# Patient Record
Sex: FEMALE | Race: BLACK OR AFRICAN AMERICAN | Employment: PART TIME | URBAN - METROPOLITAN AREA
[De-identification: names, ages, dates, MRNs, and addresses within clinical notes are randomized per-mention and may not be internally consistent; named-entity substitution may affect disease eponyms.]

---

## 2020-02-25 ENCOUNTER — TELEPHONE (OUTPATIENT)
Dept: GASTROENTEROLOGY | Age: 65
End: 2020-02-25

## 2020-03-19 ENCOUNTER — TELEPHONE (OUTPATIENT)
Dept: GASTROENTEROLOGY | Age: 65
End: 2020-03-19

## 2020-03-19 NOTE — TELEPHONE ENCOUNTER
Patient called left message on voicemail that she needs to make an appointment  3/19/2020 @ 12:20 .  Returned call and scheduled appointment

## 2020-05-19 ENCOUNTER — TELEPHONE (OUTPATIENT)
Dept: GASTROENTEROLOGY | Age: 65
End: 2020-05-19

## 2020-05-22 ENCOUNTER — TELEPHONE (OUTPATIENT)
Dept: GASTROENTEROLOGY | Age: 65
End: 2020-05-22

## 2020-06-15 ENCOUNTER — APPOINTMENT (OUTPATIENT)
Dept: CT IMAGING | Age: 65
End: 2020-06-15
Payer: MEDICARE

## 2020-06-15 ENCOUNTER — HOSPITAL ENCOUNTER (EMERGENCY)
Age: 65
Discharge: HOME | End: 2020-06-15
Attending: EMERGENCY MEDICINE
Payer: MEDICARE

## 2020-06-15 VITALS
OXYGEN SATURATION: 96 % | SYSTOLIC BLOOD PRESSURE: 136 MMHG | HEART RATE: 94 BPM | RESPIRATION RATE: 20 BRPM | DIASTOLIC BLOOD PRESSURE: 73 MMHG | TEMPERATURE: 98.7 F

## 2020-06-15 PROCEDURE — 72131 CT LUMBAR SPINE W/O DYE: CPT

## 2020-06-15 PROCEDURE — 99283 EMERGENCY DEPT VISIT LOW MDM: CPT

## 2020-06-15 PROCEDURE — 6360000002 HC RX W HCPCS: Performed by: STUDENT IN AN ORGANIZED HEALTH CARE EDUCATION/TRAINING PROGRAM

## 2020-06-15 PROCEDURE — 6370000000 HC RX 637 (ALT 250 FOR IP): Performed by: STUDENT IN AN ORGANIZED HEALTH CARE EDUCATION/TRAINING PROGRAM

## 2020-06-15 PROCEDURE — APPSS30 APP SPLIT SHARED TIME 16-30 MINUTES: Performed by: PHYSICIAN ASSISTANT

## 2020-06-15 PROCEDURE — 96372 THER/PROPH/DIAG INJ SC/IM: CPT

## 2020-06-15 RX ORDER — ACETAMINOPHEN 325 MG/1
650 TABLET ORAL ONCE
Status: COMPLETED | OUTPATIENT
Start: 2020-06-15 | End: 2020-06-15

## 2020-06-15 RX ORDER — ORPHENADRINE CITRATE 30 MG/ML
60 INJECTION INTRAMUSCULAR; INTRAVENOUS ONCE
Status: COMPLETED | OUTPATIENT
Start: 2020-06-15 | End: 2020-06-15

## 2020-06-15 RX ORDER — CYCLOBENZAPRINE HCL 5 MG
5 TABLET ORAL 2 TIMES DAILY PRN
Qty: 10 TABLET | Refills: 0 | Status: SHIPPED | OUTPATIENT
Start: 2020-06-15 | End: 2020-06-25

## 2020-06-15 RX ORDER — ACETAMINOPHEN 325 MG/1
650 TABLET ORAL EVERY 6 HOURS PRN
Qty: 30 TABLET | Refills: 0 | Status: SHIPPED | OUTPATIENT
Start: 2020-06-15 | End: 2021-04-10 | Stop reason: SDUPTHER

## 2020-06-15 RX ORDER — KETOROLAC TROMETHAMINE 30 MG/ML
30 INJECTION, SOLUTION INTRAMUSCULAR; INTRAVENOUS ONCE
Status: COMPLETED | OUTPATIENT
Start: 2020-06-15 | End: 2020-06-15

## 2020-06-15 RX ADMIN — KETOROLAC TROMETHAMINE 30 MG: 30 INJECTION, SOLUTION INTRAMUSCULAR at 15:08

## 2020-06-15 RX ADMIN — ORPHENADRINE CITRATE 60 MG: 30 INJECTION INTRAMUSCULAR; INTRAVENOUS at 15:07

## 2020-06-15 RX ADMIN — ACETAMINOPHEN 650 MG: 325 TABLET ORAL at 15:08

## 2020-06-15 ASSESSMENT — PAIN DESCRIPTION - PAIN TYPE: TYPE: ACUTE PAIN

## 2020-06-15 ASSESSMENT — PAIN SCALES - GENERAL: PAINLEVEL_OUTOF10: 10

## 2020-06-15 ASSESSMENT — PAIN DESCRIPTION - LOCATION: LOCATION: BACK

## 2020-06-15 ASSESSMENT — PAIN DESCRIPTION - ORIENTATION: ORIENTATION: LOWER

## 2020-06-15 NOTE — CONSULTS
confused - 4 []       Syllables, expletives - 3 []       Grunts - 2 []       None - 1 []    MOTOR RESPONSE     Spontaneous, command - 6 [x]       Localizes pain - 5 []       Withdraws pain - 4 []       Abnormal flexion - 3 []       Abnormal extension - 2 []       None - 1 []            Total GCS: 15    Mental Status:  Alert and oriented               Cranial Nerves:    cranial nerves II-XII are grossly intact    Motor Exam:    Drift:  absent  Tone:  normal    Motor exam is symmetrical 5 out of 5 all extremities bilaterally with the exception of 4/5 adriane on plantarflexion    Sensory:    Right Upper Extremity:  normal  Left Upper Extremity:  normal  Right Lower Extremity:  normal  Left Lower Extremity:  normal         LABS AND IMAGING:     CBC with Differential:  No results found for: WBC, RBC, HGB, HCT, PLT, MCV, MCH, MCHC, RDW, NRBC, SEGSPCT, BANDSPCT, BLASTSPCT, METASPCT, LYMPHOPCT, PROMYELOPCT, MONOPCT, MYELOPCT, EOSPCT, BASOPCT, MONOSABS, LYMPHSABS, EOSABS, BASOSABS, DIFFTYPE  BMP:  No results found for: NA, K, CL, CO2, BUN, LABALBU, CREATININE, CALCIUM, GFRAA, LABGLOM, GLUCOSE    Radiology Review:    Ct Lumbar Spine Wo Contrast    Result Date: 6/15/2020  EXAMINATION: CT OF THE LUMBAR SPINE WITHOUT CONTRAST  6/15/2020 TECHNIQUE: CT of the lumbar spine was performed without the administration of intravenous contrast. Multiplanar reformatted images are provided for review. Dose modulation, iterative reconstruction, and/or weight based adjustment of the mA/kV was utilized to reduce the radiation dose to as low as reasonably achievable. COMPARISON: None HISTORY: ORDERING SYSTEM PROVIDED HISTORY: fall, lumbar pain TECHNOLOGIST PROVIDED HISTORY: fall, lumbar pain FINDINGS: BONES/ALIGNMENT: Sagittal alignment of the lumbar spine is normal.  Lumbar vertebral bodies are normal in height. No acute lumbar spine fracture DEGENERATIVE CHANGES: Mild multilevel degenerative disc disease throughout the lumbar spine. Mild-to-moderate L2-L3 spinal canal stenosis due to diffuse disc bulge and ligamentum flavum thickening. Moderate to severe L3-L4 and L4-L5 spinal canal stenosis due to diffuse disc bulge and ligamentum thickening. Severe left L4-L5 neural foraminal stenosis. SOFT TISSUES/RETROPERITONEUM: No paraspinal mass is seen. 1. No acute osseous abnormality in the lumbar spine. 2. Moderate to severe L3-L4 and L4-L5 spinal canal stenosis. 3. Severe left L4-L5 neural foraminal stenosis. ASSESSMENT AND PLAN:     There is no problem list on file for this patient. A/P:  This is a 59 y.o. female with lumbar stenosis L3-4 and L4-5    Patient discussed with attending, evaluated patient along with attending.      - No neurosurgical interventions planned for now  - Ok to gain pain control and follow up outpt  - Follow up with Dr. Torrie Miller in 1-2 weeks       Please contact neurosurgery with any changes in patients neurologic status. Thank you for your consult.        Bryant Veloz pager [phone]  6/15/2020  5:39 PM

## 2020-06-17 ASSESSMENT — ENCOUNTER SYMPTOMS
VOMITING: 0
NAUSEA: 0
SHORTNESS OF BREATH: 0
ABDOMINAL PAIN: 0
BACK PAIN: 1

## 2020-06-17 NOTE — ED PROVIDER NOTES
motion and neck supple. Cardiovascular:      Rate and Rhythm: Normal rate. Pulses: Normal pulses. Pulmonary:      Effort: Pulmonary effort is normal. No respiratory distress (speaking in full sentences). Abdominal:      General: Bowel sounds are normal.      Palpations: Abdomen is soft. Musculoskeletal: Normal range of motion. General: No deformity (on exposed surfaces). Comments: Lumbar TTP at midline, L3-L4 region. Decreased ROM d/t pain. Abnormal gait. Skin:     General: Skin is warm and dry. Findings: No erythema or rash. Neurological:      Mental Status: She is alert and oriented to person, place, and time. Deep Tendon Reflexes: Reflexes are normal and symmetric. Comments: Normal sensation in all extremities. Psychiatric:         Behavior: Behavior normal.         DIFFERENTIAL  DIAGNOSIS     PLAN (LABS / IMAGING / EKG):  Orders Placed This Encounter   Procedures    CT LUMBAR SPINE WO CONTRAST    Inpatient consult to Neurosurgery       MEDICATIONS ORDERED:  Orders Placed This Encounter   Medications    orphenadrine (NORFLEX) injection 60 mg    ketorolac (TORADOL) injection 30 mg    acetaminophen (TYLENOL) tablet 650 mg    cyclobenzaprine (FLEXERIL) 5 MG tablet     Sig: Take 1 tablet by mouth 2 times daily as needed for Muscle spasms     Dispense:  10 tablet     Refill:  0    acetaminophen (TYLENOL) 325 MG tablet     Sig: Take 2 tablets by mouth every 6 hours as needed for Pain     Dispense:  30 tablet     Refill:  0       IMPRESSION:     ED Course as of Jun 17 1254   Mon Paul 15, 2020   [address] [AD]   8889    [AD]      ED Course User Index  [AD] Orquidea Zuniga MD       DIAGNOSTIC RESULTS / EMERGENCY DEPARTMENT COURSE / MDM     LABS:  No results found for this visit on 06/15/20. RADIOLOGY:  CT LUMBAR SPINE WO CONTRAST   Final Result   1. No acute osseous abnormality in the lumbar spine.    2. Moderate to severe L3-L4 and L4-L5 spinal canal stenosis. 3. Severe left L4-L5 neural foraminal stenosis. EKG  None    All EKG's are interpreted by the Emergency Department Physician who either signs or Co-signs this chart in the absence of a cardiologist.    EMERGENCY DEPARTMENT COURSE:  Pt cleared for discharged. Arranged f/u with Neurosurgery outpt with Dr. Dickson Dumont. Pain improved in ED. Provided w flexeril & Tylenol. No urinary incontinence, used restroom while in ED. No loss of sensation. Improved ambulation after meds. PROCEDURES:  None    CONSULTS:  IP CONSULT TO NEUROSURGERY    CRITICAL CARE:  None    FINAL IMPRESSION      1.  Spinal stenosis of lumbar region, unspecified whether neurogenic claudication present          DISPOSITION / PLAN     DISPOSITION Decision To Discharge 06/15/2020 05:43:23 PM      PATIENT REFERRED TO:  Grayson Pedro MD  [address]  [phone]    Schedule an appointment as soon as possible for a visit in 1 day  [place] [phone]    Schedule an appointment as soon as possible for a visit in 1 day  For Follow-up      DISCHARGE MEDICATIONS:  Discharge Medication List as of 6/15/2020  5:43 PM      START taking these medications    Details   cyclobenzaprine (FLEXERIL) 5 MG tablet Take 1 tablet by mouth 2 times daily as needed for Muscle spasms, Disp-10 tablet, R-0Print      acetaminophen (TYLENOL) 325 MG tablet Take 2 tablets by mouth every 6 hours as needed for Pain, Disp-30 tablet, R-0Print             Katia Miranda MD  Emergency Medicine Resident    (Please note that portions of thisnote were completed with a voice recognition program.  Efforts were made to edit the dictations but occasionally words are mis-transcribed.)       Katia Miranda MD  06/17/20 9798

## 2020-06-25 ENCOUNTER — HOSPITAL ENCOUNTER (EMERGENCY)
Age: 65
Discharge: HOME | End: 2020-06-25
Attending: EMERGENCY MEDICINE
Payer: COMMERCIAL

## 2020-06-25 VITALS
OXYGEN SATURATION: 98 % | HEART RATE: 66 BPM | TEMPERATURE: 98 F | SYSTOLIC BLOOD PRESSURE: 125 MMHG | DIASTOLIC BLOOD PRESSURE: 81 MMHG | RESPIRATION RATE: 16 BRPM

## 2020-06-25 PROCEDURE — 6360000002 HC RX W HCPCS: Performed by: STUDENT IN AN ORGANIZED HEALTH CARE EDUCATION/TRAINING PROGRAM

## 2020-06-25 PROCEDURE — 99283 EMERGENCY DEPT VISIT LOW MDM: CPT

## 2020-06-25 PROCEDURE — 96372 THER/PROPH/DIAG INJ SC/IM: CPT

## 2020-06-25 RX ORDER — DEXAMETHASONE SODIUM PHOSPHATE 4 MG/ML
4 INJECTION, SOLUTION INTRA-ARTICULAR; INTRALESIONAL; INTRAMUSCULAR; INTRAVENOUS; SOFT TISSUE ONCE
Status: COMPLETED | OUTPATIENT
Start: 2020-06-25 | End: 2020-06-25

## 2020-06-25 RX ORDER — METHYLPREDNISOLONE 4 MG/1
TABLET ORAL
Qty: 1 KIT | Refills: 0 | Status: SHIPPED | OUTPATIENT
Start: 2020-06-26 | End: 2020-07-02

## 2020-06-25 RX ORDER — KETOROLAC TROMETHAMINE 30 MG/ML
30 INJECTION, SOLUTION INTRAMUSCULAR; INTRAVENOUS ONCE
Status: COMPLETED | OUTPATIENT
Start: 2020-06-25 | End: 2020-06-25

## 2020-06-25 RX ADMIN — KETOROLAC TROMETHAMINE 30 MG: 30 INJECTION, SOLUTION INTRAMUSCULAR at 10:09

## 2020-06-25 RX ADMIN — DEXAMETHASONE SODIUM PHOSPHATE 4 MG: 4 INJECTION, SOLUTION INTRA-ARTICULAR; INTRALESIONAL; INTRAMUSCULAR; INTRAVENOUS; SOFT TISSUE at 10:09

## 2020-06-25 ASSESSMENT — ENCOUNTER SYMPTOMS
ABDOMINAL PAIN: 0
FACIAL SWELLING: 0
BACK PAIN: 1
SHORTNESS OF BREATH: 0
COUGH: 0

## 2020-06-25 ASSESSMENT — PAIN SCALES - GENERAL
PAINLEVEL_OUTOF10: 10
PAINLEVEL_OUTOF10: 10

## 2020-06-25 ASSESSMENT — PAIN DESCRIPTION - ORIENTATION: ORIENTATION: LOWER;MID

## 2020-06-25 ASSESSMENT — PAIN DESCRIPTION - PAIN TYPE: TYPE: ACUTE PAIN

## 2020-06-25 ASSESSMENT — PAIN DESCRIPTION - LOCATION: LOCATION: BACK

## 2020-06-25 NOTE — ED PROVIDER NOTES
[facility]     Emergency Department     Faculty Attestation        I performed a history and physical examination of the patient and discussed management with the resident. I reviewed the residents note and agree with the documented findings and plan of care. Any areas of disagreement are noted on the chart. I was personally present for the key portions of any procedures. I have documented in the chart those procedures where I was not present during the key portions. I have reviewed the emergency nurses triage note. I agree with the chief complaint, past medical history, past surgical history, allergies, medications, social and family history as documented unless otherwise noted below. For mid-level providers such as nurse practitioners as well as physicians assistants:    I have personally seen and evaluated the patient. I find the patient's history and physical exam are consistent with NP/PA documentation. I agree with the care provided, treatment rendered, disposition, & follow-up plan. Additional findings are as noted. Vital Signs: /81   Pulse 66   Temp 98 °F (36.7 °C) (Oral)   Resp 16   SpO2 98%   PCP:  Marvin Fisher MD    Pertinent Comments:     Presents with chronic back pain she seen her recently after a fall and had CT which showed foraminal stenosis. She has an MRI and appointment with nurse surgery next couple weeks. She denies any bowel or bladder incontinence. There is no weakness in her extremities she is able walk and ambulate. On exam she has midline lumbar tenderness but there is no step-offs or deformities she is able walk and ambulate with no assistance. There is no focal deficits she denies any fevers or chills. Abdomen is soft and nontender with no pulsatile abdominal mass.   My clinical suspicion for life-threatening internal spinal process such as tumor, mass, epidural is very unlikely given her normal neurological exam and the chronic nature of these findings. I feel she can be discharged and follow-up with neurosurgery as an outpatient. Will provide symptomatic treatment here.       Critical Care  None          Nash Murillo MD  Attending Emergency Medicine Physician              Miranda Blackwood MD  06/25/20 0520

## 2020-06-25 NOTE — PROGRESS NOTES
I was assigned to this patient in error. I did not see this patient or participate in their care.     Rahat Chan DO  6/25/2020 9:46 AM

## 2020-06-25 NOTE — ED PROVIDER NOTES
Not on file     Minutes per session: Not on file    Stress: Not on file   Relationships    Social connections     Talks on phone: Not on file     Gets together: Not on file     Attends Jain service: Not on file     Active member of club or organization: Not on file     Attends meetings of clubs or organizations: Not on file     Relationship status: Not on file    Intimate partner violence     Fear of current or ex partner: Not on file     Emotionally abused: Not on file     Physically abused: Not on file     Forced sexual activity: Not on file   Other Topics Concern    Not on file   Social History Narrative    Not on file       History reviewed. No pertinent family history. Allergies:  Patient has no known allergies. Home Medications:  Prior to Admission medications    Medication Sig Start Date End Date Taking? Authorizing Provider   methylPREDNISolone (MEDROL, JOSE ANTONIO,) 4 MG tablet Take by mouth. 6/26/20 7/2/20 Yes Lopez Bermudez DO   cyclobenzaprine (FLEXERIL) 5 MG tablet Take 1 tablet by mouth 2 times daily as needed for Muscle spasms 6/15/20 6/25/20  Tejas Kohler MD   acetaminophen (TYLENOL) 325 MG tablet Take 2 tablets by mouth every 6 hours as needed for Pain 6/15/20   Tejas Kohler MD       REVIEW OF SYSTEMS    (2-9 systems for level 4, 10 or more for level 5)      Review of Systems   Constitutional: Negative for fatigue and fever. HENT: Negative for facial swelling. Eyes: Negative for visual disturbance. Respiratory: Negative for cough and shortness of breath. Cardiovascular: Negative for chest pain and leg swelling. Gastrointestinal: Negative for abdominal pain. Genitourinary: Negative for dysuria. Musculoskeletal: Positive for back pain. Negative for neck pain and neck stiffness. Skin: Negative for rash. Neurological: Negative for weakness and headaches. Psychiatric/Behavioral: Negative for confusion.        PHYSICAL EXAM   (up to 7 for level 4, 8 or more for level 5)      INITIAL VITALS:   /81   Pulse 66   Temp 98 °F (36.7 °C) (Oral)   Resp 16   SpO2 98%     Physical Exam  Constitutional:       Appearance: She is not ill-appearing or diaphoretic. HENT:      Head: Normocephalic and atraumatic. Nose: No rhinorrhea. Eyes:      General:         Right eye: No discharge. Left eye: No discharge. Neck:      Musculoskeletal: No neck rigidity. Cardiovascular:      Rate and Rhythm: Normal rate. Pulmonary:      Effort: Pulmonary effort is normal. No respiratory distress. Abdominal:      General: There is no distension. Tenderness: There is no abdominal tenderness. There is no right CVA tenderness, left CVA tenderness or guarding. Musculoskeletal:         General: No swelling or deformity. Lumbar back: She exhibits tenderness, pain and spasm. She exhibits no edema and no deformity. Right lower leg: No edema. Left lower leg: No edema. Skin:     Capillary Refill: Capillary refill takes less than 2 seconds. Coloration: Skin is not jaundiced. Findings: No bruising, lesion or rash. Neurological:      Mental Status: She is alert. Cranial Nerves: No cranial nerve deficit. Sensory: No sensory deficit. Motor: No weakness. Coordination: Coordination normal.      Gait: Gait normal.         DIFFERENTIAL  DIAGNOSIS     PLAN (LABS / IMAGING / EKG):  No orders of the defined types were placed in this encounter. MEDICATIONS ORDERED:  Orders Placed This Encounter   Medications    ketorolac (TORADOL) injection 30 mg    dexamethasone (DECADRON) injection 4 mg    methylPREDNISolone (MEDROL, JOSE ANTONIO,) 4 MG tablet     Sig: Take by mouth.      Dispense:  1 kit     Refill:  0       DDX: Muscle spasm, back strain, acute on chronic back pain secondary to foraminal stenosis versus spinal stenosis, herniated disc, sciatica    DIAGNOSTIC RESULTS / EMERGENCY DEPARTMENT COURSE / MDM   LAB RESULTS:  No results found for this visit on 06/25/20. IMPRESSION: 79-year-old female with apparent back pain, vital signs stable and afebrile. Patient nontoxic-appearing. Complaints of lower back pain secondary to prior injury for which she was evaluated 2 weeks ago. Patient reports that she has follow-up scheduled with neurosurgery. Denies any new trauma or injury. Likely acute on chronic back pain secondary to foraminal stenosis. No evidence of cauda equina syndrome. EMERGENCY DEPARTMENT COURSE:  Patient evaluated at bedside, no acute distress, vital signs stable and afebrile. Pain with palpation of lumbar back. Given previous prescriptions for Flexeril and Tylenol at prior visit. Patient states that she has follow-up scheduled with neurosurgery on 7/5/2020 but the pain is getting worse and she has been unable to sleep at night. Plan for symptom control with IM Toradol and Decadron. Plan for discharge home with short course of steroids. Will instruct patient to keep her follow-up appointment with neurosurgery. Given strict ED return cautions follow directions. She verbalized understanding of and agreement with the discharge plan. PROCEDURES:  None    CONSULTS:  None    CRITICAL CARE:  Please see attending note    FINAL IMPRESSION      1. Acute exacerbation of chronic low back pain          DISPOSITION / PLAN     DISPOSITION Discharge - Pending Orders Complete 06/25/2020 10:08:57 AM      PATIENT REFERRED TO:  Melanie Zuniga MD  [address] [place]  [phone]    Schedule an appointment as soon as possible for a visit   For re-evaluation      DISCHARGE MEDICATIONS:  New Prescriptions    METHYLPREDNISOLONE (MEDROL, JOSE ATNONIO,) 4 MG TABLET    Take by mouth.        Samuel Mccallum DO  Emergency Medicine Resident    (Please note that portions of thisnote were completed with a voice recognition program.  Efforts were made to edit the dictations but occasionally words are mis-transcribed.)        Enzo Arias

## 2020-06-25 NOTE — ED NOTES
Pt arrived to ED alert and oriented x4. Pt c/o mid, lower back pain s/p fall 1 week ago. Pt reports being seen after fall and has MRI scheduled for July 5th. Pt reports taking her pain meds with no relief and reports that they cause her nausea. Pt denies new injury or fall. Pt denies having been around anyone suspected to have COVID-19 or anyone that has been sick, denies recent travel outside the [facility] or [address]. RR even and unlabored. NAD noted. Will continue monitor.      Keli Calle, RN  06/25/20 1011

## 2020-07-05 ENCOUNTER — HOSPITAL ENCOUNTER (OUTPATIENT)
Dept: MRI IMAGING | Age: 65
Discharge: HOME | End: 2020-07-07
Payer: MEDICARE

## 2020-07-05 PROCEDURE — 72148 MRI LUMBAR SPINE W/O DYE: CPT

## 2020-07-15 ENCOUNTER — HOSPITAL ENCOUNTER (EMERGENCY)
Age: 65
Discharge: HOME | End: 2020-07-15
Attending: EMERGENCY MEDICINE
Payer: COMMERCIAL

## 2020-07-15 VITALS
OXYGEN SATURATION: 97 % | HEART RATE: 76 BPM | WEIGHT: 143 LBS | BODY MASS INDEX: 22.98 KG/M2 | HEIGHT: 66 IN | RESPIRATION RATE: 20 BRPM | DIASTOLIC BLOOD PRESSURE: 68 MMHG | SYSTOLIC BLOOD PRESSURE: 105 MMHG | TEMPERATURE: 98.3 F

## 2020-07-15 PROCEDURE — 6360000002 HC RX W HCPCS: Performed by: STUDENT IN AN ORGANIZED HEALTH CARE EDUCATION/TRAINING PROGRAM

## 2020-07-15 PROCEDURE — 96372 THER/PROPH/DIAG INJ SC/IM: CPT

## 2020-07-15 PROCEDURE — 99283 EMERGENCY DEPT VISIT LOW MDM: CPT

## 2020-07-15 RX ORDER — KETOROLAC TROMETHAMINE 30 MG/ML
30 INJECTION, SOLUTION INTRAMUSCULAR; INTRAVENOUS ONCE
Status: COMPLETED | OUTPATIENT
Start: 2020-07-15 | End: 2020-07-15

## 2020-07-15 RX ORDER — DEXAMETHASONE SODIUM PHOSPHATE 4 MG/ML
4 INJECTION, SOLUTION INTRA-ARTICULAR; INTRALESIONAL; INTRAMUSCULAR; INTRAVENOUS; SOFT TISSUE ONCE
Status: COMPLETED | OUTPATIENT
Start: 2020-07-15 | End: 2020-07-15

## 2020-07-15 RX ADMIN — KETOROLAC TROMETHAMINE 30 MG: 30 INJECTION, SOLUTION INTRAMUSCULAR at 15:43

## 2020-07-15 RX ADMIN — DEXAMETHASONE SODIUM PHOSPHATE 4 MG: 4 INJECTION, SOLUTION INTRAMUSCULAR; INTRAVENOUS at 15:43

## 2020-07-15 ASSESSMENT — PAIN SCALES - GENERAL: PAINLEVEL_OUTOF10: 10

## 2020-07-15 NOTE — ED PROVIDER NOTES
[facility]     Emergency Department     Faculty Note/ Attestation      Pt Name: Donnie Ochoa                                       MRN: [id number]  [place] 1955  Date of evaluation: 7/15/2020    Patients PCP:    Buck Dubin, MD      Attestation  I performed a history and physical examination of the patient and discussed management with the resident. I reviewed the residents note and agree with the documented findings and plan of care. Any areas of disagreement are noted on the chart. I was personally present for the key portions of any procedures. I have documented in the chart those procedures where I was not present during the key portions. I have reviewed the emergency nurses triage note. I agree with the chief complaint, past medical history, past surgical history, allergies, medications, social and family history as documented unless otherwise noted below. For Physician Assistant/ Nurse Practitioner cases/documentation I have personally evaluated this patient and have completed at least one if not all key elements of the E/M (history, physical exam, and MDM). Additional findings are as noted. Initial Screens:             Vitals:    Vitals:    07/15/20 1426 07/15/20 1455   BP:  105/68   Pulse:  76   Resp:  20   Temp: 98.3 °F (36.8 °C)    TempSrc: Oral    SpO2:  97%   Weight:  143 lb (64.9 kg)   Height:  5' 6\" (1.676 m)       CHIEF COMPLAINT       Chief Complaint   Patient presents with    Back Pain     Pt to the ED c/o persistent back pain x 1 month since a fall. Pt states that she has been seen for back pain s/p fall and has had CT and MRI. Pt reports that she was seen by neurologist yesterday when she was prescribed pain medication and referred for PT and pain management.   Pt reports that she has not yet had time to fill pain medications              DIAGNOSTIC RESULTS             RADIOLOGY:   No orders to display         LABS:  Labs Reviewed - No data to display      EMERGENCY DEPARTMENT COURSE:     -------------------------  BP: 105/68, Temp: 98.3 °F (36.8 °C), Pulse: 76, Resp: 20      Comments    Acute on chronic LBP  Recent fall 1 ago  MRI with spondylolisthesis    Requesting Toradol and decadron  Has narcotic Rx at the pharmacy    Patient is unstable on her feet, she moved from [place] and has a walker and many assistive aids there however does not have them here. She does have a neurosurgeon here that she saw yesterday and is plugged in for water therapy, PT, pain management however has not been able to get her prescriptions filled or start these therapies. We will perform a post void residual and get her walker prior to discharge.   If these are reassuring we will discharge her with her current plan, otherwise we will put her in [facility] for PT OT    (Please note that portions of this note were completed with a voice recognition program.  Efforts were made to edit the dictations but occasionally words are mis-transcribed.)      [place] MD  Attending Emergency Physician         Klaudia Morin MD  07/15/20 1640

## 2020-07-15 NOTE — ED NOTES
Tingling bilateral sides of legs and pain shooting from lumbar to right leg. Pt states she fell approx 1 month ago. Saw neurologist yesterday who ordered pain medications but states she did not get the message that it was called in and so she did not pick it up. Pt states she has been set up for physical therapy and pain management through the neurologist. Pt is alert and oriented. Brought to room via wheelchair, ambulated to bed slowly and bent over. Will continue to monitor.         Tiffany Rosado RN  07/15/20 2334

## 2020-07-15 NOTE — ED NOTES
Took pt to bathroom and performed post residual scan on pt bladder. 37ml remained. Pt provided a walker and ambulated.         Quynh Philip RN  07/15/20 3016

## 2020-07-15 NOTE — ED PROVIDER NOTES
[address]  Emergency Department Encounter  Emergency Medicine Resident       This patient was evaluated in the Emergency Department for symptoms described in the history of present illness. He/she was evaluated in the context of the global COVID-19 pandemic, which necessitated consideration that the patient might be at risk for infection with the SARS-CoV-2 virus that causes COVID-19. Institutional protocols and algorithms that pertain to the evaluation of patients at risk for COVID-19 are in a state of rapid change based on information released by regulatory bodies including the CDC and federal and state organizations. These policies and algorithms were followed during the patient's care in the ED. Pt Name: Zahira March  MRN: [id number]  [place] 1955  Date of evaluation: 7/15/20  PCP:  Ascencion Glaser MD    [address] [place] [address]       Chief Complaint   Patient presents with    Back Pain     Pt to the ED c/o persistent back pain x 1 month since a fall. Pt states that she has been seen for back pain s/p fall and has had CT and MRI. Pt reports that she was seen by neurologist yesterday when she was prescribed pain medication and referred for PT and pain management. Pt reports that she has not yet had time to fill pain medications        HISTORY OF PRESENT ILLNESS  (Location/Symptom, Timing/Onset, Context/Setting, Quality, Duration, Modifying Factors, Severity.)    Zahira March is a 59 y.o. female  who presents with back pain that is chronic. Patient states that she was evaluated by neurosurgery, Dr. Becky Rosenthal on 7/5 and had an MRI done that showed mild lumbar spondylosis. Patient states that she has a chronic back injury from several months prior from a fall, her pain is 10 on 10 severity and unchanged. She states that she was unable to get to the pharmacy to  her pain pills today. She is requesting something to help her so that she may get to her medications.   She does not want pain pills. No recent spinal anesthesia, spinal surgery, or IVDA. No trauma, history of cancer, AC use, prolonged steroid use, hx of osteoporosis, hx of AAA, unrelenting night or rest pain, unexplained weight loss, immunocompromised status, hx of bicuspid aortic valve, connective tissue disorder, phillips syndrome, recent cardiac surgery/cath    PAST MEDICAL / SURGICAL / SOCIAL / FAMILY HISTORY    has a past medical history of Emphysema (subcutaneous) (surgical) resulting from a procedure and GERD (gastroesophageal reflux disease). has no past surgical history on file.     Social History     Socioeconomic History    Marital status: Single     Spouse name: Not on file    Number of children: Not on file    Years of education: Not on file    Highest education level: Not on file   Occupational History    Not on file   Social Needs    Financial resource strain: Not on file    Food insecurity     Worry: Not on file     Inability: Not on file    Transportation needs     Medical: Not on file     Non-medical: Not on file   Tobacco Use    Smoking status: Not on file   Substance and Sexual Activity    Alcohol use: Not on file    Drug use: Not on file    Sexual activity: Not on file   Lifestyle    Physical activity     Days per week: Not on file     Minutes per session: Not on file    Stress: Not on file   Relationships    Social connections     Talks on phone: Not on file     Gets together: Not on file     Attends Sabianism service: Not on file     Active member of club or organization: Not on file     Attends meetings of clubs or organizations: Not on file     Relationship status: Not on file    Intimate partner violence     Fear of current or ex partner: Not on file     Emotionally abused: Not on file     Physically abused: Not on file     Forced sexual activity: Not on file   Other Topics Concern    Not on file   Social History Narrative    Not on file       No family history on file.    Allergies:    Decadron [dexamethasone]; Iv dye [iodides]; and Penicillins    Home Medications:  Prior to Admission medications    Medication Sig Start Date End Date Taking? Authorizing Provider   acetaminophen (TYLENOL) 325 MG tablet Take 2 tablets by mouth every 6 hours as needed for Pain 6/15/20   Patricia Cortez MD       REVIEW OF SYSTEMS    (2-9 systems for level 4, 10 or more for level 5)        Gen: No Fever, No chills  EYES: No blurry visiion, no double vision  HENT: No sore throat, No runny nose. No cough  CV: No CP , No palpitation  RESP: No SOB, No respiratory distress  GI: No N/V, No Abdm pain  : No dysuria  SKIN: No rash  MSK: + back pain, no joint pain  NEURO: No HA, no weakness  PSYCH: No SI/HI        PHYSICAL EXAM   (up to 7 for level 4, 8 or more for level 5)     INITIAL VITALS:  height is 5' 6\" (1.676 m) and weight is 143 lb (64.9 kg). Her oral temperature is 98.3 °F (36.8 °C). Her blood pressure is 105/68 and her pulse is 76. Her respiration is 20 and oxygen saturation is 97%.      Physical Exam  GENERAL: upon initial examination, patient is uncomfortable appearing, nontoxic, and not in acute respiratory distress  HENT: normocephalic , nose normal,   EYES: no occular discharge, no scleral icterus  NECK: no JVD, no tracheal deviation  CV: Normal S1 S2, no MRG  PULM / CHEST: CTA Bilaterally all fields, no WRR  ABDOMEN: SNTND, No peritoneal signs   / ANORECTAL: deferred  MSK: no gross deformity, no edema, no TTP  NEURO:   Cranial Nerves:              CNII: Visual acuity normal, Visual fields full to confrontation              CNIII, IV, VI: Pupils equal, round and reactive to light, full extraoccular movements without nystagmus              CN V: Facial sensation intact bilaterally to fine touch and pinprick, masseter 5/5              CN VII: Facial muscles symmetric and strong              CN VIII: Hears finger rub well bilaterally              CN IX: Deferred              CN X: Palate elevates symmetrically              CN XI: Full strength shoulder shrug bilaterally              CN XII: Tongue protrusion full and midline  Sensation: Sensation is intact to proprioception, two point discrimination, and light touch  Cerebellar: Heel to shin intact bilaterally  Gait: Patient's gait is normal not ataxic wide-based, no shuffling  Muscle Strength:  + 5 / 5 Strength to LUE flexion, Extension  + 5 / 5 Strength to RUE flexion , Extension  + 5 / 5 Strength to flexion & extension of Left Hip leg plantar and dorsi flexion  + 5 / 5 Strength to flexion & extension of Right Hip leg plantar and dorsi flexion  No weakness upon cervical flexion to indicate critical spinal stenosis  No overlything skin changes on back but there is focal tenderness in the lumbar paraspinal area. SKIN: no rash, no erythema, cap refill < 2 sec  PSYCH / BEHAVIORAL: mood/affect normal, behavior normal, no flight of ideas      DIFFERENTIAL  DIAGNOSIS   PLAN (LABS / IMAGING / EKG):  No orders of the defined types were placed in this encounter. MEDICATIONS ORDERED:  Orders Placed This Encounter   Medications    ketorolac (TORADOL) injection 30 mg    dexamethasone (DECADRON) injection 4 mg           DIAGNOSTIC RESULTS / EMERGENCYDEPARTMENT COURSE / MDM   LABS:  Labs Reviewed - No data to display    RADIOLOGY:  No results found. EMERGENCY DEPARTMENT COURSE / MDM / DDX:      MDM:  42-year-old female presenting with acute on chronic exacerbation of low back pain. Denies any red flag symptoms. Recently had MRI on 7/5 demonstrating mild lumbar spondylosis. Will provide Toradol and Decadron, patient does not request any narcotics or other medications she is just requesting with medication she had last time to help bridge her so that she may  her prescription from the pharmacy. Discharge    CONSULTS:  None    CRITICAL CARE:  Please see attending note    FINAL IMPRESSION     1.  Acute exacerbation of chronic low back pain DISPOSITION / PLAN   DISPOSITION Decision To Discharge 07/15/2020 03:35:34 PM       Evaluation and treatment course in the ED, and plan of care upon discharge was discussed in length with the patient. Patient had no further questions prior to being discharged and was instructed to return to the ED for new or worsening symptoms. Any changes to existing medications or new prescriptions were reviewed with patient and they expressed understanding of how to correctly take their medications and the possible side effects. The patient understands that at this time there is no evidence for a more malignant underlying process, but the patient also understands that early in the process of an illness or injury, an emergency department workup can be falsely reassuring. Routine discharge counseling was given, and the patient understands that worsening, changing or persistent symptoms should prompt an immediate call or follow up with his/her primary physician or return to the emergency department. The importance of appropriate follow up was also discussed. More extensive discharge instructions were given in the patients discharge paperwork. PATIENT REFERRED TO:  Brian Hernandez MD  [address] [facility] [address]  [phone]    Schedule an appointment as soon as possible for a visit in 2 days  Return to the ER if worsening or any other concern      DISCHARGE MEDICATIONS:  New Prescriptions    No medications on file       Dr. Reilly Neal.  [address] [facility]  Emergency Medicine Resident Physician, PGY-3    (Please note that portions of this note were completed with a voice recognition program.  Efforts were made to edit the dictations but occasionally words are mis-transcribed.)         Amanda Cavazos DO  Resident  07/15/20 5511

## 2020-09-08 ENCOUNTER — HOSPITAL ENCOUNTER (OUTPATIENT)
Age: 65
Setting detail: OBSERVATION
Discharge: HOME | End: 2020-09-10
Attending: EMERGENCY MEDICINE | Admitting: EMERGENCY MEDICINE
Payer: MEDICARE

## 2020-09-08 ENCOUNTER — APPOINTMENT (OUTPATIENT)
Dept: GENERAL RADIOLOGY | Age: 65
End: 2020-09-08
Payer: MEDICARE

## 2020-09-08 PROBLEM — R55 SYNCOPE AND COLLAPSE: Status: ACTIVE | Noted: 2020-09-08

## 2020-09-08 LAB
-: NORMAL
ABSOLUTE EOS #: 0.16 K/UL (ref 0–0.44)
ABSOLUTE IMMATURE GRANULOCYTE: 0.03 K/UL (ref 0–0.3)
ABSOLUTE LYMPH #: 2.72 K/UL (ref 1.1–3.7)
ABSOLUTE MONO #: 0.56 K/UL (ref 0.1–1.2)
ALBUMIN SERPL-MCNC: 4.1 G/DL (ref 3.5–5.2)
ALBUMIN/GLOBULIN RATIO: 1.5 (ref 1–2.5)
ALP BLD-CCNC: 51 U/L (ref 35–104)
ALT SERPL-CCNC: 22 U/L (ref 5–33)
AMORPHOUS: NORMAL
ANION GAP SERPL CALCULATED.3IONS-SCNC: 11 MMOL/L (ref 9–17)
AST SERPL-CCNC: 38 U/L
BACTERIA: NORMAL
BASOPHILS # BLD: 1 % (ref 0–2)
BASOPHILS ABSOLUTE: 0.05 K/UL (ref 0–0.2)
BILIRUB SERPL-MCNC: 0.38 MG/DL (ref 0.3–1.2)
BILIRUBIN URINE: NEGATIVE
BUN BLDV-MCNC: 19 MG/DL (ref 8–23)
BUN/CREAT BLD: ABNORMAL (ref 9–20)
CALCIUM SERPL-MCNC: 9.3 MG/DL (ref 8.6–10.4)
CASTS UA: NORMAL /LPF (ref 0–8)
CHLORIDE BLD-SCNC: 105 MMOL/L (ref 98–107)
CO2: 25 MMOL/L (ref 20–31)
COLOR: YELLOW
COMMENT UA: ABNORMAL
CREAT SERPL-MCNC: 0.81 MG/DL (ref 0.5–0.9)
CRYSTALS, UA: NORMAL /HPF
DIFFERENTIAL TYPE: ABNORMAL
EKG ATRIAL RATE: 78 BPM
EKG P AXIS: 70 DEGREES
EKG P-R INTERVAL: 158 MS
EKG Q-T INTERVAL: 388 MS
EKG QRS DURATION: 88 MS
EKG QTC CALCULATION (BAZETT): 442 MS
EKG R AXIS: 32 DEGREES
EKG T AXIS: 46 DEGREES
EKG VENTRICULAR RATE: 78 BPM
EOSINOPHILS RELATIVE PERCENT: 3 % (ref 1–4)
EPITHELIAL CELLS UA: NORMAL /HPF (ref 0–5)
GFR AFRICAN AMERICAN: >60 ML/MIN
GFR NON-AFRICAN AMERICAN: >60 ML/MIN
GFR SERPL CREATININE-BSD FRML MDRD: ABNORMAL ML/MIN/{1.73_M2}
GFR SERPL CREATININE-BSD FRML MDRD: ABNORMAL ML/MIN/{1.73_M2}
GLUCOSE BLD-MCNC: 115 MG/DL (ref 70–99)
GLUCOSE URINE: NEGATIVE
HCT VFR BLD CALC: 41 % (ref 36.3–47.1)
HEMOGLOBIN: 13.1 G/DL (ref 11.9–15.1)
IMMATURE GRANULOCYTES: 1 %
KETONES, URINE: NEGATIVE
LEUKOCYTE ESTERASE, URINE: ABNORMAL
LIPASE: 24 U/L (ref 13–60)
LYMPHOCYTES # BLD: 48 % (ref 24–43)
MCH RBC QN AUTO: 29.1 PG (ref 25.2–33.5)
MCHC RBC AUTO-ENTMCNC: 32 G/DL (ref 28.4–34.8)
MCV RBC AUTO: 91.1 FL (ref 82.6–102.9)
MONOCYTES # BLD: 10 % (ref 3–12)
MUCUS: NORMAL
NITRITE, URINE: NEGATIVE
NRBC AUTOMATED: 0 PER 100 WBC
OTHER OBSERVATIONS UA: NORMAL
PDW BLD-RTO: 13.2 % (ref 11.8–14.4)
PH UA: 7 (ref 5–8)
PLATELET # BLD: 230 K/UL (ref 138–453)
PLATELET ESTIMATE: ABNORMAL
PMV BLD AUTO: 10.2 FL (ref 8.1–13.5)
POTASSIUM SERPL-SCNC: 4.4 MMOL/L (ref 3.7–5.3)
PROTEIN UA: NEGATIVE
RBC # BLD: 4.5 M/UL (ref 3.95–5.11)
RBC # BLD: ABNORMAL 10*6/UL
RBC UA: NORMAL /HPF (ref 0–4)
RENAL EPITHELIAL, UA: NORMAL /HPF
SEG NEUTROPHILS: 37 % (ref 36–65)
SEGMENTED NEUTROPHILS ABSOLUTE COUNT: 2.08 K/UL (ref 1.5–8.1)
SODIUM BLD-SCNC: 141 MMOL/L (ref 135–144)
SPECIFIC GRAVITY UA: 1.02 (ref 1–1.03)
TOTAL PROTEIN: 6.9 G/DL (ref 6.4–8.3)
TRICHOMONAS: NORMAL
TROPONIN INTERP: NORMAL
TROPONIN INTERP: NORMAL
TROPONIN T: NORMAL NG/ML
TROPONIN T: NORMAL NG/ML
TROPONIN, HIGH SENSITIVITY: 6 NG/L (ref 0–14)
TROPONIN, HIGH SENSITIVITY: 6 NG/L (ref 0–14)
TURBIDITY: CLEAR
URINE HGB: NEGATIVE
UROBILINOGEN, URINE: NORMAL
WBC # BLD: 5.6 K/UL (ref 3.5–11.3)
WBC # BLD: ABNORMAL 10*3/UL
WBC UA: NORMAL /HPF (ref 0–5)
YEAST: NORMAL

## 2020-09-08 PROCEDURE — 93010 ELECTROCARDIOGRAM REPORT: CPT | Performed by: INTERNAL MEDICINE

## 2020-09-08 PROCEDURE — 71046 X-RAY EXAM CHEST 2 VIEWS: CPT

## 2020-09-08 PROCEDURE — 96372 THER/PROPH/DIAG INJ SC/IM: CPT

## 2020-09-08 PROCEDURE — 81001 URINALYSIS AUTO W/SCOPE: CPT

## 2020-09-08 PROCEDURE — 6360000002 HC RX W HCPCS: Performed by: STUDENT IN AN ORGANIZED HEALTH CARE EDUCATION/TRAINING PROGRAM

## 2020-09-08 PROCEDURE — 83690 ASSAY OF LIPASE: CPT

## 2020-09-08 PROCEDURE — 80053 COMPREHEN METABOLIC PANEL: CPT

## 2020-09-08 PROCEDURE — 93005 ELECTROCARDIOGRAM TRACING: CPT | Performed by: STUDENT IN AN ORGANIZED HEALTH CARE EDUCATION/TRAINING PROGRAM

## 2020-09-08 PROCEDURE — 6370000000 HC RX 637 (ALT 250 FOR IP): Performed by: STUDENT IN AN ORGANIZED HEALTH CARE EDUCATION/TRAINING PROGRAM

## 2020-09-08 PROCEDURE — 84484 ASSAY OF TROPONIN QUANT: CPT

## 2020-09-08 PROCEDURE — G0378 HOSPITAL OBSERVATION PER HR: HCPCS

## 2020-09-08 PROCEDURE — 85025 COMPLETE CBC W/AUTO DIFF WBC: CPT

## 2020-09-08 PROCEDURE — 99285 EMERGENCY DEPT VISIT HI MDM: CPT

## 2020-09-08 RX ORDER — ACETAMINOPHEN 325 MG/1
650 TABLET ORAL EVERY 4 HOURS PRN
Status: DISCONTINUED | OUTPATIENT
Start: 2020-09-08 | End: 2020-09-10 | Stop reason: HOSPADM

## 2020-09-08 RX ORDER — FENTANYL CITRATE 50 UG/ML
25 INJECTION, SOLUTION INTRAMUSCULAR; INTRAVENOUS
Status: DISCONTINUED | OUTPATIENT
Start: 2020-09-08 | End: 2020-09-10 | Stop reason: HOSPADM

## 2020-09-08 RX ORDER — OXYCODONE HYDROCHLORIDE 5 MG/1
10 TABLET ORAL EVERY 4 HOURS PRN
Status: DISCONTINUED | OUTPATIENT
Start: 2020-09-08 | End: 2020-09-10 | Stop reason: HOSPADM

## 2020-09-08 RX ORDER — ACETAMINOPHEN 325 MG/1
650 TABLET ORAL EVERY 6 HOURS PRN
Status: DISCONTINUED | OUTPATIENT
Start: 2020-09-08 | End: 2020-09-08 | Stop reason: SDUPTHER

## 2020-09-08 RX ORDER — FENTANYL CITRATE 50 UG/ML
50 INJECTION, SOLUTION INTRAMUSCULAR; INTRAVENOUS
Status: DISCONTINUED | OUTPATIENT
Start: 2020-09-08 | End: 2020-09-10 | Stop reason: HOSPADM

## 2020-09-08 RX ORDER — ALBUTEROL SULFATE 90 UG/1
2 AEROSOL, METERED RESPIRATORY (INHALATION) 4 TIMES DAILY PRN
Qty: 3 INHALER | Refills: 1 | Status: SHIPPED | OUTPATIENT
Start: 2020-09-08 | End: 2020-09-10 | Stop reason: SDUPTHER

## 2020-09-08 RX ORDER — OXYCODONE HYDROCHLORIDE 5 MG/1
5 TABLET ORAL EVERY 4 HOURS PRN
Status: DISCONTINUED | OUTPATIENT
Start: 2020-09-08 | End: 2020-09-10 | Stop reason: HOSPADM

## 2020-09-08 RX ADMIN — LIDOCAINE HYDROCHLORIDE: 20 SOLUTION ORAL; TOPICAL at 14:14

## 2020-09-08 RX ADMIN — OXYCODONE HYDROCHLORIDE 10 MG: 5 TABLET ORAL at 22:08

## 2020-09-08 RX ADMIN — OXYCODONE HYDROCHLORIDE 10 MG: 5 TABLET ORAL at 16:20

## 2020-09-08 RX ADMIN — ENOXAPARIN SODIUM 40 MG: 40 INJECTION SUBCUTANEOUS at 16:16

## 2020-09-08 RX ADMIN — ACETAMINOPHEN 650 MG: 325 TABLET ORAL at 16:15

## 2020-09-08 ASSESSMENT — ENCOUNTER SYMPTOMS
ABDOMINAL PAIN: 1
COUGH: 0
WHEEZING: 0
RHINORRHEA: 0
CHOKING: 0
SHORTNESS OF BREATH: 1
SORE THROAT: 0

## 2020-09-08 ASSESSMENT — PAIN SCALES - GENERAL
PAINLEVEL_OUTOF10: 10
PAINLEVEL_OUTOF10: 8
PAINLEVEL_OUTOF10: 3
PAINLEVEL_OUTOF10: 8
PAINLEVEL_OUTOF10: 6
PAINLEVEL_OUTOF10: 8

## 2020-09-08 ASSESSMENT — PAIN DESCRIPTION - PROGRESSION: CLINICAL_PROGRESSION: OTHER (COMMENT)

## 2020-09-08 ASSESSMENT — PAIN DESCRIPTION - PAIN TYPE: TYPE: ACUTE PAIN

## 2020-09-08 NOTE — PROGRESS NOTES
[facility]  Documentation Note                Admission Dx: Syncope and collapse [R55]    Past Medical History:   has a past medical history of Emphysema (subcutaneous) (surgical) resulting from a procedure, GERD (gastroesophageal reflux disease), and H. pylori infection. Previous Testing:     STRESS 3/13/19: Normal perfusion without any reversible ischemia. EF 51%. ECHO 12/31/13: EF 53%, normal cardiac structure. Previous office/hospital visit:   Dr. Louie Oar 12/31/13:   Abnormal EKG  Lumbar disc disease with radiculopathy  Neuropathy  COPD    Plan:  1. Echo for evaluation of abnormal EKF  2. Tylenol #3 for pain  3. Cardiac status is stable for surgery. Echo shows a normal EF of 53% with normal wall motion    Reason for Consult: Cardiac evaluation for lumbar decompression surgery, Abnormal EKG  Vanessa Franz is a 62 y.o. female who complains of bilateral leg pain with numbness and tingling.  The patient was referred to me for preoperative cardiac evaluation     Tiffanie Gaming, [facility]

## 2020-09-08 NOTE — ED PROVIDER NOTES
[address]  ED  Emergency Department Encounter  EmergencyMedicine Resident     Pt Regino Govea  MRN: [id number]  [place] 1955  Date of evaluation: 9/8/20  PCP:  Carmen Ricketts MD    [address] [place] [address]       Chief Complaint   Patient presents with    Shortness of Breath     Off and on for several months. Reported had a chemical exposure    Abdominal Pain     Several days. Tenderness       HISTORY OF PRESENT ILLNESS  (Location/Symptom, Timing/Onset, Context/Setting, Quality, Duration, Modifying Factors, Severity.)      Bhargavi Cohen is a 59 y.o. female who presents with numerous complaints. Patient states she is he was at work last evening on the overnight shift when she felt generalized weakness patient states that she \"loses consciousness while speaking. She was recently evaluated as an outpatient by her primary care physician as she has been to the area with outpatient testing and laboratory evaluation prescriptions provided. Patient is present to the emergency department requesting these lab work to be done including a mammogram and lead levels. Is explained to her that these are nonemergent procedures that are done in the emergency department she states she is also having shortness of breath as well as abdominal pain and has a history of H. pylori infection. Patient describes being overly tired. She has not tried anything at home for this. She is concerned that her symptoms are related to a chemical exposure at work. PAST MEDICAL / SURGICAL / SOCIAL / FAMILY HISTORY      has a past medical history of Emphysema (subcutaneous) (surgical) resulting from a procedure and GERD (gastroesophageal reflux disease). has no past surgical history on file.       Social History     Socioeconomic History    Marital status: Single     Spouse name: Not on file    Number of children: Not on file    Years of education: Not on file    Highest education level: Not on file   Occupational History    Not on file   Social Needs    Financial resource strain: Not on file    Food insecurity     Worry: Not on file     Inability: Not on file    Transportation needs     Medical: Not on file     Non-medical: Not on file   Tobacco Use    Smoking status: Not on file   Substance and Sexual Activity    Alcohol use: Not on file    Drug use: Not on file    Sexual activity: Not on file   Lifestyle    Physical activity     Days per week: Not on file     Minutes per session: Not on file    Stress: Not on file   Relationships    Social connections     Talks on phone: Not on file     Gets together: Not on file     Attends Restorationist service: Not on file     Active member of club or organization: Not on file     Attends meetings of clubs or organizations: Not on file     Relationship status: Not on file    Intimate partner violence     Fear of current or ex partner: Not on file     Emotionally abused: Not on file     Physically abused: Not on file     Forced sexual activity: Not on file   Other Topics Concern    Not on file   Social History Narrative    Not on file       No family history on file. Allergies:  Decadron [dexamethasone]; Iv dye [iodides]; and Penicillins    Home Medications:  Prior to Admission medications    Medication Sig Start Date End Date Taking? Authorizing Provider   acetaminophen (TYLENOL) 325 MG tablet Take 2 tablets by mouth every 6 hours as needed for Pain 6/15/20   Leena Arora MD       REVIEW OF SYSTEMS    (2-9 systems for level 4, 10 or more for level 5)      Review of Systems   Constitutional: Positive for activity change, appetite change and fatigue. Negative for fever. HENT: Negative for congestion, rhinorrhea and sore throat. Eyes: Negative for visual disturbance. Respiratory: Positive for shortness of breath. Negative for cough, choking and wheezing. Cardiovascular: Negative for chest pain. Gastrointestinal: Positive for abdominal pain. Genitourinary: Negative for difficulty urinating. Musculoskeletal: Negative for neck pain and neck stiffness. Skin: Negative for wound. Neurological: Positive for weakness. Negative for dizziness, syncope, numbness and headaches. Psychiatric/Behavioral: Positive for behavioral problems. PHYSICAL EXAM   (up to 7 for level 4, 8 or more for level 5)      INITIAL VITALS:   /79   Pulse 83   Temp 98 °F (36.7 °C)   Resp 18   Ht 5' 6\" (1.676 m)   Wt 148 lb (67.1 kg)   SpO2 99%   BMI 23.89 kg/m²     Physical Exam  Vitals signs and nursing note reviewed. Constitutional:       General: She is not in acute distress. Appearance: She is well-developed and normal weight. She is not ill-appearing, toxic-appearing or diaphoretic. HENT:      Head: Normocephalic and atraumatic. Right Ear: External ear normal.      Left Ear: External ear normal.      Nose: Nose normal.      Mouth/Throat:      Mouth: Mucous membranes are moist.   Eyes:      General: No scleral icterus. Right eye: No discharge. Left eye: No discharge. Extraocular Movements: Extraocular movements intact. Conjunctiva/sclera: Conjunctivae normal.      Pupils: Pupils are equal, round, and reactive to light. Neck:      Musculoskeletal: Normal range of motion. Cardiovascular:      Rate and Rhythm: Normal rate. Pulses: Normal pulses. Pulmonary:      Effort: Pulmonary effort is normal. No respiratory distress. Breath sounds: Normal breath sounds. Abdominal:      General: Bowel sounds are normal. There is no distension. Palpations: Abdomen is soft. Tenderness: There is abdominal tenderness. There is no right CVA tenderness, left CVA tenderness, guarding or rebound. Musculoskeletal: Normal range of motion. General: No swelling, tenderness or deformity. Right lower leg: No edema. Left lower leg: No edema. Skin:     General: Skin is warm.       Coloration: Skin is not 33.5 pg    MCHC 32.0 28.4 - 34.8 g/dL    RDW 13.2 11.8 - 14.4 %    Platelets 811 058 - 635 k/uL    MPV 10.2 8.1 - 13.5 fL    NRBC Automated 0.0 0.0 per 100 WBC    Differential Type NOT REPORTED     Seg Neutrophils 37 36 - 65 %    Lymphocytes 48 (H) 24 - 43 %    Monocytes 10 3 - 12 %    Eosinophils % 3 1 - 4 %    Basophils 1 0 - 2 %    Immature Granulocytes 1 (H) 0 %    Segs Absolute 2.08 1.50 - 8.10 k/uL    Absolute Lymph # 2.72 1.10 - 3.70 k/uL    Absolute Mono # 0.56 0.10 - 1.20 k/uL    Absolute Eos # 0.16 0.00 - 0.44 k/uL    Basophils Absolute 0.05 0.00 - 0.20 k/uL    Absolute Immature Granulocyte 0.03 0.00 - 0.30 k/uL    WBC Morphology NOT REPORTED     RBC Morphology NOT REPORTED     Platelet Estimate NOT REPORTED    Comprehensive Metabolic Panel   Result Value Ref Range    Glucose 115 (H) 70 - 99 mg/dL    BUN 19 8 - 23 mg/dL    CREATININE 0.81 0.50 - 0.90 mg/dL    Bun/Cre Ratio NOT REPORTED 9 - 20    Calcium 9.3 8.6 - 10.4 mg/dL    Sodium 141 135 - 144 mmol/L    Potassium 4.4 3.7 - 5.3 mmol/L    Chloride 105 98 - 107 mmol/L    CO2 25 20 - 31 mmol/L    Anion Gap 11 9 - 17 mmol/L    Alkaline Phosphatase 51 35 - 104 U/L    ALT 22 5 - 33 U/L    AST 38 (H) <32 U/L    Total Bilirubin 0.38 0.3 - 1.2 mg/dL    Total Protein 6.9 6.4 - 8.3 g/dL    Alb 4.1 3.5 - 5.2 g/dL    Albumin/Globulin Ratio 1.5 1.0 - 2.5    GFR Non-African American >60 >60 mL/min    GFR African American >60 >60 mL/min    GFR Comment          GFR Staging NOT REPORTED    LIPASE   Result Value Ref Range    Lipase 24 13 - 60 U/L   Troponin   Result Value Ref Range    Troponin, High Sensitivity 6 0 - 14 ng/L    Troponin T NOT REPORTED <0.03 ng/mL    Troponin Interp NOT REPORTED        IMPRESSION: Alert and oriented 51-year-old female no acute distress speaking full sentences equal chest rise insert auscultation bilateral heart regular rate and rhythm abdomen soft without any peritoneal signs she has tenderness diffusely and hyperactive bowel sounds. Patient endorses a history of H. pylori infection however denies any abdominal pain prior to my evaluation of her abdomen. She is ambulatory to room 17 without difficulty assistance in no apparent distress. However upon arriving to the room she has numerous complaints. Per discussion with patient her primary complaint today is shortness of breath will obtain chest pain work-up given her symptoms consistent with presyncope. We will offer analgesia for her abdominal pain She does not have an acute abdomen. RADIOLOGY:  Xr Chest (2 Vw)    Result Date: 9/8/2020  EXAMINATION: TWO XRAY VIEWS OF THE CHEST 9/8/2020 2:04 pm COMPARISON: None. HISTORY: ORDERING SYSTEM PROVIDED HISTORY: sob TECHNOLOGIST PROVIDED HISTORY: sob Reason for Exam: ap, left lat uprt cxr  pt states it feels like needle is stabbing her mid sternal area, keeps passing out Acuity: Unknown Type of Exam: Unknown FINDINGS: The cardiomediastinal silhouette is at the upper limits of normal in size. There are increased lung markings at the bilateral infrahilar regions, likely related to mild bronchitis. There is no pleural effusion. There is no pneumothorax. There is no acute osseous abnormality. There are surgical clips overlying the right     Increased lung markings at the bilateral infrahilar regions, may be related to mild bronchitis. EKG  Normal sinus rhythm rate of 78 normal axis normal intervals  normal precordial T wave balance poor R wave progression. All EKG's are interpreted by the Emergency Department Physician who either signs or Co-signs this chart in the absence of a cardiologist.    EMERGENCY DEPARTMENT COURSE:  ED Course as of Sep 08 1533   Tue Sep 08, 2020   1440 Reevaluation patient is endorsing that she has episodes where she wakes up on the floor and does not know how she got there concern for syncope versus seizures.   Patient updated on results of her labs will admit to obs for syncope vs seizures consulted ordered [BG]      ED Course User Index  [BG] Liberty Mary DO         PROCEDURES:  none    CONSULTS:  None    CRITICAL CARE:  Please see attending note    FINAL IMPRESSION      1. Syncope and collapse          DISPOSITION / PLAN     DISPOSITION  Admitted to office with cardiac consult placed as well as neurology consult placed for syncope versus seizure.   Patient care transferred to Dr. Licha Sidhu at 10 PM.      PATIENT REFERRED TO:  Carmen Ricketts MD  [address] [facility] [address]  [phone]    Call today      [facility] ED  [address] [place] [address]  [phone]  Go to   As needed, If symptoms worsen      DISCHARGE MEDICATIONS:  New Prescriptions    ALBUTEROL SULFATE HFA (VENTOLIN HFA) 108 (90 BASE) MCG/ACT INHALER    Inhale 2 puffs into the lungs 4 times daily as needed for Wheezing       Liberty Mary DO  Emergency Medicine Resident    (Please note that portions of thisnote were completed with a voice recognition program.  Efforts were made to edit the dictations but occasionally words are mis-transcribed.)       Liberty Mary DO  Resident  09/08/20 6031

## 2020-09-08 NOTE — ED NOTES
Received report from [facility]. Pt is resting on stretcher, denies any further needs at this time.  Pt is waiting for a clean inpatient bed     Meg Bob RN  09/08/20 1920

## 2020-09-08 NOTE — ED NOTES
Patient admitted to obs status. Has multiple complaints throughout stay such as concern for lead exposure, will pass out and her work thinks she is sleeping, pain in multiple areas including back, chest, body, and abdomen, decreased appetite, fatigue, shortness of breath, and feels dehydrated. Says she is generally healthy and doesn't need to see a doctor unless she is very sick. Has seen a doctor once and does not feel like the doctor is her doctor. Says she is \"allergic to all antibiotics\" and any antibiotics given to her requires \"the doctors to monitor me. \" Cannot name what antibiotics she is allergic to. Reports a touch improvement with oxycodone. Reports she can barely walk 20 feet to bathroom and back.       Mel Mendes RN  09/08/20 [id number]

## 2020-09-08 NOTE — ED PROVIDER NOTES
FACULTY SIGN-OUT  ADDENDUM     Care of this patient was assumed from previous attending physician. The patient's initial evaluation and plan have been discussed with the prior provider who initially evaluated the patient. Attestation  I was available and discussed any additional care issues that arose and coordinated the management plans with the resident(s) caring for the patient during my duty period. Any areas of disagreement with resident's documentation of care or procedures are noted on the chart. I was personally present for the key portions of any/all procedures, during my duty period. I have documented in the chart those procedures where I was not present during the key portions. ED COURSE      The patient was given the following medications:  Orders Placed This Encounter   Medications    aluminum & magnesium hydroxide-simethicone (MAALOX) 30 mL, lidocaine viscous hcl (XYLOCAINE) 5 mL (GI COCKTAIL)    albuterol sulfate HFA (VENTOLIN HFA) 108 (90 Base) MCG/ACT inhaler     Sig: Inhale 2 puffs into the lungs 4 times daily as needed for Wheezing     Dispense:  3 Inhaler     Refill:  1       RECENT VITALS:   Temp: 98 °F (36.7 °C), Pulse: 83, Resp: 18, BP: 104/79    MEDICAL DECISION MAKING        Chanel Colon is a 59 y.o. female who presents to the Emergency Department with complaints of syncope vs seizure.  Admitted to Georgia Vaughn MD  Attending Emergency Physician    (Please note that portions of this note were completed with a voice recognition program.  Efforts were made to edit the dictations but occasionally words are mis-transcribed.)          Rakesh Urbina MD  09/08/20 [id number]

## 2020-09-08 NOTE — LETTER
[facility]  Med Surg  [id number] [address]  Phone: [phone]    No name on file. September 10, 2020     Patient: Sharmila Ruby   YOB: 1955   Date of Visit: 9/8/2020       To Whom It May Concern: It is my medical opinion that Monique Frazier be allowed to keep snacks and water with her at all times while she is at work. If you have any questions or concerns, please don't hesitate to call the above-listed number.     Sincerely,        Thiago Ceja MD, MPH  Department of Emergency Medicine  [address] [facility]

## 2020-09-08 NOTE — ED PROVIDER NOTES
[facility]     Emergency Department     Faculty Attestation    I performed a history and physical examination of the patient and discussed management with the resident. I reviewed the residents note and agree with the documented findings and plan of care. Any areas of disagreement are noted on the chart. I was personally present for the key portions of any procedures. I have documented in the chart those procedures where I was not present during the key portions. I have reviewed the emergency nurses triage note. I agree with the chief complaint, past medical history, past surgical history, allergies, medications, social and family history as documented unless otherwise noted below. For Physician Assistant/ Nurse Practitioner cases/documentation I have personally evaluated this patient and have completed at least one if not all key elements of the E/M (history, physical exam, and MDM). Additional findings are as noted. I have personally seen and evaluated the patient. I find the patient's history and physical exam are consistent with the NP/PA documentation. I agree with the care provided, treatment rendered, disposition and follow-up plan. 22-year-old female with history of hypertension, syncope, emphysema, spinal stenosis, presenting with fatigue and syncope. Patient states that she has been having episodes while working where she blacks out and does not remember chunks of time. She states that her work believes that she is sleeping on the job, but she does not remember falling asleep. She has been feeling more weak recently. She denies any urinary incontinence or tongue biting. She does have these episodes on her days off as well. Patient does work night shifts, but states that she does sleep well during the day.     Exam:  General: Laying on the bed, awake, alert and in no acute distress  CV: normal rate and regular rhythm  Lungs: Breathing comfortably on room air with no tachypnea, hypoxia, or increased work of breathing  Abdomen: soft, non-tender, non-distended    Plan:  Cardiac work-up  Admit to observation for syncope versus seizure          Keke Drew MD   Attending Emergency  Physician              Keke Drew MD  09/08/20 1640

## 2020-09-09 LAB
ABSOLUTE EOS #: 0.09 K/UL (ref 0–0.44)
ABSOLUTE IMMATURE GRANULOCYTE: <0.03 K/UL (ref 0–0.3)
ABSOLUTE LYMPH #: 2.83 K/UL (ref 1.1–3.7)
ABSOLUTE MONO #: 0.59 K/UL (ref 0.1–1.2)
ANION GAP SERPL CALCULATED.3IONS-SCNC: 12 MMOL/L (ref 9–17)
BASOPHILS # BLD: 1 % (ref 0–2)
BASOPHILS ABSOLUTE: 0.03 K/UL (ref 0–0.2)
BUN BLDV-MCNC: 14 MG/DL (ref 8–23)
BUN/CREAT BLD: NORMAL (ref 9–20)
CALCIUM SERPL-MCNC: 8.8 MG/DL (ref 8.6–10.4)
CHLORIDE BLD-SCNC: 106 MMOL/L (ref 98–107)
CO2: 20 MMOL/L (ref 20–31)
CREAT SERPL-MCNC: 0.69 MG/DL (ref 0.5–0.9)
DIFFERENTIAL TYPE: ABNORMAL
EOSINOPHILS RELATIVE PERCENT: 2 % (ref 1–4)
ESTIMATED AVERAGE GLUCOSE: 128 MG/DL
GFR AFRICAN AMERICAN: >60 ML/MIN
GFR NON-AFRICAN AMERICAN: >60 ML/MIN
GFR SERPL CREATININE-BSD FRML MDRD: NORMAL ML/MIN/{1.73_M2}
GFR SERPL CREATININE-BSD FRML MDRD: NORMAL ML/MIN/{1.73_M2}
GLUCOSE BLD-MCNC: 85 MG/DL (ref 70–99)
HBA1C MFR BLD: 6.1 % (ref 4–6)
HCT VFR BLD CALC: 38 % (ref 36.3–47.1)
HEMOGLOBIN: 12 G/DL (ref 11.9–15.1)
IMMATURE GRANULOCYTES: 0 %
LV EF: 55 %
LVEF MODALITY: NORMAL
LYMPHOCYTES # BLD: 60 % (ref 24–43)
MCH RBC QN AUTO: 28.8 PG (ref 25.2–33.5)
MCHC RBC AUTO-ENTMCNC: 31.6 G/DL (ref 28.4–34.8)
MCV RBC AUTO: 91.3 FL (ref 82.6–102.9)
MONOCYTES # BLD: 13 % (ref 3–12)
NRBC AUTOMATED: 0 PER 100 WBC
PDW BLD-RTO: 13.1 % (ref 11.8–14.4)
PLATELET # BLD: 197 K/UL (ref 138–453)
PLATELET ESTIMATE: ABNORMAL
PMV BLD AUTO: 10.1 FL (ref 8.1–13.5)
POTASSIUM SERPL-SCNC: 4.3 MMOL/L (ref 3.7–5.3)
RBC # BLD: 4.16 M/UL (ref 3.95–5.11)
RBC # BLD: ABNORMAL 10*6/UL
SEG NEUTROPHILS: 24 % (ref 36–65)
SEGMENTED NEUTROPHILS ABSOLUTE COUNT: 1.14 K/UL (ref 1.5–8.1)
SODIUM BLD-SCNC: 138 MMOL/L (ref 135–144)
TROPONIN INTERP: NORMAL
TROPONIN T: NORMAL NG/ML
TROPONIN, HIGH SENSITIVITY: 7 NG/L (ref 0–14)
WBC # BLD: 4.7 K/UL (ref 3.5–11.3)
WBC # BLD: ABNORMAL 10*3/UL

## 2020-09-09 PROCEDURE — 93306 TTE W/DOPPLER COMPLETE: CPT

## 2020-09-09 PROCEDURE — G0378 HOSPITAL OBSERVATION PER HR: HCPCS

## 2020-09-09 PROCEDURE — 93005 ELECTROCARDIOGRAM TRACING: CPT | Performed by: STUDENT IN AN ORGANIZED HEALTH CARE EDUCATION/TRAINING PROGRAM

## 2020-09-09 PROCEDURE — 6370000000 HC RX 637 (ALT 250 FOR IP): Performed by: STUDENT IN AN ORGANIZED HEALTH CARE EDUCATION/TRAINING PROGRAM

## 2020-09-09 PROCEDURE — 80048 BASIC METABOLIC PNL TOTAL CA: CPT

## 2020-09-09 PROCEDURE — 6370000000 HC RX 637 (ALT 250 FOR IP): Performed by: PSYCHIATRY & NEUROLOGY

## 2020-09-09 PROCEDURE — 2580000003 HC RX 258: Performed by: STUDENT IN AN ORGANIZED HEALTH CARE EDUCATION/TRAINING PROGRAM

## 2020-09-09 PROCEDURE — 99220 PR INITIAL OBSERVATION CARE/DAY 70 MINUTES: CPT | Performed by: PSYCHIATRY & NEUROLOGY

## 2020-09-09 PROCEDURE — 6370000000 HC RX 637 (ALT 250 FOR IP): Performed by: INTERNAL MEDICINE

## 2020-09-09 PROCEDURE — 83036 HEMOGLOBIN GLYCOSYLATED A1C: CPT

## 2020-09-09 PROCEDURE — 96372 THER/PROPH/DIAG INJ SC/IM: CPT

## 2020-09-09 PROCEDURE — 2580000003 HC RX 258: Performed by: INTERNAL MEDICINE

## 2020-09-09 PROCEDURE — 6360000002 HC RX W HCPCS: Performed by: STUDENT IN AN ORGANIZED HEALTH CARE EDUCATION/TRAINING PROGRAM

## 2020-09-09 PROCEDURE — 85025 COMPLETE CBC W/AUTO DIFF WBC: CPT

## 2020-09-09 PROCEDURE — 36415 COLL VENOUS BLD VENIPUNCTURE: CPT

## 2020-09-09 PROCEDURE — 84484 ASSAY OF TROPONIN QUANT: CPT

## 2020-09-09 PROCEDURE — 6370000000 HC RX 637 (ALT 250 FOR IP): Performed by: EMERGENCY MEDICINE

## 2020-09-09 RX ORDER — SODIUM CHLORIDE 0.9 % (FLUSH) 0.9 %
10 SYRINGE (ML) INJECTION PRN
Status: DISCONTINUED | OUTPATIENT
Start: 2020-09-09 | End: 2020-09-10 | Stop reason: HOSPADM

## 2020-09-09 RX ORDER — FLUDROCORTISONE ACETATE 0.1 MG/1
0.1 TABLET ORAL DAILY
Status: DISCONTINUED | OUTPATIENT
Start: 2020-09-09 | End: 2020-09-10 | Stop reason: HOSPADM

## 2020-09-09 RX ORDER — GABAPENTIN 300 MG/1
300 CAPSULE ORAL 3 TIMES DAILY
Status: DISCONTINUED | OUTPATIENT
Start: 2020-09-09 | End: 2020-09-10 | Stop reason: HOSPADM

## 2020-09-09 RX ORDER — SODIUM CHLORIDE 0.9 % (FLUSH) 0.9 %
10 SYRINGE (ML) INJECTION EVERY 12 HOURS SCHEDULED
Status: DISCONTINUED | OUTPATIENT
Start: 2020-09-09 | End: 2020-09-10 | Stop reason: HOSPADM

## 2020-09-09 RX ORDER — 0.9 % SODIUM CHLORIDE 0.9 %
1000 INTRAVENOUS SOLUTION INTRAVENOUS ONCE
Status: COMPLETED | OUTPATIENT
Start: 2020-09-09 | End: 2020-09-09

## 2020-09-09 RX ORDER — FAMOTIDINE 20 MG/1
20 TABLET, FILM COATED ORAL 2 TIMES DAILY
Status: DISCONTINUED | OUTPATIENT
Start: 2020-09-09 | End: 2020-09-10 | Stop reason: HOSPADM

## 2020-09-09 RX ADMIN — FLUDROCORTISONE ACETATE 0.1 MG: 0.1 TABLET ORAL at 10:26

## 2020-09-09 RX ADMIN — OXYCODONE HYDROCHLORIDE 5 MG: 5 TABLET ORAL at 16:16

## 2020-09-09 RX ADMIN — GABAPENTIN 300 MG: 300 CAPSULE ORAL at 22:00

## 2020-09-09 RX ADMIN — OXYCODONE HYDROCHLORIDE 5 MG: 5 TABLET ORAL at 22:00

## 2020-09-09 RX ADMIN — SODIUM CHLORIDE, PRESERVATIVE FREE 10 ML: 5 INJECTION INTRAVENOUS at 22:11

## 2020-09-09 RX ADMIN — FAMOTIDINE 20 MG: 20 TABLET, FILM COATED ORAL at 10:26

## 2020-09-09 RX ADMIN — SODIUM CHLORIDE, PRESERVATIVE FREE 10 ML: 5 INJECTION INTRAVENOUS at 09:17

## 2020-09-09 RX ADMIN — ENOXAPARIN SODIUM 40 MG: 40 INJECTION SUBCUTANEOUS at 10:26

## 2020-09-09 RX ADMIN — FAMOTIDINE 20 MG: 20 TABLET, FILM COATED ORAL at 22:00

## 2020-09-09 RX ADMIN — OXYCODONE HYDROCHLORIDE 5 MG: 5 TABLET ORAL at 06:02

## 2020-09-09 RX ADMIN — SODIUM CHLORIDE 1000 ML: 9 INJECTION, SOLUTION INTRAVENOUS at 09:55

## 2020-09-09 RX ADMIN — GABAPENTIN 300 MG: 300 CAPSULE ORAL at 16:17

## 2020-09-09 RX ADMIN — OXYCODONE HYDROCHLORIDE 10 MG: 5 TABLET ORAL at 10:26

## 2020-09-09 ASSESSMENT — PAIN DESCRIPTION - ONSET: ONSET: ON-GOING

## 2020-09-09 ASSESSMENT — PAIN DESCRIPTION - PROGRESSION
CLINICAL_PROGRESSION: NOT CHANGED

## 2020-09-09 ASSESSMENT — PAIN DESCRIPTION - DESCRIPTORS: DESCRIPTORS: SHARP;NUMBNESS;TINGLING

## 2020-09-09 ASSESSMENT — PAIN SCALES - GENERAL
PAINLEVEL_OUTOF10: 8
PAINLEVEL_OUTOF10: 6
PAINLEVEL_OUTOF10: 7
PAINLEVEL_OUTOF10: 5
PAINLEVEL_OUTOF10: 8
PAINLEVEL_OUTOF10: 8
PAINLEVEL_OUTOF10: 2

## 2020-09-09 ASSESSMENT — PAIN DESCRIPTION - FREQUENCY: FREQUENCY: CONTINUOUS

## 2020-09-09 ASSESSMENT — PAIN - FUNCTIONAL ASSESSMENT: PAIN_FUNCTIONAL_ASSESSMENT: ACTIVITIES ARE NOT PREVENTED

## 2020-09-09 ASSESSMENT — PAIN DESCRIPTION - ORIENTATION: ORIENTATION: MID;LOWER

## 2020-09-09 ASSESSMENT — PAIN DESCRIPTION - PAIN TYPE: TYPE: ACUTE PAIN

## 2020-09-09 ASSESSMENT — PAIN DESCRIPTION - LOCATION: LOCATION: ABDOMEN;LEG

## 2020-09-09 NOTE — H&P
[facility]  CDU / OBSERVATION eNCOUnter  Resident Note     Pt Name: Elaine Hernandez  MRN: [id number]  [place] 1955  Date of evaluation: 9/9/20  Patient's PCP is :  Thelbert Rubinstein, MD    CHIEF COMPLAINT       Chief Complaint   Patient presents with    Shortness of Breath     Off and on for several months. Reported had a chemical exposure    Abdominal Pain     Several days. Tenderness         HISTORY OF PRESENT ILLNESS    Elaine Hernandez is a 59 y.o. female who presents complaints of multiple syncopal episodes over the last 2 weeks. 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 When patient does work, patient reports acute onset of chest pain, shortness of breath with pain radiating to the left arm. Patient reports syncopal episodes as occurring without warning. Uncertain about falls. Patient states these are witnessed and witnesses deny any convulsive activity. Denies fevers, chills, headache, dizziness, blurred vision, nausea, vomiting, or diarrhea. She has a history of COPD, but no history of coronary artery disease, hypertension, hyperlipidemia, or diabetes. Patient is a 50+ pack year smoker, but has been working towards cessation and now smokes 3 cigarettes/day. Notes lightheadedness after smoking.     Location/Symptom: Loss of consciousness, tightness in chest  Timing/Onset: 2 weeks  Provocation: Unprovoked  Quality: Tightness  Radiation: Left arm  Severity: Moderate  Timing/Duration: Intermittent  Modifying Factors: None    REVIEW OF SYSTEMS       General ROS - No fevers, No malaise   Ophthalmic ROS - No discharge, No changes in vision  ENT ROS -  No sore throat, No rhinorrhea,   Respiratory ROS - no shortness of breath, no cough, no  wheezing  Cardiovascular ROS - No chest pain, no dyspnea on exertion  Gastrointestinal ROS - No abdominal pain, no nausea or vomiting, no change in bowel habits, no black or bloody stools  Genito-Urinary ROS - No dysuria, trouble voiding, or hematuria  Musculoskeletal ROS - No myalgias, No arthalgias  Neurological ROS - No headache, no dizziness/lightheadedness, No focal weakness, no loss of sensation  Dermatological ROS - No lesions, No rash     (PQRS) Advance directives on face sheet per hospital policy. No change unless specifically mentioned in chart    PAST MEDICAL HISTORY    has a past medical history of Emphysema (subcutaneous) (surgical) resulting from a procedure, GERD (gastroesophageal reflux disease), and H. pylori infection. I have reviewed the past medical history with the patient and it is potentially pertinent to this complaint. SURGICAL HISTORY      has no past surgical history on file. I have reviewed and agree with Surgical History entered and it is not pertinent to this complaint. CURRENT MEDICATIONS     sodium chloride flush 0.9 % injection 10 mL, 2 times per day  sodium chloride flush 0.9 % injection 10 mL, PRN  acetaminophen (TYLENOL) tablet 650 mg, Q4H PRN  enoxaparin (LOVENOX) injection 40 mg, Daily  oxyCODONE (ROXICODONE) immediate release tablet 5 mg, Q4H PRN    Or  oxyCODONE (ROXICODONE) immediate release tablet 10 mg, Q4H PRN  fentaNYL (SUBLIMAZE) injection 25 mcg, Q1H PRN    Or  fentaNYL (SUBLIMAZE) injection 50 mcg, Q1H PRN        All medication charted and reviewed. ALLERGIES     is allergic to decadron [dexamethasone]; iv dye [iodides]; and penicillins. FAMILY HISTORY     has no family status information on file. family history is not on file. The patient denies any pertinent family history. I have reviewed and agree with the family history entered. I have reviewed the Family History and it is not significant to the case    SOCIAL HISTORY      reports that she has been smoking. She has never used smokeless tobacco.  I have reviewed and agree with all Social.  There are no concerns for substance abuse/use.     PHYSICAL EXAM     INITIAL no acute osseous abnormality. There are surgical clips overlying the right     Increased lung markings at the bilateral infrahilar regions, may be related to mild bronchitis. LABS:  I have reviewed and interpreted all available lab results. Labs Reviewed   CBC WITH AUTO DIFFERENTIAL - Abnormal; Notable for the following components:       Result Value    Lymphocytes 48 (*)     Immature Granulocytes 1 (*)     All other components within normal limits   COMPREHENSIVE METABOLIC PANEL - Abnormal; Notable for the following components:    Glucose 115 (*)     AST 38 (*)     All other components within normal limits   URINE RT REFLEX TO CULTURE - Abnormal; Notable for the following components:    Leukocyte Esterase, Urine TRACE (*)     All other components within normal limits   CBC WITH AUTO DIFFERENTIAL - Abnormal; Notable for the following components:    Seg Neutrophils 24 (*)     Lymphocytes 60 (*)     Monocytes 13 (*)     Segs Absolute 1.14 (*)     All other components within normal limits   LIPASE   TROPONIN   TROPONIN   MICROSCOPIC URINALYSIS   BASIC METABOLIC PANEL   TROPONIN   TROPONIN   TROPONIN         CDU IMPRESSION / Eric Mary is a 59 y.o. female who presents with episodes of loss of consciousness. Syncope versus seizures. Potentially confounded by chest pain and dyspnea. Heart score is 4 thus additional cardiac work-up is warranted. Industrial toxin exposure also a possibility.     · Follow-up cardiology consult and recommendations  · Follow-up neurology consult and recommendations  · Continue home medications and pain control  · Monitor vitals, labs, and imaging  · DISPO: pending consults and clinical improvement    CONSULTS:    IP CONSULT TO SOCIAL WORK  IP CONSULT TO NEUROLOGY  IP CONSULT TO CARDIOLOGY    PROCEDURES:  Not indicated       PATIENT REFERRED TO:    Christoph Webb MD  [address] [facility]  [address] [place]  [address]  [phone]    Call today      [facility]

## 2020-09-09 NOTE — PROGRESS NOTES
[address] [facility]  CDU / OBSERVATION eNCOUnter  Attending NOte       I performed a history and physical examination of the patient and discussed management with the resident. I reviewed the residents note and agree with the documented findings and plan of care. Any areas of disagreement are noted on the chart. I was personally present for the key portions of any procedures. I have documented in the chart those procedures where I was not present during the key portions. I have reviewed the nurses notes. I agree with the chief complaint, past medical history, past surgical history, allergies, medications, social and family history as documented unless otherwise noted below. The Family history, social history, and ROS are effectively unchanged since admission unless noted elsewhere in the chart. Patient with several episodes of \"blacking out\" unclear whether syncopal episode versus seizure. Possible environmental or occupational exposure. Patient's history is difficult to obtain in terms of specifics surrounding events. There does not seem to be an associated stimulus. Patient is unable to elaborate significantly. Patient denied tongue biting or incontinence. Appreciate cardiology and neurology involvement. Will further work-up patient with echocardiogram and EEG. 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 Will try to work with patient in order to get appropriate studies done.     Bret Cid MD  Attending Emergency  Physician

## 2020-09-09 NOTE — ED PROVIDER NOTES
[address] [facility]  Emergency Department  Emergency Medicine Resident Sign-out     Care of Gautam Bryant was assumed from Dr. Jimenez Shaw and is being seen for Shortness of Breath (Off and on for several months. Reported had a chemical exposure) and Abdominal Pain (Several days. Tenderness)  . The patient's initial evaluation and plan have been discussed with the prior provider who initially evaluated the patient.      EMERGENCY DEPARTMENT COURSE / MEDICAL DECISION MAKING:       MEDICATIONS GIVEN:  Orders Placed This Encounter   Medications    aluminum & magnesium hydroxide-simethicone (MAALOX) 30 mL, lidocaine viscous hcl (XYLOCAINE) 5 mL (GI COCKTAIL)    albuterol sulfate HFA (VENTOLIN HFA) 108 (90 Base) MCG/ACT inhaler     Sig: Inhale 2 puffs into the lungs 4 times daily as needed for Wheezing     Dispense:  3 Inhaler     Refill:  1    DISCONTD: acetaminophen (TYLENOL) tablet 650 mg    acetaminophen (TYLENOL) tablet 650 mg    enoxaparin (LOVENOX) injection 40 mg    OR Linked Order Group     oxyCODONE (ROXICODONE) immediate release tablet 5 mg     oxyCODONE (ROXICODONE) immediate release tablet 10 mg    OR Linked Order Group     fentaNYL (SUBLIMAZE) injection 25 mcg     fentaNYL (SUBLIMAZE) injection 50 mcg       LABS / RADIOLOGY:     Labs Reviewed   CBC WITH AUTO DIFFERENTIAL - Abnormal; Notable for the following components:       Result Value    Lymphocytes 48 (*)     Immature Granulocytes 1 (*)     All other components within normal limits   COMPREHENSIVE METABOLIC PANEL - Abnormal; Notable for the following components:    Glucose 115 (*)     AST 38 (*)     All other components within normal limits   URINE RT REFLEX TO CULTURE - Abnormal; Notable for the following components:    Leukocyte Esterase, Urine TRACE (*)     All other components within normal limits   LIPASE   TROPONIN   TROPONIN   MICROSCOPIC URINALYSIS   TROPONIN   TROPONIN   CBC WITH AUTO DIFFERENTIAL   BASIC METABOLIC [address]  [phone]  Go to   As needed, If symptoms worsen     DISCHARGE MEDICATIONS: New Prescriptions    ALBUTEROL SULFATE HFA (VENTOLIN HFA) 108 (90 BASE) MCG/ACT INHALER    Inhale 2 puffs into the lungs 4 times daily as needed for [address], DO  Emergency Medicine Resident  [facility], [address] [place] [address]  Resident  09/08/20 8842

## 2020-09-09 NOTE — CONSULTS
Attestation signed by      Attending Physician Statement:    I have discussed the care of  Milo Sanchez , including pertinent history and exam findings, with the Cardiology fellow/resident. I have seen and examined the patient and the key elements of all parts of the encounter have been performed by me. I agree with the assessment, plan and orders as documented by the fellow/resident, after I modified exam findings and plan of treatments, and the final version is my approved version of the assessment. Additional Comments:   Near Syncope  Hypotension- due to dehydration  Noncardiac CP  - check 2d Echo  - will give IVF  - will add florinef    Dispo: if Echo low risk, no further inpatient cardiac workup, follow up in 2 weeks. Discussed with patient and nursing. Thank you for allowing me to participate in the care of this patient, please do not hesitate to call if you have any questions. Bethanie Higuera DO, [facility] [address] [facility]  [facility] [email or web address]  [phone]     [facility] Cardiology    Consult / H&P               Today's Date: 9/9/2020  Patient Name: Milo Sanchez  Date of admission: 9/8/2020  1:26 PM  Patient's age: 59 y.o., 1955  Admission Dx: Syncope and collapse [R55]    Reason for Consult:  Cardiac evaluation    Requesting Physician: Aubrey Philip MD    CHIEF COMPLAINT:  Syncope and collapse     History Obtained From:  patient, electronic medical record    HISTORY OF PRESENT ILLNESS:        Milo Sanchez is a 59 y.o.  [place] female who presented to the ED with numerous complaints on 9/8/20. She complains of extreme fatigue and weakness. 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 She state complains of syncope episodes occurring without warning. She is also experiencing shortness of breath. Her past medical history includes H. Pylori infection, emphysema, GERD. Her blood pressure was low today at 99/66.        Previous mcg, Intravenous, Q1H PRN    Allergies:  Decadron [dexamethasone]; Iv dye [iodides]; and Penicillins    Social History:   reports that she has been smoking. She has never used smokeless tobacco.     Family History: family history is not on file. No h/o sudden cardiac death. REVIEW OF SYSTEMS:    · Constitutional: there has been no unanticipated weight loss. There's been No change in energy level, No change in activity level. · Eyes: No visual changes or diplopia. No scleral icterus. · ENT: No Headaches  · Cardiovascular: No cardiac history  · Respiratory: No previous pulmonary problems, No cough  · Gastrointestinal: No abdominal pain. No change in bowel or bladder habits. · Genitourinary: No dysuria, trouble voiding, or hematuria. · Musculoskeletal:  No gait disturbance, No weakness or joint complaints. · Integumentary: No rash or pruritis. · Neurological: No headache, diplopia, change in muscle strength, numbness or tingling. No change in gait, balance, coordination, mood, affect, memory, mentation, behavior. · Psychiatric: No anxiety, or depression. · Endocrine: No temperature intolerance. No excessive thirst, fluid intake, or urination. No tremor. · Hematologic/Lymphatic: No abnormal bruising or bleeding, blood clots or swollen lymph nodes. · Allergic/Immunologic: No nasal congestion or hives. PHYSICAL EXAM:      BP 96/67   Pulse 65   Temp 98.1 °F (36.7 °C) (Oral)   Resp 18   Ht 5' 6\" (1.676 m)   Wt 148 lb (67.1 kg)   SpO2 100%   BMI 23.89 kg/m²    Constitutional and General Appearance: alert, cooperative, no distress and appears stated age  HEENT: PERRL, no cervical lymphadenopathy. No masses palpable. Normal oral mucosa  Respiratory:  · Normal excursion and expansion without use of accessory muscles  · Resp Auscultation: Good respiratory effort. No for increased work of breathing.  On auscultation: clear to auscultation bilaterally  Cardiovascular:  · The apical impulse is not

## 2020-09-09 NOTE — CONSULTS
Neurology Consult Note        Reason for Consult: Syncope  Requesting Physician: Dr. Vito Newsome: Abdominal pain, shortness of breath, weakness    History Obtained From:  patient, electronic medical record, staff       HISTORY OF PRESENT ILLNESS:              The patient is a 59 y.o. female with significant past medical history of GERD, pulmonary pain, weight loss, gastritis, H. pylori infection, DJD of lumbosacral spine, spinal stenosis, colonic polyps, tobacco abuse, duodenal ulcer, COPD, thyroid abnormalities pression,, who presents with lightheadedness,dizziness leading to passing out,falls. denies any head trauma secondary to these falls. Patient says she just started a new job and works at night and is under a lot of stress. Patient did not work since 2004. Patient is also have lack of sleep now a days. Headache associated with her episodes intermittently. She says usually she has 8/8,bitemporal headache,sharp in nature associated with photophobia in past but for last few weeks the frequency of headaches has increased  But intensity has decreased to 6/10. She has almost daily headaches lasting about 3 minutes,L temporal.. She does not follow up with neurology but mentions following up with neurosurgery in past due to back pain. Patient says currently she is also becoming more forgetful. occassional she has L sided N/T/W . She had   MRI Brain 11/18/19 that was consistent with small vessel disease  Cerebral Angio 11/18/19 -ve  MRA Brain 11/19/19 -ve    SBP 90s-104  CBC -ve  BMP -ve  CXR: mild bronchitis  AST 38 ,lfts otherwise unremarkable  Lipase nl    Neurology was consulted for further evaluation and management. Past Medical History:        Diagnosis Date    Emphysema (subcutaneous) (surgical) resulting from a procedure     GERD (gastroesophageal reflux disease)     H. pylori infection      Past Surgical History:    No past surgical history on file.   Current Medications:   Current 8.1 - 13.5 fL    NRBC Automated 0.0 0.0 per 100 WBC    Differential Type NOT REPORTED     Seg Neutrophils 37 36 - 65 %    Lymphocytes 48 (H) 24 - 43 %    Monocytes 10 3 - 12 %    Eosinophils % 3 1 - 4 %    Basophils 1 0 - 2 %    Immature Granulocytes 1 (H) 0 %    Segs Absolute 2.08 1.50 - 8.10 k/uL    Absolute Lymph # 2.72 1.10 - 3.70 k/uL    Absolute Mono # 0.56 0.10 - 1.20 k/uL    Absolute Eos # 0.16 0.00 - 0.44 k/uL    Basophils Absolute 0.05 0.00 - 0.20 k/uL    Absolute Immature Granulocyte 0.03 0.00 - 0.30 k/uL    WBC Morphology NOT REPORTED     RBC Morphology NOT REPORTED     Platelet Estimate NOT REPORTED    Comprehensive Metabolic Panel    Collection Time: 09/08/20  1:44 PM   Result Value Ref Range    Glucose 115 (H) 70 - 99 mg/dL    BUN 19 8 - 23 mg/dL    CREATININE 0.81 0.50 - 0.90 mg/dL    Bun/Cre Ratio NOT REPORTED 9 - 20    Calcium 9.3 8.6 - 10.4 mg/dL    Sodium 141 135 - 144 mmol/L    Potassium 4.4 3.7 - 5.3 mmol/L    Chloride 105 98 - 107 mmol/L    CO2 25 20 - 31 mmol/L    Anion Gap 11 9 - 17 mmol/L    Alkaline Phosphatase 51 35 - 104 U/L    ALT 22 5 - 33 U/L    AST 38 (H) <32 U/L    Total Bilirubin 0.38 0.3 - 1.2 mg/dL    Total Protein 6.9 6.4 - 8.3 g/dL    Alb 4.1 3.5 - 5.2 g/dL    Albumin/Globulin Ratio 1.5 1.0 - 2.5    GFR Non-African American >60 >60 mL/min    GFR African American >60 >60 mL/min    GFR Comment          GFR Staging NOT REPORTED    LIPASE    Collection Time: 09/08/20  1:44 PM   Result Value Ref Range    Lipase 24 13 - 60 U/L   Troponin    Collection Time: 09/08/20  1:44 PM   Result Value Ref Range    Troponin, High Sensitivity 6 0 - 14 ng/L    Troponin T NOT REPORTED <0.03 ng/mL    Troponin Interp NOT REPORTED    EKG 12 Lead    Collection Time: 09/08/20  2:00 PM   Result Value Ref Range    Ventricular Rate 78 BPM    Atrial Rate 78 BPM    P-R Interval 158 ms    QRS Duration 88 ms    Q-T Interval 388 ms    QTc Calculation (Bazett) 442 ms    P Axis 70 degrees    R Axis 32 degrees    T Axis 46 degrees   Troponin    Collection Time: 09/08/20  4:28 PM   Result Value Ref Range    Troponin, High Sensitivity 6 0 - 14 ng/L    Troponin T NOT REPORTED <0.03 ng/mL    Troponin Interp NOT REPORTED    Urinalysis Reflex to Culture    Collection Time: 09/08/20  5:45 PM    Specimen: Urine, clean catch   Result Value Ref Range    Color, UA YELLOW YELLOW    Turbidity UA CLEAR CLEAR    Glucose, Ur NEGATIVE NEGATIVE    Bilirubin Urine NEGATIVE NEGATIVE    Ketones, Urine NEGATIVE NEGATIVE    Specific Gravity, UA 1.025 1.005 - 1.030    Urine Hgb NEGATIVE NEGATIVE    pH, UA 7.0 5.0 - 8.0    Protein, UA NEGATIVE NEGATIVE    Urobilinogen, Urine Normal Normal    Nitrite, Urine NEGATIVE NEGATIVE    Leukocyte Esterase, Urine TRACE (A) NEGATIVE    Urinalysis Comments NOT REPORTED    Microscopic Urinalysis    Collection Time: 09/08/20  5:45 PM   Result Value Ref Range    -          WBC, UA 2 TO 5 0 - 5 /HPF    RBC, UA 0 TO 2 0 - 4 /HPF    Casts UA  0 - 8 /LPF     0 TO 2 HYALINE Reference range defined for non-centrifuged specimen. Crystals, UA NOT REPORTED None /HPF    Epithelial Cells UA 2 TO 5 0 - 5 /HPF    Renal Epithelial, UA NOT REPORTED 0 /HPF    Bacteria, UA NOT REPORTED None    Mucus, UA NOT REPORTED None    Trichomonas, UA NOT REPORTED None    Amorphous, UA NOT REPORTED None    Other Observations UA NOT REPORTED NOT REQ.     Yeast, UA NOT REPORTED None   BASIC METABOLIC PANEL    Collection Time: 09/09/20  5:40 AM   Result Value Ref Range    Glucose 85 70 - 99 mg/dL    BUN 14 8 - 23 mg/dL    CREATININE 0.69 0.50 - 0.90 mg/dL    Bun/Cre Ratio NOT REPORTED 9 - 20    Calcium 8.8 8.6 - 10.4 mg/dL    Sodium 138 135 - 144 mmol/L    Potassium 4.3 3.7 - 5.3 mmol/L    Chloride 106 98 - 107 mmol/L    CO2 20 20 - 31 mmol/L    Anion Gap 12 9 - 17 mmol/L    GFR Non-African American >60 >60 mL/min    GFR African American >60 >60 mL/min    GFR Comment          GFR Staging NOT REPORTED    CBC WITH AUTO DIFFERENTIAL    Collection Time: 09/09/20  5:40 AM   Result Value Ref Range    WBC 4.7 3.5 - 11.3 k/uL    RBC 4.16 3.95 - 5.11 m/uL    Hemoglobin 12.0 11.9 - 15.1 g/dL    Hematocrit 38.0 36.3 - 47.1 %    MCV 91.3 82.6 - 102.9 fL    MCH 28.8 25.2 - 33.5 pg    MCHC 31.6 28.4 - 34.8 g/dL    RDW 13.1 11.8 - 14.4 %    Platelets 823 496 - 658 k/uL    MPV 10.1 8.1 - 13.5 fL    NRBC Automated 0.0 0.0 per 100 WBC    Differential Type NOT REPORTED     Seg Neutrophils 24 (L) 36 - 65 %    Lymphocytes 60 (H) 24 - 43 %    Monocytes 13 (H) 3 - 12 %    Eosinophils % 2 1 - 4 %    Basophils 1 0 - 2 %    Immature Granulocytes 0 0 %    Segs Absolute 1.14 (L) 1.50 - 8.10 k/uL    Absolute Lymph # 2.83 1.10 - 3.70 k/uL    Absolute Mono # 0.59 0.10 - 1.20 k/uL    Absolute Eos # 0.09 0.00 - 0.44 k/uL    Basophils Absolute 0.03 0.00 - 0.20 k/uL    Absolute Immature Granulocyte <0.03 0.00 - 0.30 k/uL    WBC Morphology NOT REPORTED     RBC Morphology NOT REPORTED     Platelet Estimate NOT REPORTED    Troponin    Collection Time: 09/09/20  5:40 AM   Result Value Ref Range    Troponin, High Sensitivity 7 0 - 14 ng/L    Troponin T NOT REPORTED <0.03 ng/mL    Troponin Interp NOT REPORTED    EKG 12 Lead    Collection Time: 09/09/20  5:44 AM   Result Value Ref Range    Ventricular Rate 66 BPM    Atrial Rate 66 BPM    P-R Interval 160 ms    QRS Duration 84 ms    Q-T Interval 418 ms    QTc Calculation (Bazett) 438 ms    P Axis 69 degrees    R Axis 37 degrees    T Axis 48 degrees       IMAGING  As above  IMPRESSION/Recommendations: This is a 58 y/o F with PMHx of HTN,Smoking,IBS,Anxiety,Thyroid abnormalities,dudenal ulcer with syncope likely stress related due to new job,lack of sleep,poor po intake. Will get orthostatics  F/u EEG to r/o seizures. Will not repeat MRI Brain since no focal deficit and last MRI Brain in 2019 was unremarkable. F/u Echo ,cardiology on board    Thank you for the consultation. Will follow. Case was discussed with Dr. Ashok Horner.     Lopez Barr Mariajose  Neurology Resident PGY-4  9/9/2020 at 9:48 AM

## 2020-09-09 NOTE — ED NOTES
Pt provided crackers and [address] [place] [address] per request. Pt denies any further needs at this time     Hernán PierrePal[facility] Healthcare  09/08/20 4455

## 2020-09-09 NOTE — ED NOTES
ED to inpatient nurses report    Chief Complaint   Patient presents with    Shortness of Breath     Off and on for several months. Reported had a chemical exposure    Abdominal Pain     Several days.  Tenderness      Present to ED from home  LOC: alert and orientated to name, place, date  Vital signs   Vitals:    09/08/20 2002 09/08/20 2021 09/08/20 2111 09/08/20 2304   BP: 95/72      Pulse: 73 72 70 66   Resp:       Temp:       TempSrc:       SpO2:       Weight:       Height:          Oxygen Baseline:RA    Current needs required None   LDAs:    Mobility: Independent  Pending ED orders: na  Present condition: stable  Code Status: Full Code  Consults:  []  Hospitalist  Completed  [] yes [] no  []  Medicine  Completed  [] yes [] No  []  Cardiology  Completed  [] yes [] No  []  GI   Completed  [] yes [] No  []  Neurology  Completed  [] yes [] No  []  Nephrology Completed  [] yes [] No  []  Vascular  Completed  [] yes [] No   []  Surgery  Completed  [] yes [] No   []  Urology  Completed  [] yes [] No   []  Plastics  Completed  [] yes [] No   []  ENT  Completed  [] yes [] No   []  Other None    Completed  [x] yes [] No  Pertinent event(s)  Pertinent event(s)  Electronically signed by Shira Hanley RN on 9/8/2020 at 11:14 PM     Shira Hanley, [address] [place] [address]  09/08/20 4318

## 2020-09-09 NOTE — PROGRESS NOTES
Pt. Does not wish to have EEG done at this time due to weave. It is glued to her head and cannot come on and off. Will contact nurse.

## 2020-09-10 VITALS
WEIGHT: 148 LBS | HEIGHT: 66 IN | TEMPERATURE: 99.2 F | OXYGEN SATURATION: 95 % | RESPIRATION RATE: 16 BRPM | SYSTOLIC BLOOD PRESSURE: 107 MMHG | HEART RATE: 65 BPM | BODY MASS INDEX: 23.78 KG/M2 | DIASTOLIC BLOOD PRESSURE: 68 MMHG

## 2020-09-10 LAB
EKG ATRIAL RATE: 66 BPM
EKG P AXIS: 69 DEGREES
EKG P-R INTERVAL: 160 MS
EKG Q-T INTERVAL: 418 MS
EKG QRS DURATION: 84 MS
EKG QTC CALCULATION (BAZETT): 438 MS
EKG R AXIS: 37 DEGREES
EKG T AXIS: 48 DEGREES
EKG VENTRICULAR RATE: 66 BPM
TSH SERPL DL<=0.05 MIU/L-ACNC: 1 MIU/L (ref 0.3–5)

## 2020-09-10 PROCEDURE — 36415 COLL VENOUS BLD VENIPUNCTURE: CPT

## 2020-09-10 PROCEDURE — 6370000000 HC RX 637 (ALT 250 FOR IP): Performed by: STUDENT IN AN ORGANIZED HEALTH CARE EDUCATION/TRAINING PROGRAM

## 2020-09-10 PROCEDURE — 2580000003 HC RX 258: Performed by: STUDENT IN AN ORGANIZED HEALTH CARE EDUCATION/TRAINING PROGRAM

## 2020-09-10 PROCEDURE — G0378 HOSPITAL OBSERVATION PER HR: HCPCS

## 2020-09-10 PROCEDURE — 84443 ASSAY THYROID STIM HORMONE: CPT

## 2020-09-10 PROCEDURE — 6370000000 HC RX 637 (ALT 250 FOR IP): Performed by: EMERGENCY MEDICINE

## 2020-09-10 PROCEDURE — 97162 PT EVAL MOD COMPLEX 30 MIN: CPT

## 2020-09-10 PROCEDURE — 97535 SELF CARE MNGMENT TRAINING: CPT

## 2020-09-10 PROCEDURE — 6370000000 HC RX 637 (ALT 250 FOR IP): Performed by: INTERNAL MEDICINE

## 2020-09-10 PROCEDURE — 99225 PR SBSQ OBSERVATION CARE/DAY 25 MINUTES: CPT | Performed by: NURSE PRACTITIONER

## 2020-09-10 PROCEDURE — 97166 OT EVAL MOD COMPLEX 45 MIN: CPT

## 2020-09-10 PROCEDURE — 6370000000 HC RX 637 (ALT 250 FOR IP): Performed by: PSYCHIATRY & NEUROLOGY

## 2020-09-10 PROCEDURE — 6360000002 HC RX W HCPCS: Performed by: STUDENT IN AN ORGANIZED HEALTH CARE EDUCATION/TRAINING PROGRAM

## 2020-09-10 PROCEDURE — 83655 ASSAY OF LEAD: CPT

## 2020-09-10 PROCEDURE — 93010 ELECTROCARDIOGRAM REPORT: CPT | Performed by: INTERNAL MEDICINE

## 2020-09-10 PROCEDURE — 97530 THERAPEUTIC ACTIVITIES: CPT

## 2020-09-10 PROCEDURE — 96372 THER/PROPH/DIAG INJ SC/IM: CPT

## 2020-09-10 RX ORDER — FLUDROCORTISONE ACETATE 0.1 MG/1
0.1 TABLET ORAL DAILY
Qty: 30 TABLET | Refills: 3 | Status: SHIPPED | OUTPATIENT
Start: 2020-09-11 | End: 2020-10-11

## 2020-09-10 RX ORDER — OXYCODONE HYDROCHLORIDE 5 MG/1
5 TABLET ORAL EVERY 4 HOURS PRN
Qty: 12 TABLET | Refills: 0 | Status: SHIPPED | OUTPATIENT
Start: 2020-09-10 | End: 2020-09-13

## 2020-09-10 RX ORDER — ALBUTEROL SULFATE 90 UG/1
2 AEROSOL, METERED RESPIRATORY (INHALATION) 4 TIMES DAILY PRN
Qty: 3 INHALER | Refills: 1 | Status: SHIPPED | OUTPATIENT
Start: 2020-09-10 | End: 2021-04-10 | Stop reason: SDUPTHER

## 2020-09-10 RX ORDER — OXYCODONE HYDROCHLORIDE 5 MG/1
5 TABLET ORAL EVERY 4 HOURS PRN
Qty: 12 TABLET | Refills: 0 | Status: SHIPPED | OUTPATIENT
Start: 2020-09-10 | End: 2020-09-10

## 2020-09-10 RX ADMIN — FLUDROCORTISONE ACETATE 0.1 MG: 0.1 TABLET ORAL at 08:46

## 2020-09-10 RX ADMIN — OXYCODONE HYDROCHLORIDE 5 MG: 5 TABLET ORAL at 02:19

## 2020-09-10 RX ADMIN — ENOXAPARIN SODIUM 40 MG: 40 INJECTION SUBCUTANEOUS at 08:47

## 2020-09-10 RX ADMIN — FAMOTIDINE 20 MG: 20 TABLET, FILM COATED ORAL at 08:46

## 2020-09-10 RX ADMIN — GABAPENTIN 300 MG: 300 CAPSULE ORAL at 08:46

## 2020-09-10 RX ADMIN — OXYCODONE HYDROCHLORIDE 5 MG: 5 TABLET ORAL at 12:17

## 2020-09-10 RX ADMIN — SODIUM CHLORIDE, PRESERVATIVE FREE 10 ML: 5 INJECTION INTRAVENOUS at 08:49

## 2020-09-10 RX ADMIN — OXYCODONE HYDROCHLORIDE 5 MG: 5 TABLET ORAL at 06:58

## 2020-09-10 ASSESSMENT — PAIN DESCRIPTION - ORIENTATION
ORIENTATION: RIGHT
ORIENTATION: RIGHT;LOWER
ORIENTATION: RIGHT

## 2020-09-10 ASSESSMENT — PAIN SCALES - GENERAL
PAINLEVEL_OUTOF10: 9
PAINLEVEL_OUTOF10: 3
PAINLEVEL_OUTOF10: 3
PAINLEVEL_OUTOF10: 5
PAINLEVEL_OUTOF10: 8

## 2020-09-10 ASSESSMENT — PAIN DESCRIPTION - PAIN TYPE
TYPE: ACUTE PAIN

## 2020-09-10 ASSESSMENT — PAIN DESCRIPTION - LOCATION
LOCATION: BACK
LOCATION: BACK;LEG
LOCATION: BACK
LOCATION: BACK;HIP;LEG

## 2020-09-10 ASSESSMENT — PAIN DESCRIPTION - DESCRIPTORS: DESCRIPTORS: ACHING;CRAMPING

## 2020-09-10 ASSESSMENT — PAIN DESCRIPTION - FREQUENCY: FREQUENCY: CONTINUOUS

## 2020-09-10 NOTE — PROGRESS NOTES
mL Intravenous 2 times per day    fludrocortisone  0.1 mg Oral Daily    famotidine  20 mg Oral BID    gabapentin  300 mg Oral TID    enoxaparin  40 mg Subcutaneous Daily       Past Medical History:   Diagnosis Date    Emphysema (subcutaneous) (surgical) resulting from a procedure     GERD (gastroesophageal reflux disease)     H. pylori infection        No past surgical history on file. PHYSICAL EXAM:      Blood pressure 107/68, pulse 65, temperature 99.2 °F (37.3 °C), temperature source Oral, resp. rate 16, height 5' 6\" (1.676 m), weight 148 lb (67.1 kg), SpO2 95 %.       Neurological Examination:  Mental status   Alert and oriented x 3; following all commands; speech is fluent, no dysarthria, aphasia   Cranial nerves   II - visual fields intact to confrontation; pupils reactive  III, IV, VI - extraocular muscles intact; no CONSTANZA; no nystagmus; no ptosis   V - normal facial sensation                                                               VII - normal facial symmetry                                                             VIII - intact hearing                                                                             IX, X - symmetrical palate elevation                                               XI - symmetrical shoulder shrug                                                       XII - midline tongue without atrophy or fasciculation   Motor function  Strength: Was able to lift all limbs antigravity; pain L LE  Normal tone                  Sensory function Diminished sensations in glove and stocking distribution     Cerebellar No visible tremors   Reflex function 2/4 symmetric throughout  Down going plantar response bilaterally   Gait                  Not tested       DATA      Lab Results   Component Value Date    WBC 4.7 09/09/2020    HGB 12.0 09/09/2020    HCT 38.0 09/09/2020     09/09/2020    ALT 22 09/08/2020    AST 38 (H) 09/08/2020     09/09/2020    K 4.3 09/09/2020     09/09/2020    CREATININE 0.69 09/09/2020    BUN 14 09/09/2020    CO2 20 09/09/2020    LABA1C 6.1 (H) 09/09/2020       DIAGNOSTIC DATA:  ECHO (9/9/2020): EF> 55%    EEG: Pt refused due to hair glued to the scalp     ORTHOSTATIC BP:  Supine        95/69  Sitting       104/71  Standing     99/57          PRIOR DATA:  MRI BRAIN (11/2019): No acute intracranial abnormality     MRI L-SPINE (7/2020): Mild lumbar spondylosis    CAROTID DOPPLER (11/2019): ICAs <50% stenosis                IMPRESSION: 59 y.o.  female admitted with  Recurrent syncope and collapse; no reported seizure-like activity. Orthostatics were normal but overall blood pressure on the lower side, most probably due to dehydration and loss of appetite. Patient was educated about proper sleep, small frequent meals, high calorie snacks and proper hydration. She verbalized understanding and was in agreement. Patient refused EEG    Florinef 0.1 mg QD, as per cardiology team    Peripheral neuropathy; continue Neurontin 300 mg TID    Comorbid conditions - HTN, emphysema, duodenal ulcer due to H. pylori, lumbar stenosis s/p decompression lumbar spine, IBS, myalgia, colonic polyps, anxiety, depression    Continue PT/OT; she walked 25' without a device    Neurologically cleared for discharge    Please note that this note was generated using a voice recognition dictation software. Although every effort was made to ensure the accuracy of this automated transcription, some errors in transcription may have occurred.

## 2020-09-10 NOTE — PLAN OF CARE
Problem: Pain:  Goal: Pain level will decrease  Description: Pain level will decrease  Outcome: Ongoing  Goal: Control of acute pain  Description: Control of acute pain  Outcome: Ongoing  Goal: Control of chronic pain  Description: Control of chronic pain  Outcome: Ongoing     Problem: Falls - Risk of:  Goal: Will remain free from falls  Description: Will remain free from falls  Outcome: Met This Shift  Goal: Absence of physical injury  Description: Absence of physical injury  Outcome: Met This Shift

## 2020-09-10 NOTE — PROGRESS NOTES
Patient was discharged home with outpatient therapy. PIV removed per policy. All discharge instructions gone over with patient and all questions answered. Scripts, therapy referral and work notes given to patient. Patient dressed self and gathered all belongings independently. Patient walked down to car with friend at side with no issues.

## 2020-09-10 NOTE — PROGRESS NOTES
[facility]  Progress Note                   Date:   9/10/2020  Patient name: Syd Miller  Date of admission:  9/8/2020  1:26 PM  MRN:   [id number]  YOB: 1955  PCP: Trevor Nguyen MD    Reason for Admission: Syncope and collapse [R55]    Subjective:       Clinical Changes /Abnormalities: Pt. Seen & examined alone in room lying quietly in bed. Denies CP or SOB. Tele SR. States she was a little \"woozy\" this AM but states she is feeling much better now and it is \"nothing at all like it was. \"     Review of Systems    Medications:   Scheduled Meds:   sodium chloride flush  10 mL Intravenous 2 times per day    fludrocortisone  0.1 mg Oral Daily    famotidine  20 mg Oral BID    gabapentin  300 mg Oral TID    enoxaparin  40 mg Subcutaneous Daily     Continuous Infusions:  CBC:   Recent Labs     09/08/20  1344 09/09/20  0540   WBC 5.6 4.7   HGB 13.1 12.0    197     BMP:    Recent Labs     09/08/20  1344 09/09/20  0540    138   K 4.4 4.3    106   CO2 25 20   BUN 19 14   CREATININE 0.81 0.69   GLUCOSE 115* 85     Hepatic:  Recent Labs     09/08/20  1344   AST 38*   ALT 22   BILITOT 0.38   ALKPHOS 51     Troponin:   Recent Labs     09/08/20  1344 09/08/20  1628 09/09/20  0540   TROPHS 6 6 7     BNP: No results for input(s): BNP in the last 72 hours. Lipids: No results for input(s): CHOL, HDL in the last 72 hours. Invalid input(s): LDLCALCU  INR: No results for input(s): INR in the last 72 hours. Objective:   Vitals: /68   Pulse 65   Temp 99.2 °F (37.3 °C) (Oral)   Resp 16   Ht 5' 6\" (1.676 m)   Wt 148 lb (67.1 kg)   SpO2 95%   BMI 23.89 kg/m²   General appearance: alert and cooperative with exam  HEENT: Head: Normocephalic, no lesions, without obvious abnormality.   Neck:no JVD, trachea midline, no adenopathy  Lungs: Clear to auscultation  Heart: Regular rate and rhythm, s1/s2 auscultated, no murmurs  Abdomen: soft, non-tender, bowel sounds active  Extremities: no edema  Neurologic: not done        Assessment / Acute Cardiac Problems:   1. Near syncope - resolved  2. Hypotension secondary to dehydration  3. Non-cardiac CP    Patient Active Problem List:     Syncope and collapse     Episode of loss of consciousness     Pain in left lower leg      Plan of Treatment:   1. Echo completed but read pending  2. Clinically symptoms have improved. Continue PO Florinef. Long discussion regarding liberalizing PO fluid intake. She states she hates Gatoriade and Poweraide and cannot drink those. Discussed avoiding sugar drinks such as lemonaide and soda as well as excessive caffeine. Verb understanding. Will get LE compression stockings. 3. OK for discharge home today as long as echo low risk. Would like to f/u with her PCP as OP.     Electronically signed by LORRAINE Sequeira CNP on 9/10/2020 at 9:39 AM  [address]  [phone]

## 2020-09-10 NOTE — PLAN OF CARE
Problem: Pain:  Goal: Pain level will decrease  Description: Pain level will decrease  9/10/2020 1206 by Riaz Beltran RN  Outcome: Completed  9/10/2020 0115 by Al Wagner  Outcome: Ongoing  Goal: Control of acute pain  Description: Control of acute pain  9/10/2020 1206 by Riaz Beltran RN  Outcome: Completed  9/10/2020 0115 by Al Wagner  Outcome: Ongoing  Goal: Control of chronic pain  Description: Control of chronic pain  9/10/2020 1206 by Riaz Beltran RN  Outcome: Completed  9/10/2020 0115 by Al Wagner  Outcome: Ongoing     Problem: Falls - Risk of:  Goal: Will remain free from falls  Description: Will remain free from falls  9/10/2020 1206 by Riaz Beltran RN  Outcome: Completed  9/10/2020 0115 by Al Wagner  Outcome: Met This Shift  Goal: Absence of physical injury  Description: Absence of physical injury  9/10/2020 1206 by Riaz Beltran RN  Outcome: Completed  9/10/2020 0115 by Al Wagner  Outcome: Met This Shift     Problem: Musculor/Skeletal Functional Status  Goal: Highest potential functional level  Outcome: Completed

## 2020-09-10 NOTE — DISCHARGE SUMMARY
CDU Discharge Summary        Patient:  Yash Hadley  YOB: 1955    MRN: [id number]   Acct: [de-identified]    Primary Care Physician: Golden Pond MD    Admit date:  9/8/2020  1:26 PM  Discharge date: 9/10/2020 12:25 PM    Discharge Diagnoses:     Acute syncopal episode and chest pain due to unknown etiology  Improved with rest, rehydration and nutrition    Follow-up:  Call today/tomorrow for a follow up appointment with Golden Pond MD , or return to the Emergency Room with worsening symptoms    Stressed to patient the importance of following up with primary care doctor for further workup/management of symptoms. Pt verbalizes understanding and agrees with plan. Discharge Medications:  Changes to medications        Ethan Bonillaalicia   Home Medication Instructions [id number]    Printed on:09/10/20 2646   Medication Information                      acetaminophen (TYLENOL) 325 MG tablet  Take 2 tablets by mouth every 6 hours as needed for Pain             albuterol sulfate HFA (VENTOLIN HFA) 108 (90 Base) MCG/ACT inhaler  Inhale 2 puffs into the lungs 4 times daily as needed for Wheezing             fludrocortisone (FLORINEF) 0.1 MG tablet  Take 1 tablet by mouth daily             oxyCODONE (ROXICODONE) 5 MG immediate release tablet  Take 1 tablet by mouth every 4 hours as needed for Pain for up to 3 days.                  Diet:  DIET GENERAL; , Advance as tolerated     Activity:  As tolerated    Consultants: IP CONSULT TO SOCIAL WORK  IP CONSULT TO NEUROLOGY  IP CONSULT TO CARDIOLOGY    Procedures:  Not indicated     Diagnostic Test:   Results for orders placed or performed during the hospital encounter of 09/08/20   CBC WITH AUTO DIFFERENTIAL   Result Value Ref Range    WBC 5.6 3.5 - 11.3 k/uL    RBC 4.50 3.95 - 5.11 m/uL    Hemoglobin 13.1 11.9 - 15.1 g/dL    Hematocrit 41.0 36.3 - 47.1 %    MCV 91.1 82.6 - 102.9 fL    MCH 29.1 25.2 - 33.5 pg    MCHC 32.0 28.4 - 34.8 g/dL RDW 13.2 11.8 - 14.4 %    Platelets 866 943 - 317 k/uL    MPV 10.2 8.1 - 13.5 fL    NRBC Automated 0.0 0.0 per 100 WBC    Differential Type NOT REPORTED     Seg Neutrophils 37 36 - 65 %    Lymphocytes 48 (H) 24 - 43 %    Monocytes 10 3 - 12 %    Eosinophils % 3 1 - 4 %    Basophils 1 0 - 2 %    Immature Granulocytes 1 (H) 0 %    Segs Absolute 2.08 1.50 - 8.10 k/uL    Absolute Lymph # 2.72 1.10 - 3.70 k/uL    Absolute Mono # 0.56 0.10 - 1.20 k/uL    Absolute Eos # 0.16 0.00 - 0.44 k/uL    Basophils Absolute 0.05 0.00 - 0.20 k/uL    Absolute Immature Granulocyte 0.03 0.00 - 0.30 k/uL    WBC Morphology NOT REPORTED     RBC Morphology NOT REPORTED     Platelet Estimate NOT REPORTED    Comprehensive Metabolic Panel   Result Value Ref Range    Glucose 115 (H) 70 - 99 mg/dL    BUN 19 8 - 23 mg/dL    CREATININE 0.81 0.50 - 0.90 mg/dL    Bun/Cre Ratio NOT REPORTED 9 - 20    Calcium 9.3 8.6 - 10.4 mg/dL    Sodium 141 135 - 144 mmol/L    Potassium 4.4 3.7 - 5.3 mmol/L    Chloride 105 98 - 107 mmol/L    CO2 25 20 - 31 mmol/L    Anion Gap 11 9 - 17 mmol/L    Alkaline Phosphatase 51 35 - 104 U/L    ALT 22 5 - 33 U/L    AST 38 (H) <32 U/L    Total Bilirubin 0.38 0.3 - 1.2 mg/dL    Total Protein 6.9 6.4 - 8.3 g/dL    Alb 4.1 3.5 - 5.2 g/dL    Albumin/Globulin Ratio 1.5 1.0 - 2.5    GFR Non-African American >60 >60 mL/min    GFR African American >60 >60 mL/min    GFR Comment          GFR Staging NOT REPORTED    LIPASE   Result Value Ref Range    Lipase 24 13 - 60 U/L   Troponin   Result Value Ref Range    Troponin, High Sensitivity 6 0 - 14 ng/L    Troponin T NOT REPORTED <0.03 ng/mL    Troponin Interp NOT REPORTED    Urinalysis Reflex to Culture    Specimen: Urine, clean catch   Result Value Ref Range    Color, UA YELLOW YELLOW    Turbidity UA CLEAR CLEAR    Glucose, Ur NEGATIVE NEGATIVE    Bilirubin Urine NEGATIVE NEGATIVE    Ketones, Urine NEGATIVE NEGATIVE    Specific Gravity, UA 1.025 1.005 - 1.030    Urine Hgb NEGATIVE the bilateral infrahilar regions, may be related to mild bronchitis. Physical Exam:    General appearance - NAD, AOx 3   Lungs -CTAB, no R/R/R  Heart - RRR, no M/R/G  Abdomen - Soft, NT/ND  Neurological:  MAEx4, No focal motor deficit, sensory loss  Extremities - Cap refil <2 sec in all ext., no edema  Skin -warm, dry      Hospital Course:  Clinical course has improved, labs and imaging reviewed. Melanie Ramey originally presented to the hospital on 9/8/2020  1:26 PM. with acute syncopal episode and acute onset chest pain. At that time it was determined that She required further observation and work-up. She was admitted and labs and imaging were followed daily. Imaging results as above. Etiology is still unclear, but is most likely due to patient's poor hydration and nutritional intake before, during and after her work hours. Additional laboratory results including lead level and TSH are still pending. She will follow-up with her PCP within 1 week for results. She will also follow-up with cardiology in 1 week  She is medically stable to be discharged. Disposition: Home    Patient stated that they will not drive themselves home from the hospital if they have gotten pain killers/ narcotics earlier that day and that they will arrange for transportation on their own or work with the  for a ride. Patient counseled NOT to drive while under the influence of narcotics/ pain killers. Condition: Good    Patient stable and ready for discharge home. I have discussed plan of care with patient and they are in understanding. They were instructed to read discharge paperwork. All of their questions and concerns were addressed. Time Spent: 2 day      --  Jo Morel MD  Emergency Medicine Resident Physician    This dictation was generated by voice recognition computer software.   Although all attempts are made to edit the dictation for accuracy, there may be errors in the transcription that are not intended.

## 2020-09-10 NOTE — PROGRESS NOTES
Physical Therapy    Facility/Department: [address] MED SURG  Initial Assessment    NAME: Chanel Colon  : 1955  MRN: [id number]    Date of Service: 9/10/2020    Discharge Recommendations:  Patient would benefit from continued therapy after discharge   Assessment   Body structures, Functions, Activity limitations: Decreased functional mobility ; Decreased endurance;Decreased strength; Increased pain  Assessment: The pt ambulated 25 ft without a device x CGA. She moved slowly and had a noticeable limp in her gait, though she declined the use of a walker. She could benefit from a continuation of PT following her DC  Prognosis: Good  Decision Making: Medium Complexity  PT Education: Goals;PT Role;Plan of Care  REQUIRES PT FOLLOW UP: Yes  Activity Tolerance  Activity Tolerance: Patient limited by fatigue;Patient limited by pain   Patient Diagnosis(es): The encounter diagnosis was Syncope and collapse.   has a past medical history of Emphysema (subcutaneous) (surgical) resulting from a procedure, GERD (gastroesophageal reflux disease), and H. pylori infection. has no past surgical history on file.   Restrictions  Restrictions/Precautions  Restrictions/Precautions: General Precautions, Fall Risk, Up as Tolerated  Required Braces or Orthoses?: No  Vision/Hearing  Vision: Within Functional Limits  Hearing: Within functional limits     Subjective  General  Patient assessed for rehabilitation services?: Yes  Response To Previous Treatment: Not applicable  Family / Caregiver Present: No  Follows Commands: Within Functional Limits  Subjective  Subjective: RN and pt agreeable to therapy eeval  Pain Screening  Patient Currently in Pain: Yes  Pain Assessment  Pain Assessment: 0-10  Patient's Stated Pain Goal: 9  Pain Location: Back;Leg  Pain Orientation: Right     Orientation  Orientation  Overall Orientation Status: Within Normal Limits  Social/Functional History  Social/Functional History  Lives With: Other CMS 0-100% Score: 41.77 (09/10/20 1141)  Mobility Inpatient CMS G-Code Modifier : CK (09/10/20 1141)     Goals  Short term goals  Time Frame for Short term goals: 10 visits  Short term goal 1: transfers with SBA  Short term goal 2: amb 250 ft without a device x SBA  Short term goal 3: ascend/descend 4 steps with SBA  Short term goal 4: exercise program x SBA  Patient Goals   Patient goals : Improve her ability to be mobile     Therapy Time   Individual Concurrent Group Co-treatment   Time In 0820         Time Out 0844         Minutes 24             1 of 800 [facility], PT

## 2020-09-10 NOTE — PROGRESS NOTES
OBS/CDU   RESIDENT NOTE      Patients PCP is:  Yamel Casitllo MD        SUBJECTIVE      No acute events overnight. Patient reports a full resolution of her symptoms of dizziness and chest pain. Has been able to tolerate a full diet without nausea or vomiting. The patient is urinating on his own and is passing flatus. Denies fever, chills, nausea, vomiting, chest pain, shortness of breath, abdominal pain, focal weakness, numbness, tingling, urinary/bowel symptoms, vision changes, visual hallucinations, or headache. PHYSICAL EXAM      General: NAD, AO X 3  Heent: EMOI, PERRL  Neck: SUPPLE, NO JVD  Cardiovascular: RRR, S1S2  Pulmonary: CTAB, NO SOB  Abdomen: SOFT, NTTP, ND, +BS  Extremities: +2/4 PULSES DISTAL, NO SWELLING  Neuro / Psych: NO NUMBNESS OR TINGLING, MENTATION AT BASELINE    PERTINENT TEST /EXAMS      I have reviewed all available laboratory results. MEDICATIONS CURRENT   sodium chloride flush 0.9 % injection 10 mL, 2 times per day  sodium chloride flush 0.9 % injection 10 mL, PRN  fludrocortisone (FLORINEF) tablet 0.1 mg, Daily  famotidine (PEPCID) tablet 20 mg, BID  gabapentin (NEURONTIN) capsule 300 mg, TID  acetaminophen (TYLENOL) tablet 650 mg, Q4H PRN  enoxaparin (LOVENOX) injection 40 mg, Daily  oxyCODONE (ROXICODONE) immediate release tablet 5 mg, Q4H PRN    Or  oxyCODONE (ROXICODONE) immediate release tablet 10 mg, Q4H PRN  fentaNYL (SUBLIMAZE) injection 25 mcg, Q1H PRN    Or  fentaNYL (SUBLIMAZE) injection 50 mcg, Q1H PRN        All medication charted and reviewed. CONSULTS      IP CONSULT TO SOCIAL WORK  IP CONSULT TO NEUROLOGY  IP CONSULT TO CARDIOLOGY    ASSESSMENT/PLAN       Irene Alarcon is a 59 y.o. female who presents with syncopal episodes and chest pain. Her symptoms have resolved following hydration and adequate intake of nutrition.   Unable to complete the recommended EEG due to a weave in her hair, but low suspicion of seizure now that patient's symptoms have resolved. Echo is still pending a final read, however, suspect this is low risk and patient is stable for discharge. · Arrange follow-up with cardiology as outpatient  · Continue home medications and pain control  · Monitor vitals, labs, and imaging  · DISPO: Discharge home    --  Elly Wilson  Emergency Medicine Resident Physician     This dictation was generated by voice recognition computer software. Although all attempts are made to edit the dictation for accuracy, there may be errors in the transcription that are not intended.

## 2020-09-10 NOTE — PROGRESS NOTES
mobility into bathroom to complete LE ADL activities.  Pt returned to bed, call light in reach and RN notifed on therapist exit     Tone RUE  RUE Tone: Normotonic  Tone LUE  LUE Tone: Normotonic  Coordination  Movements Are Fluid And Coordinated: Yes        Bed mobility  Supine to Sit: Contact guard assistance  Sit to Supine: Contact guard assistance  Scooting: Contact guard assistance     Transfers  Stand Step Transfers: Contact guard assistance  Sit to stand: Contact guard assistance  Stand to sit: Contact guard assistance        Cognition  Overall Cognitive Status: WFL     Perception  Overall Perceptual Status: WFL        Sensation  Overall Sensation Status: Impaired(Numbness/ tingling in B hands and feet)        LUE AROM (degrees)  LUE AROM : WFL  RUE AROM (degrees)  RUE AROM : WFL  LUE Strength  Gross LUE Strength: WFL  RUE Strength  Gross RUE Strength: WFL     Plan   Plan  Times per week: 1-2 total sessions  Current Treatment Recommendations: Functional Mobility Training, Endurance Training, Safety Education & Training, Self-Care / ADL, Patient/Caregiver Education & Training, Equipment Evaluation, Education, & procurement    AM-PAC Score  AM-[facility] Inpatient Daily Activity Raw Score: 19 (09/10/20 1406)  AM-PAC Inpatient ADL T-Scale Score : 40.22 (09/10/20 1406)  ADL Inpatient CMS 0-100% Score: 42.8 (09/10/20 1406)  ADL Inpatient CMS G-Code Modifier : CK (09/10/20 1406)    Goals  Short term goals  Time Frame for Short term goals: by discharge, pt will  Short term goal 1: demo I in UE ADL activities  Short term goal 2: demo I in LE ADL activities  Short term goal 3: demo I in functional transfers/ mobility with good safety awareness during functional activities  Short term goal 4: demo understanding and I use of ECWS, fall prevention and proper pursed lipped breathing tech during functional activities  Patient Goals   Patient goals : to go home       Therapy Time   Individual Concurrent Group Co-treatment   Time In 5939         Time Out 1030         Minutes 42         Timed Code Treatment Minutes: 38 Minutes   See above for LOF. RN reports patient is medically stable for therapy treatment this date. Chart reviewed prior to treatment and patient is agreeable for therapy. All lines intact and patient positioned comfortably at end of treatment. All patient needs addressed prior to ending therapy session.       Xavier Ospina OTR/L

## 2020-09-11 LAB — LEAD BLOOD: 4 UG/DL (ref 0–4)

## 2020-09-18 RX ORDER — ACETAMINOPHEN 325 MG
TABLET ORAL
Qty: 30 TABLET | Refills: 0 | OUTPATIENT
Start: 2020-09-18

## 2020-09-18 RX ORDER — CYCLOBENZAPRINE HCL 5 MG
TABLET ORAL
Qty: 10 TABLET | OUTPATIENT
Start: 2020-09-18

## 2020-11-03 ENCOUNTER — HOSPITAL ENCOUNTER (EMERGENCY)
Age: 65
Discharge: HOME | End: 2020-11-03
Attending: EMERGENCY MEDICINE
Payer: MEDICARE

## 2020-11-03 ENCOUNTER — APPOINTMENT (OUTPATIENT)
Dept: CT IMAGING | Age: 65
End: 2020-11-03
Payer: MEDICARE

## 2020-11-03 ENCOUNTER — APPOINTMENT (OUTPATIENT)
Dept: GENERAL RADIOLOGY | Age: 65
End: 2020-11-03
Payer: MEDICARE

## 2020-11-03 VITALS
WEIGHT: 160 LBS | SYSTOLIC BLOOD PRESSURE: 103 MMHG | RESPIRATION RATE: 17 BRPM | OXYGEN SATURATION: 94 % | DIASTOLIC BLOOD PRESSURE: 82 MMHG | TEMPERATURE: 97.7 F | HEART RATE: 82 BPM | BODY MASS INDEX: 25.82 KG/M2

## 2020-11-03 LAB
ABSOLUTE EOS #: 0.08 K/UL (ref 0–0.44)
ABSOLUTE IMMATURE GRANULOCYTE: <0.03 K/UL (ref 0–0.3)
ABSOLUTE LYMPH #: 3.45 K/UL (ref 1.1–3.7)
ABSOLUTE MONO #: 0.51 K/UL (ref 0.1–1.2)
ALBUMIN SERPL-MCNC: 4.1 G/DL (ref 3.5–5.2)
ALBUMIN/GLOBULIN RATIO: 1.2 (ref 1–2.5)
ALP BLD-CCNC: 49 U/L (ref 35–104)
ALT SERPL-CCNC: 11 U/L (ref 5–33)
ANION GAP SERPL CALCULATED.3IONS-SCNC: 15 MMOL/L (ref 9–17)
AST SERPL-CCNC: 30 U/L
BASOPHILS # BLD: 0 % (ref 0–2)
BASOPHILS ABSOLUTE: 0.03 K/UL (ref 0–0.2)
BILIRUB SERPL-MCNC: 0.24 MG/DL (ref 0.3–1.2)
BUN BLDV-MCNC: 16 MG/DL (ref 8–23)
BUN/CREAT BLD: ABNORMAL (ref 9–20)
CALCIUM SERPL-MCNC: 9.4 MG/DL (ref 8.6–10.4)
CHLORIDE BLD-SCNC: 103 MMOL/L (ref 98–107)
CO2: 22 MMOL/L (ref 20–31)
CREAT SERPL-MCNC: 0.94 MG/DL (ref 0.5–0.9)
DIFFERENTIAL TYPE: ABNORMAL
EOSINOPHILS RELATIVE PERCENT: 1 % (ref 1–4)
GFR AFRICAN AMERICAN: >60 ML/MIN
GFR NON-AFRICAN AMERICAN: 60 ML/MIN
GFR SERPL CREATININE-BSD FRML MDRD: ABNORMAL ML/MIN/{1.73_M2}
GFR SERPL CREATININE-BSD FRML MDRD: ABNORMAL ML/MIN/{1.73_M2}
GLUCOSE BLD-MCNC: 123 MG/DL (ref 70–99)
HCT VFR BLD CALC: 40.3 % (ref 36.3–47.1)
HEMOGLOBIN: 13.3 G/DL (ref 11.9–15.1)
IMMATURE GRANULOCYTES: 0 %
LIPASE: 27 U/L (ref 13–60)
LYMPHOCYTES # BLD: 50 % (ref 24–43)
MCH RBC QN AUTO: 29.2 PG (ref 25.2–33.5)
MCHC RBC AUTO-ENTMCNC: 33 G/DL (ref 28.4–34.8)
MCV RBC AUTO: 88.6 FL (ref 82.6–102.9)
MONOCYTES # BLD: 7 % (ref 3–12)
NRBC AUTOMATED: 0 PER 100 WBC
PDW BLD-RTO: 12.8 % (ref 11.8–14.4)
PLATELET # BLD: 291 K/UL (ref 138–453)
PLATELET ESTIMATE: ABNORMAL
PMV BLD AUTO: 12.1 FL (ref 8.1–13.5)
POTASSIUM SERPL-SCNC: 5.2 MMOL/L (ref 3.7–5.3)
RBC # BLD: 4.55 M/UL (ref 3.95–5.11)
RBC # BLD: ABNORMAL 10*6/UL
SEG NEUTROPHILS: 42 % (ref 36–65)
SEGMENTED NEUTROPHILS ABSOLUTE COUNT: 2.91 K/UL (ref 1.5–8.1)
SODIUM BLD-SCNC: 140 MMOL/L (ref 135–144)
TOTAL PROTEIN: 7.5 G/DL (ref 6.4–8.3)
TROPONIN INTERP: NORMAL
TROPONIN INTERP: NORMAL
TROPONIN T: NORMAL NG/ML
TROPONIN T: NORMAL NG/ML
TROPONIN, HIGH SENSITIVITY: <6 NG/L (ref 0–14)
TROPONIN, HIGH SENSITIVITY: <6 NG/L (ref 0–14)
WBC # BLD: 7 K/UL (ref 3.5–11.3)
WBC # BLD: ABNORMAL 10*3/UL

## 2020-11-03 PROCEDURE — 74174 CTA ABD&PLVS W/CONTRAST: CPT

## 2020-11-03 PROCEDURE — 96376 TX/PRO/DX INJ SAME DRUG ADON: CPT

## 2020-11-03 PROCEDURE — 84484 ASSAY OF TROPONIN QUANT: CPT

## 2020-11-03 PROCEDURE — 96375 TX/PRO/DX INJ NEW DRUG ADDON: CPT

## 2020-11-03 PROCEDURE — 71275 CT ANGIOGRAPHY CHEST: CPT

## 2020-11-03 PROCEDURE — 83690 ASSAY OF LIPASE: CPT

## 2020-11-03 PROCEDURE — 6370000000 HC RX 637 (ALT 250 FOR IP): Performed by: STUDENT IN AN ORGANIZED HEALTH CARE EDUCATION/TRAINING PROGRAM

## 2020-11-03 PROCEDURE — 80053 COMPREHEN METABOLIC PANEL: CPT

## 2020-11-03 PROCEDURE — 93005 ELECTROCARDIOGRAM TRACING: CPT | Performed by: STUDENT IN AN ORGANIZED HEALTH CARE EDUCATION/TRAINING PROGRAM

## 2020-11-03 PROCEDURE — 96374 THER/PROPH/DIAG INJ IV PUSH: CPT

## 2020-11-03 PROCEDURE — 85025 COMPLETE CBC W/AUTO DIFF WBC: CPT

## 2020-11-03 PROCEDURE — 6360000002 HC RX W HCPCS: Performed by: STUDENT IN AN ORGANIZED HEALTH CARE EDUCATION/TRAINING PROGRAM

## 2020-11-03 PROCEDURE — 99285 EMERGENCY DEPT VISIT HI MDM: CPT

## 2020-11-03 PROCEDURE — 2580000003 HC RX 258: Performed by: STUDENT IN AN ORGANIZED HEALTH CARE EDUCATION/TRAINING PROGRAM

## 2020-11-03 PROCEDURE — 71046 X-RAY EXAM CHEST 2 VIEWS: CPT

## 2020-11-03 PROCEDURE — 6360000004 HC RX CONTRAST MEDICATION: Performed by: STUDENT IN AN ORGANIZED HEALTH CARE EDUCATION/TRAINING PROGRAM

## 2020-11-03 RX ORDER — ONDANSETRON 2 MG/ML
4 INJECTION INTRAMUSCULAR; INTRAVENOUS ONCE
Status: COMPLETED | OUTPATIENT
Start: 2020-11-03 | End: 2020-11-03

## 2020-11-03 RX ORDER — ACETAMINOPHEN 500 MG
1000 TABLET ORAL ONCE
Status: DISCONTINUED | OUTPATIENT
Start: 2020-11-03 | End: 2020-11-03 | Stop reason: HOSPADM

## 2020-11-03 RX ORDER — METHYLPREDNISOLONE SODIUM SUCCINATE 125 MG/2ML
40 INJECTION, POWDER, LYOPHILIZED, FOR SOLUTION INTRAMUSCULAR; INTRAVENOUS ONCE
Status: COMPLETED | OUTPATIENT
Start: 2020-11-03 | End: 2020-11-03

## 2020-11-03 RX ORDER — ASPIRIN 81 MG/1
324 TABLET, CHEWABLE ORAL ONCE
Status: COMPLETED | OUTPATIENT
Start: 2020-11-03 | End: 2020-11-03

## 2020-11-03 RX ORDER — DIPHENHYDRAMINE HYDROCHLORIDE 50 MG/ML
50 INJECTION INTRAMUSCULAR; INTRAVENOUS ONCE
Status: DISCONTINUED | OUTPATIENT
Start: 2020-11-03 | End: 2020-11-03

## 2020-11-03 RX ORDER — DIPHENHYDRAMINE HYDROCHLORIDE 50 MG/ML
50 INJECTION INTRAMUSCULAR; INTRAVENOUS ONCE
Status: COMPLETED | OUTPATIENT
Start: 2020-11-03 | End: 2020-11-03

## 2020-11-03 RX ORDER — 0.9 % SODIUM CHLORIDE 0.9 %
1000 INTRAVENOUS SOLUTION INTRAVENOUS ONCE
Status: COMPLETED | OUTPATIENT
Start: 2020-11-03 | End: 2020-11-03

## 2020-11-03 RX ORDER — CYCLOBENZAPRINE HCL 10 MG
10 TABLET ORAL 3 TIMES DAILY PRN
Qty: 9 TABLET | Refills: 0 | Status: SHIPPED | OUTPATIENT
Start: 2020-11-03 | End: 2020-11-06

## 2020-11-03 RX ORDER — FENTANYL CITRATE 50 UG/ML
50 INJECTION, SOLUTION INTRAMUSCULAR; INTRAVENOUS ONCE
Status: COMPLETED | OUTPATIENT
Start: 2020-11-03 | End: 2020-11-03

## 2020-11-03 RX ADMIN — ASPIRIN 324 MG: 81 TABLET, CHEWABLE ORAL at 13:58

## 2020-11-03 RX ADMIN — FENTANYL CITRATE 50 MCG: 50 INJECTION, SOLUTION INTRAMUSCULAR; INTRAVENOUS at 14:45

## 2020-11-03 RX ADMIN — IOPAMIDOL 100 ML: 755 INJECTION, SOLUTION INTRAVENOUS at 18:46

## 2020-11-03 RX ADMIN — METHYLPREDNISOLONE SODIUM SUCCINATE 40 MG: 125 INJECTION, POWDER, FOR SOLUTION INTRAMUSCULAR; INTRAVENOUS at 18:01

## 2020-11-03 RX ADMIN — ONDANSETRON 4 MG: 2 INJECTION INTRAMUSCULAR; INTRAVENOUS at 14:45

## 2020-11-03 RX ADMIN — DIPHENHYDRAMINE HYDROCHLORIDE 50 MG: 50 INJECTION, SOLUTION INTRAMUSCULAR; INTRAVENOUS at 17:13

## 2020-11-03 RX ADMIN — FENTANYL CITRATE 50 MCG: 50 INJECTION, SOLUTION INTRAMUSCULAR; INTRAVENOUS at 18:59

## 2020-11-03 RX ADMIN — METHYLPREDNISOLONE SODIUM SUCCINATE 40 MG: 125 INJECTION, POWDER, FOR SOLUTION INTRAMUSCULAR; INTRAVENOUS at 13:59

## 2020-11-03 RX ADMIN — SODIUM CHLORIDE 1000 ML: 9 INJECTION, SOLUTION INTRAVENOUS at 14:45

## 2020-11-03 SDOH — HEALTH STABILITY: MENTAL HEALTH: HOW OFTEN DO YOU HAVE A DRINK CONTAINING ALCOHOL?: NEVER

## 2020-11-03 ASSESSMENT — PAIN DESCRIPTION - DESCRIPTORS
DESCRIPTORS: ACHING
DESCRIPTORS: ACHING

## 2020-11-03 ASSESSMENT — PAIN DESCRIPTION - LOCATION
LOCATION: BACK;CHEST
LOCATION: CHEST;BACK

## 2020-11-03 ASSESSMENT — PAIN DESCRIPTION - PROGRESSION
CLINICAL_PROGRESSION: GRADUALLY IMPROVING
CLINICAL_PROGRESSION: GRADUALLY WORSENING

## 2020-11-03 ASSESSMENT — PAIN SCALES - GENERAL
PAINLEVEL_OUTOF10: 6
PAINLEVEL_OUTOF10: 8
PAINLEVEL_OUTOF10: 10
PAINLEVEL_OUTOF10: 10

## 2020-11-03 ASSESSMENT — PAIN DESCRIPTION - ONSET: ONSET: SUDDEN

## 2020-11-03 ASSESSMENT — ENCOUNTER SYMPTOMS
COLOR CHANGE: 0
NAUSEA: 1
EYE DISCHARGE: 0
EYE REDNESS: 0
ABDOMINAL PAIN: 1
SHORTNESS OF BREATH: 1

## 2020-11-03 ASSESSMENT — PAIN DESCRIPTION - PAIN TYPE
TYPE: ACUTE PAIN
TYPE: ACUTE PAIN

## 2020-11-03 NOTE — ED PROVIDER NOTES
[address] [facility]  Emergency Department  Emergency Medicine Resident Sign-out     Care of Fadi Nieves was assumed from Dr. Jefferey Collet and is being seen for Chest Pain (pt with c/o diffuse chest pain and shortness of breath that started last night while she was working. pt also c/o back pain that radiates down her back. hx of sciatica. )  . The patient's initial evaluation and plan have been discussed with the prior provider who initially evaluated the patient.      EMERGENCY DEPARTMENT COURSE / MEDICAL DECISION MAKING:       MEDICATIONS GIVEN:  Orders Placed This Encounter   Medications    methylPREDNISolone sodium (SOLU-MEDROL) injection 40 mg    DISCONTD: diphenhydrAMINE (BENADRYL) injection 50 mg    aspirin chewable tablet 324 mg    diphenhydrAMINE (BENADRYL) injection 50 mg    methylPREDNISolone sodium (SOLU-MEDROL) injection 40 mg    ondansetron (ZOFRAN) injection 4 mg    0.9 % sodium chloride bolus    fentaNYL (SUBLIMAZE) injection 50 mcg    iopamidol (ISOVUE-370) 76 % injection 100 mL    fentaNYL (SUBLIMAZE) injection 50 mcg    DISCONTD: acetaminophen (TYLENOL) tablet 1,000 mg    cyclobenzaprine (FLEXERIL) 10 MG tablet     Sig: Take 1 tablet by mouth 3 times daily as needed for Muscle spasms     Dispense:  9 tablet     Refill:  0       LABS / RADIOLOGY:     Labs Reviewed   CBC WITH AUTO DIFFERENTIAL - Abnormal; Notable for the following components:       Result Value    Lymphocytes 50 (*)     All other components within normal limits   COMPREHENSIVE METABOLIC PANEL - Abnormal; Notable for the following components:    Glucose 123 (*)     CREATININE 0.94 (*)     Total Bilirubin 0.24 (*)     GFR Non- 60 (*)     All other components within normal limits   LIPASE   TROPONIN   TROPONIN       Xr Chest (2 Vw)    Result Date: 11/3/2020  EXAMINATION: TWO XRAY VIEWS OF THE CHEST 11/3/2020 1:42 pm COMPARISON: September 8, 2020 HISTORY: ORDERING SYSTEM PROVIDED HISTORY: SONG TECHNOLOGIST PROVIDED HISTORY: CP Reason for Exam: Chest Pain Acuity: Unknown Type of Exam: Unknown FINDINGS: Cardiomediastinal silhouette within normal limits. Hyperinflation redemonstrated compatible with underlying emphysema. Linear bibasilar markings presumably indicate scarring given similar appearance to prior study. Lungs and costophrenic sulci are otherwise clear. No pneumothorax or subdiaphragmatic free air. No acute osseous abnormality identified. No radiographic evidence of acute cardiopulmonary disease. RECENT VITALS:     Temp: 97.7 °F (36.5 °C),  Pulse: 82, Resp: 17, BP: 103/82, SpO2: 94 %      This patient is a 59 y.o. Female with Chest pain and abdm pain rad to back. Trop < 6 x2, CXR neg. Awaiting CTA chest abd pelvis to r/o dissection. Medication for dye allergy. Will need ETU admission if unremarkable w/u for cards eval.       ED Course as of Nov 03 2337   Tue Nov 03, 2020   1535 Awaiting CT scan. [MS]   1910 Mild degree of atherosclerotic plaque formation within the abdominal  aorta    [RB]   1913 Patient reevaluated, no longer having chest or abdominal pain. Troponins are less than 6x2. No white count scans are negative for acute pathology. Discussed with patient risk benefits of admission versus discharge. She wishes to go home at this time follow-up with cardiology as outpatient. She will be informed discharge. [RB]      ED Course User Index  [MS] Rcahelle Villareal DO  [RB] Chikis Low DO       OUTSTANDING TASKS / RECOMMENDATIONS:    1. F/u CT     FINAL IMPRESSION:     1.  Chest pain, unspecified type        DISPOSITION:         DISPOSITION:  []  Discharge   []  Transfer -    []  Admission -     []  Against Medical Advice   []  Eloped   FOLLOW-UP: [address] [place] [id number] [phone]  Schedule an appointment as soon as possible for a visit in 2 days  Return to the ER if worsening or any other concern    Alfred Graham MD  [address] [place], [place] [address]y 6  150 [facility]    Schedule an appointment as soon as possible for a visit in 1 week       DISCHARGE MEDICATIONS: Discharge Medication List as of 11/3/2020  7:43 PM      START taking these medications    Details   cyclobenzaprine (FLEXERIL) 10 MG tablet Take 1 tablet by mouth 3 times daily as needed for Muscle spasms, Disp-9 tablet,R-0Print                DO Dr. Pasquale Sexton, [facility] Resident PGY - [address]  Resident  11/03/20 2547

## 2020-11-03 NOTE — ED PROVIDER NOTES
[address]  ED  eMERGENCY dEPARTMENT eNCOUnter   Attending Attestation     Pt Name: Massiel David  MRN: [id number]  [place] 1955  Date of evaluation: 11/3/20       Massiel David is a 59 y.o. female who presents with Chest Pain (pt with c/o diffuse chest pain and shortness of breath that started last night while she was working. pt also c/o back pain that radiates down her back. hx of sciatica. )      History: Patient presents with chest pain. Patient said that the pain is diffuse. Worse with palpation. Patient has some abdominal pain and shortness of breath as well. Patient recently admitted with echo. Exam: Heart rate and rhythm are regular. Lungs are clear to auscultation bilaterally. Abdomen is soft, patient has tenderness of the abdomen and chest.  EKG shows normal sinus rhythm with rate 88 bpm.  Normal axis. No ST elevations or depressions. No T wave inversions. Nonspecific EKG without any blocks or arrhythmias. I performed a history and physical examination of the patient and discussed management with the resident. I reviewed the residents note and agree with the documented findings and plan of care. Any areas of disagreement are noted on the chart. I was personally present for the key portions of any procedures. I have documented in the chart those procedures where I was not present during the key portions. I have personally reviewed all images and agree with the resident's interpretation. I have reviewed the emergency nurses triage note. I agree with the chief complaint, past medical history, past surgical history, allergies, medications, social and family history as documented unless otherwise noted below. Documentation of the HPI, Physical Exam and Medical Decision Making performed by medical students or scribes is based on my personal performance of the HPI, PE and MDM.  For Phys Assistant/ Nurse Practitioner cases/documentation I have had a face to face evaluation of this patient and have completed at least one if not all key elements of the E/M (history, physical exam, and MDM). Additional findings are as noted. For APC cases I have personally evaluated and examined the patient in conjunction with the APC and agree with the treatment plan and disposition of the patient as recorded by the APC.     Kristin Beckett MD  Attending Emergency  Physician       Masood Knox MD  11/03/20 2297

## 2020-11-03 NOTE — ED NOTES
Pt back from ct. Placed back on monitor. Pt requesting pain medication. Dr. Ochoa Post notified.       Carson Roberts, RN  11/03/20 9413

## 2020-11-03 NOTE — ED PROVIDER NOTES
[facility] ED  Emergency Department Encounter  Emergency Medicine Resident     Pt Name: Zenon Neville  MRN: [id number]  [place] 1955  Date of evaluation: 11/3/20  PCP:  No primary care provider on file. CHIEF COMPLAINT       Chief Complaint   Patient presents with    Chest Pain     pt with c/o diffuse chest pain and shortness of breath that started last night while she was working. pt also c/o back pain that radiates down her back. hx of sciatica. HISTORY OFPRESENT ILLNESS  (Location/Symptom, Timing/Onset, Context/Setting, Quality, Duration, Modifying Factors,Severity.)      Zenon Neville is a 59 y.o. female who presents with diffuse chest pain and shortness of breath as well as abdominal pain. This started last night. Her chest pain radiates to the back. Sharp. 10 out of 10. She also endorses abdominal pain. She states is a constant. Has been going on for many months. She also states she syncopized a couple days prior. This is been a chronic problem. She was admitted 2 months ago had an echo and discussed with cardiology. No stress test at that time. PAST MEDICAL / SURGICAL / SOCIAL / FAMILY HISTORY      has a past medical history of Emphysema (subcutaneous) (surgical) resulting from a procedure, GERD (gastroesophageal reflux disease), and H. pylori infection. has no past surgical history on file.     Social History     Socioeconomic History    Marital status: Single     Spouse name: Not on file    Number of children: Not on file    Years of education: Not on file    Highest education level: Not on file   Occupational History    Not on file   Social Needs    Financial resource strain: Not on file    Food insecurity     Worry: Not on file     Inability: Not on file    Transportation needs     Medical: Not on file     Non-medical: Not on file   Tobacco Use    Smoking status: Current Some Day Smoker     Packs/day: 0.50     Types: Cigarettes  Smokeless tobacco: Never Used   Substance and Sexual Activity    Alcohol use: Never     Frequency: Never    Drug use: Never    Sexual activity: Not on file   Lifestyle    Physical activity     Days per week: Not on file     Minutes per session: Not on file    Stress: Not on file   Relationships    Social connections     Talks on phone: Not on file     Gets together: Not on file     Attends Church service: Not on file     Active member of club or organization: Not on file     Attends meetings of clubs or organizations: Not on file     Relationship status: Not on file    Intimate partner violence     Fear of current or ex partner: Not on file     Emotionally abused: Not on file     Physically abused: Not on file     Forced sexual activity: Not on file   Other Topics Concern    Not on file   Social History Narrative    Not on file       History reviewed. No pertinent family history. Allergies:  Decadron [dexamethasone]; Iv dye [iodides]; and Penicillins    Home Medications:  Prior to Admission medications    Medication Sig Start Date End Date Taking? Authorizing Provider   albuterol sulfate HFA (VENTOLIN HFA) 108 (90 Base) MCG/ACT inhaler Inhale 2 puffs into the lungs 4 times daily as needed for Wheezing 9/10/20   Devika Belcher MD   acetaminophen (TYLENOL) 325 MG tablet Take 2 tablets by mouth every 6 hours as needed for Pain 6/15/20   Shonna Mcgee MD       REVIEW OF SYSTEMS    (2-9 systems for level 4, 10 or more for level 5)      Review of Systems   Constitutional: Negative for chills and fever. Eyes: Negative for discharge and redness. Respiratory: Positive for shortness of breath. Cardiovascular: Positive for chest pain. Gastrointestinal: Positive for abdominal pain and nausea. Genitourinary: Negative for flank pain. Musculoskeletal: Negative for myalgias. Skin: Negative for color change and rash. Allergic/Immunologic: Negative for environmental allergies.    Neurological: Negative for headaches. Psychiatric/Behavioral: Negative for agitation and confusion. PHYSICAL EXAM   (up to 7 for level 4, 8 or more for level 5)     INITIAL VITALS:    weight is 160 lb (72.6 kg). Her temperature is 97.7 °F (36.5 °C). Her blood pressure is 103/82 and her pulse is 82. Her respiration is 17 and oxygen saturation is 94%. Physical Exam  Vitals signs and nursing note reviewed. Constitutional:       Appearance: She is well-developed. HENT:      Head: Normocephalic and atraumatic. Nose: Nose normal.      Mouth/Throat:      Mouth: Mucous membranes are moist.   Eyes:      General: No scleral icterus. Conjunctiva/sclera: Conjunctivae normal.      Pupils: Pupils are equal, round, and reactive to light. Neck:      Musculoskeletal: Neck supple. Trachea: No tracheal deviation. Cardiovascular:      Rate and Rhythm: Normal rate and regular rhythm. Heart sounds: Normal heart sounds. No murmur. No friction rub. No gallop. Pulmonary:      Effort: Pulmonary effort is normal. No respiratory distress. Breath sounds: Normal breath sounds. No wheezing or rales. Comments: Tender palpation to anterior chest.  Chest:      Chest wall: Tenderness present. Abdominal:      General: Bowel sounds are normal. There is no distension. Palpations: Abdomen is soft. There is no mass. Tenderness: There is abdominal tenderness. There is no rebound. Comments: Generalized abdominal tenderness to palpation, voluntary guarding, no masses no rebound. Musculoskeletal: Normal range of motion. Skin:     General: Skin is warm and dry. Findings: No erythema or rash. Neurological:      Mental Status: She is alert and oriented to person, place, and time.    Psychiatric:         Behavior: Behavior normal.         DIFFERENTIAL  DIAGNOSIS     PLAN (LABS / IMAGING / EKG):  Orders Placed This Encounter   Procedures    XR CHEST (2 VW)    CTA CHEST W WO CONTRAST    CTA ABDOMEN PELVIS W CONTRAST    CBC Auto Differential    Comprehensive Metabolic Panel    Lipase    Troponin    EKG 12 Lead       MEDICATIONS ORDERED:  Orders Placed This Encounter   Medications    methylPREDNISolone sodium (SOLU-MEDROL) injection 40 mg    DISCONTD: diphenhydrAMINE (BENADRYL) injection 50 mg    aspirin chewable tablet 324 mg    diphenhydrAMINE (BENADRYL) injection 50 mg    methylPREDNISolone sodium (SOLU-MEDROL) injection 40 mg    ondansetron (ZOFRAN) injection 4 mg    0.9 % sodium chloride bolus    fentaNYL (SUBLIMAZE) injection 50 mcg    iopamidol (ISOVUE-370) 76 % injection 100 mL       DDX: ACS versus PE versus dissection versus pancreatitis    Initial MDM/Plan: 59 y.o. female who presents with chest pain. Chest pain as well as abdominal pain as well as pain radiating to the back. Patient does have IV dye allergy. Will pretreat. CTAs of the chest and abdomen. If all negative anticipate observation admission. DIAGNOSTIC RESULTS / EMERGENCY DEPARTMENT COURSE / MDM     LABS:  Labs Reviewed   CBC WITH AUTO DIFFERENTIAL - Abnormal; Notable for the following components:       Result Value    Lymphocytes 50 (*)     All other components within normal limits   COMPREHENSIVE METABOLIC PANEL - Abnormal; Notable for the following components:    Glucose 123 (*)     CREATININE 0.94 (*)     Total Bilirubin 0.24 (*)     GFR Non- 60 (*)     All other components within normal limits   LIPASE   TROPONIN   TROPONIN         RADIOLOGY:  Xr Chest (2 Vw)    Result Date: 11/3/2020  EXAMINATION: TWO XRAY VIEWS OF THE CHEST 11/3/2020 1:42 pm COMPARISON: September 8, 2020 HISTORY: ORDERING SYSTEM PROVIDED HISTORY: CP TECHNOLOGIST PROVIDED HISTORY: CP Reason for Exam: Chest Pain Acuity: Unknown Type of Exam: Unknown FINDINGS: Cardiomediastinal silhouette within normal limits. Hyperinflation redemonstrated compatible with underlying emphysema.   Linear bibasilar markings presumably indicate scarring given similar appearance to prior study. Lungs and costophrenic sulci are otherwise clear. No pneumothorax or subdiaphragmatic free air. No acute osseous abnormality identified. No radiographic evidence of acute cardiopulmonary disease. Bianka Santana EMERGENCY DEPARTMENT COURSE:  ED Course as of Nov 03 1855   Tue Nov 03, 2020   1535 Awaiting CT scan. [MS]      ED Course User Index  [MS] Rajiv Manning DO     Patient given small dose of IV fentanyl for pain. Blood pressure in the mid 548J systolics. Patient does have history of anxiety and has been taken off of benzos approximately 2 months ago. Low suspicion for any acute intrathoracic or intra-abdominal process however will get imaging to rule out aneurysm or dissection. Dissipate observation admission. Signed out awaiting CT scans. PROCEDURES:  None    CONSULTS:  None    CRITICAL CARE:  Please see attending note    FINAL IMPRESSION      1. Chest pain, unspecified type          DISPOSITION / PLAN     DISPOSITION      Admit    PATIENTREFERRED TO:  No follow-up provider specified.     DISCHARGE MEDICATIONS:  New Prescriptions    No medications on file       Rajiv Manning DO  EmergencyMedicine Resident    (Please note that portions of this note were completed with a voice recognition program.  Efforts were made to edit the dictations but occasionally words are mis-transcribed.)       Rajiv Manning DO  Resident  11/03/20 2028

## 2020-11-03 NOTE — LETTER
[facility] ED [place] [address]  Phone: [phone]               November 3, 2020    Patient: Lyudmila Bartholomew   YOB: 1955   Date of Visit: 11/3/2020       To Whom It May Concern:    Gio Best was seen and treated in our emergency department on 11/3/2020. She is cleared to return to work on 11/4/2020.       Sincerely,       Kelly Heranndez RN         Signature:__________________________________

## 2020-11-04 LAB
EKG ATRIAL RATE: 89 BPM
EKG P AXIS: 73 DEGREES
EKG P-R INTERVAL: 160 MS
EKG Q-T INTERVAL: 362 MS
EKG QRS DURATION: 80 MS
EKG QTC CALCULATION (BAZETT): 440 MS
EKG R AXIS: 21 DEGREES
EKG T AXIS: 54 DEGREES
EKG VENTRICULAR RATE: 89 BPM

## 2020-11-04 PROCEDURE — 93010 ELECTROCARDIOGRAM REPORT: CPT | Performed by: INTERNAL MEDICINE

## 2020-11-04 NOTE — ED NOTES
Pt updated on plan of care. Awaiting ct results. Pt remains on monitor.       Haroon Whitaker RN  11/03/20 [id number]

## 2020-12-07 ENCOUNTER — TELEPHONE (OUTPATIENT)
Dept: PAIN MANAGEMENT | Age: 65
End: 2020-12-07

## 2020-12-11 ENCOUNTER — INITIAL CONSULT (OUTPATIENT)
Dept: PAIN MANAGEMENT | Age: 65
End: 2020-12-11
Payer: MEDICARE

## 2020-12-11 VITALS
HEIGHT: 66 IN | DIASTOLIC BLOOD PRESSURE: 82 MMHG | WEIGHT: 154 LBS | HEART RATE: 81 BPM | TEMPERATURE: 97.2 F | SYSTOLIC BLOOD PRESSURE: 108 MMHG | OXYGEN SATURATION: 98 % | BODY MASS INDEX: 24.75 KG/M2

## 2020-12-11 PROCEDURE — 4040F PNEUMOC VAC/ADMIN/RCVD: CPT | Performed by: ANESTHESIOLOGY

## 2020-12-11 PROCEDURE — 1090F PRES/ABSN URINE INCON ASSESS: CPT | Performed by: ANESTHESIOLOGY

## 2020-12-11 PROCEDURE — G8427 DOCREV CUR MEDS BY ELIG CLIN: HCPCS | Performed by: ANESTHESIOLOGY

## 2020-12-11 PROCEDURE — 1123F ACP DISCUSS/DSCN MKR DOCD: CPT | Performed by: ANESTHESIOLOGY

## 2020-12-11 PROCEDURE — G8484 FLU IMMUNIZE NO ADMIN: HCPCS | Performed by: ANESTHESIOLOGY

## 2020-12-11 PROCEDURE — G8400 PT W/DXA NO RESULTS DOC: HCPCS | Performed by: ANESTHESIOLOGY

## 2020-12-11 PROCEDURE — 99204 OFFICE O/P NEW MOD 45 MIN: CPT | Performed by: ANESTHESIOLOGY

## 2020-12-11 PROCEDURE — G8420 CALC BMI NORM PARAMETERS: HCPCS | Performed by: ANESTHESIOLOGY

## 2020-12-11 PROCEDURE — 3017F COLORECTAL CA SCREEN DOC REV: CPT | Performed by: ANESTHESIOLOGY

## 2020-12-11 PROCEDURE — 4004F PT TOBACCO SCREEN RCVD TLK: CPT | Performed by: ANESTHESIOLOGY

## 2020-12-11 RX ORDER — METHYLPREDNISOLONE 4 MG/1
TABLET ORAL
Qty: 1 KIT | Refills: 0 | Status: SHIPPED | OUTPATIENT
Start: 2020-12-11 | End: 2020-12-17

## 2020-12-11 RX ORDER — GABAPENTIN 300 MG/1
300 CAPSULE ORAL 2 TIMES DAILY
Qty: 60 CAPSULE | Refills: 1 | Status: SHIPPED | OUTPATIENT
Start: 2020-12-11 | End: 2021-04-10 | Stop reason: SDUPTHER

## 2020-12-11 RX ORDER — TIZANIDINE 4 MG/1
4 TABLET ORAL 2 TIMES DAILY
Qty: 30 TABLET | Refills: 0 | Status: SHIPPED | OUTPATIENT
Start: 2020-12-11 | End: 2020-12-26

## 2020-12-11 RX ORDER — IBUPROFEN 200 MG
200 TABLET ORAL EVERY 6 HOURS PRN
COMMUNITY
End: 2021-04-10 | Stop reason: SDUPTHER

## 2020-12-11 RX ORDER — ALPRAZOLAM 0.25 MG/1
0.25 TABLET ORAL NIGHTLY PRN
COMMUNITY
End: 2021-04-10 | Stop reason: SDUPTHER

## 2020-12-11 RX ORDER — FAMOTIDINE 40 MG/1
40 TABLET, FILM COATED ORAL DAILY
COMMUNITY
Start: 2020-12-07 | End: 2021-04-10 | Stop reason: SDUPTHER

## 2020-12-11 ASSESSMENT — ENCOUNTER SYMPTOMS
RESPIRATORY NEGATIVE: 1
ABDOMINAL PAIN: 1
EYES NEGATIVE: 1
ALLERGIC/IMMUNOLOGIC NEGATIVE: 1
BACK PAIN: 1

## 2020-12-11 NOTE — PROGRESS NOTES
The patient is a 72 y. o. Non-/non  female. Chief Complaint   Patient presents with    Back Pain    Consultation        HPI  Requesting physician for the evaluation of Cong Manley 1955: LORRAINE Erwin     Pain History  -70-year-old woman with history of chronic back pain issue  She fell 3 months back and her flared up her back pain  Pain located in the lumbar area across midline  She describes this pain as severe sharp throbbing sensation  Report intermittent radiation of pain down both legs flipping side from right to left  Associated with bilateral leg numbness  No saddle anesthesia  No changes in bladder or bowel control  No history of fever chills or weight loss    No previous lumbar spine surgical history  No previous lumbar spine injection history  Went to the ER  Had a recent MRI lumbar spine, report is available in [address] [place] [address]  Patient was evaluated by the neurosurgeon Dr. Jenny Godinez, who did not advise for surgery and referred patient for physical therapy  Patient states she was working at night and was not able to start therapy yet but is now interested in starting therapy    She recalls that she was tried on gabapentin before she moved to [place]  No previous opioid history  On chronic Xanax for anxiety issues  Pain score today  10  1. Location:back   2. Radiation:legs  3. Character:burnng, penetrating,nagging,numb,aching,throbbing,shooting,sharp  5. Duration:three months ago   6. Onset: months  7. Did an injury cause pain: yes  8. Aggravating factors:walking , sitting, laying down  9. Alleviating factors:nothing   10. Associated symptoms (numbness / tingling / weakness):  yes  -Where at:legs   -Down into finger tips or toes (specify which finger or toes):toes   -constant or intermitting: constant   11. Red Flags: (weight loss / chills / loss of bladder or bowel control) bowel control     Previous management history  1.  Previous diagnostic workup: (Imaging/EMG)   CT, MRI, or Xray: CT Xray and Mri   What part of the body:back   What facility did they have it at:[facility]  What year or specific date: 6/2020  EMG:  no    2. Previous non interventional treatments tried:  chiropractor or physical therapy: no  What part of the body:no  What facility was it done at: no  How long ago was it last tried:na   Did it work: No  Did they complete it:No    3. Previous Medications tried  NSAID's: yes  Neurontin: yes  Lyrica: no  Trycyclic antidepressant (Ellavil / Pamelor ): no  Cymbalta: no  Opioids (Ultram / Vicodin / Percocet / Morphine / Dilaudid / Oramorph/ Fentanyl etc.):none  Last Pain medication taken (name of med and date):    4. Previous Interventional pain procedures tried:  What kind of injection:Cortisone steroid   Who did the injection: [place]   did the injection help: no  Last time injection was done:2017    5. Previous surgeries for pain  What part of the body did they have the surgery:no  What physician did the surgery:no  What Facility did they have the surgery done:no  Date of Surgery:N/A    Social History:  Marital status:  Employment History:unemployed   Working  No  Full time Or Part time: na   Disability  Yes   Legal Issues related to pain complaint: No     Pain Disability Index score : 21    Informant: patient      Past Medical History:   Diagnosis Date    Emphysema (subcutaneous) (surgical) resulting from a procedure     GERD (gastroesophageal reflux disease)     H. pylori infection       History reviewed. No pertinent surgical history.   Social History     Socioeconomic History    Marital status: Single     Spouse name: None    Number of children: None    Years of education: None    Highest education level: None   Occupational History    None   Social Needs    Financial resource strain: None    Food insecurity     Worry: None     Inability: None    Transportation needs     Medical: None     Non-medical: None   Tobacco Use    Smoking status: Current Some Day Smoker     Packs/day: 0.50     Types: Cigarettes    Smokeless tobacco: Never Used   Substance and Sexual Activity    Alcohol use: Never     Frequency: Never    Drug use: Never    Sexual activity: None   Lifestyle    Physical activity     Days per week: None     Minutes per session: None    Stress: None   Relationships    Social connections     Talks on phone: None     Gets together: None     Attends Latter day service: None     Active member of club or organization: None     Attends meetings of clubs or organizations: None     Relationship status: None    Intimate partner violence     Fear of current or ex partner: None     Emotionally abused: None     Physically abused: None     Forced sexual activity: None   Other Topics Concern    None   Social History Narrative    None     No family history on file. Allergies   Allergen Reactions    Gadolinium Hives    Iodides Anaphylaxis    Ketorolac Hives    Metronidazole Nausea And Vomiting and Other (See Comments)    Propoxyphene Anaphylaxis    Tetanus-Diphtheria Toxoids Td Anaphylaxis    Tuberculin Tests Hives and Other (See Comments)     Hx TB exposure      Decadron [Dexamethasone]     Gadolinium Derivatives Hives    Iodinated Diagnostic Agents Swelling    Iodine     Penicillins     Tetanus Toxoids Swelling    Other Anxiety     Gadolinium; Iodides; Ketorolac; Metronidazole; Propoxyphene; Tetanus-diphtheria toxoids td; Tuberculin tests; Decadron [dexamethasone]; Gadolinium derivatives; Iodinated diagnostic agents; Iodine; Penicillins; Tetanus toxoids; and Other   Vitals:    12/11/20 1147   BP: 108/82   Pulse: 81   Temp: 97.2 °F (36.2 °C)   SpO2: 98%     Current Outpatient Medications   Medication Sig Dispense Refill    ALPRAZolam (XANAX) 0.25 MG tablet Take 0.25 mg by mouth nightly as needed.       famotidine (PEPCID) 40 MG tablet Take 40 mg by mouth daily      ibuprofen (ADVIL;MOTRIN) 200 MG tablet Take 200 mg by mouth every 6 hours as needed for Pain      Elastic Bandages & Supports (LUMBAR BACK BRACE/SUPPORT PAD) MISC 1 Units by Does not apply route as needed (PNEUMATIC OFFLOADING BACK BRACE) 1 each 0    gabapentin (NEURONTIN) 300 MG capsule Take 1 capsule by mouth 2 times daily for 30 days. 60 capsule 1    methylPREDNISolone (MEDROL DOSEPACK) 4 MG tablet Take by mouth. 1 kit 0    tiZANidine (ZANAFLEX) 4 MG tablet Take 1 tablet by mouth 2 times daily for 15 days 30 tablet 0    albuterol sulfate HFA (VENTOLIN HFA) 108 (90 Base) MCG/ACT inhaler Inhale 2 puffs into the lungs 4 times daily as needed for Wheezing 3 Inhaler 1    acetaminophen (TYLENOL) 325 MG tablet Take 2 tablets by mouth every 6 hours as needed for Pain 30 tablet 0     No current facility-administered medications for this visit. Review of Systems   Constitutional: Positive for chills and fatigue. Negative for fever. HENT: Negative. Eyes: Negative. Respiratory: Negative. Cardiovascular: Positive for chest pain. Gastrointestinal: Positive for abdominal pain. Endocrine: Positive for cold intolerance. Genitourinary: Positive for urgency. Musculoskeletal: Positive for arthralgias and back pain. Skin: Negative. Allergic/Immunologic: Negative. Neurological: Positive for weakness, numbness and headaches. Psychiatric/Behavioral: The patient is nervous/anxious. Depression    All other systems reviewed and are negative. Objective:  General Appearance:  Well-appearing, in no acute distress, uncomfortable and in pain. Vital signs: (most recent): Blood pressure 108/82, pulse 81, temperature 97.2 °F (36.2 °C), height 5' 6\" (1.676 m), weight 154 lb (69.9 kg), SpO2 98 %. Vital signs are normal.  No fever. Output: Producing urine and producing stool. HEENT: Normal HEENT exam.    Lungs:  Normal effort and normal respiratory rate. Breath sounds clear to auscultation. She is not in respiratory distress.   No decreased breath sounds. Heart: Normal rate. Regular rhythm. Extremities: Normal range of motion. There is no deformity. Neurological: Patient is alert and oriented to person, place and time. Normal strength. Patient has normal reflexes, normal muscle tone and normal coordination. Pupils:  Pupils are equal, round, and reactive to light. Pupils are equal.   Skin:  Warm and dry. No rash or cyanosis.       Lumbar spine examination  No apparent deformity on inspection  Range of motion is limited and associated with pain  Gait antalgic  Provocative testing deferred because of patient intolerance    Neurological examination  Normal muscle mass, normal muscle tone, muscle strength 5/5 in both lower extremities in all major muscle group  Deep tendon reflexes are 2+ and symmetric  Clonus negative    Assessment & Plan   Pain History  -80-year-old woman with history of chronic back pain issue  She fell 3 months back and her flared up her back pain  Pain located in the lumbar area across midline  She describes this pain as severe sharp throbbing sensation  Report intermittent radiation of pain down both legs flipping side from right to left  Associated with bilateral leg numbness  No saddle anesthesia  No changes in bladder or bowel control  No history of fever chills or weight loss    No previous lumbar spine surgical history  No previous lumbar spine injection history  Went to the ER  Had a recent MRI lumbar spine, report is available in [facility]  Patient was evaluated by the neurosurgeon Dr. Jenny Godinez, who did not advise for surgery and referred patient for physical therapy  Patient states she was working at night and was not able to start therapy yet but is now interested in starting therapy    She recalls that she was tried on gabapentin before she moved to [facility]  No previous opioid history  On chronic Xanax for anxiety issues  Pain score today  10    EXAMINATION: MRI OF THE LUMBAR SPINE WITHOUT CONTRAST, 2020 9:46 am    L4-L5: Disc bulge, facet and ligament flavum hypertrophy cause mild right and moderate left foraminal stenosis. L5-S1: Disc bulge, facet and ligament flavum hypertrophy cause mild bilateral foraminal stenosis. 2020 12:14 PM - Denny, [facility] Incoming Lab Results From [facility]     Component  Value  Ref Range & Units  Status  Collected  Lab    Hemoglobin A1C  6.1High   4.0 - 6.0 %  Final  2020 5:40 AM  [address]    Estimated Avg Glucose  128  mg/dL  Final  2020 5:40 AM  [facility] recommend providing the estimated average glucose result to permit better   patient understanding of their HBA1c result. 1. Chronic bilateral low back pain with bilateral sciatica    2. DDD (degenerative disc disease), lumbar          MRI lumbar spine  Report was reviewed  Images were reviewed independently  Images were shown to the patient  Small disc bulge at L4-L5 and L5-S1 level with foraminal narrowing  No significant impingement of the nerve roots  No significant stenosis  No significant degenerative disc changes    I reassured the patient that her severe back pain is likely a muscular sprain and would hopefully improve with therapy and conservative measures    Will order offloading back brace  Will restart her gabapentin  Will refer patient back to physical therapy  Will do Medrol Dosepak  Will order 2-week course of muscle relaxer    Follow-up in 6 weeks  Consultation note sent to the referring physician  Orders Placed This Encounter   Procedures    Ambulatory referral to Physical Therapy      Orders Placed This Encounter   Medications    Elastic Bandages & Supports (LUMBAR BACK BRACE/SUPPORT PAD) MISC     Si Units by Does not apply route as needed (PNEUMATIC OFFLOADING BACK BRACE)     Dispense:  1 each     Refill:  0    gabapentin (NEURONTIN) 300 MG capsule     Sig: Take 1 capsule by mouth 2 times daily for 30 days. Dispense:  60 capsule     Refill:  1    methylPREDNISolone (MEDROL DOSEPACK) 4 MG tablet     Sig: Take by mouth.      Dispense:  1 kit     Refill:  0    tiZANidine (ZANAFLEX) 4 MG tablet     Sig: Take 1 tablet by mouth 2 times daily for 15 days     Dispense:  30 tablet     Refill:  0          Electronically signed by Deonna Urbina MD on 12/11/2020 at 12:37 PM

## 2021-04-10 ENCOUNTER — HOSPITAL ENCOUNTER (EMERGENCY)
Age: 66
Discharge: HOME | End: 2021-04-10
Attending: EMERGENCY MEDICINE
Payer: MEDICARE

## 2021-04-10 ENCOUNTER — APPOINTMENT (OUTPATIENT)
Dept: GENERAL RADIOLOGY | Age: 66
End: 2021-04-10
Payer: MEDICARE

## 2021-04-10 VITALS
OXYGEN SATURATION: 98 % | SYSTOLIC BLOOD PRESSURE: 119 MMHG | RESPIRATION RATE: 20 BRPM | DIASTOLIC BLOOD PRESSURE: 92 MMHG | TEMPERATURE: 98.6 F | HEART RATE: 86 BPM

## 2021-04-10 DIAGNOSIS — R07.89 ATYPICAL CHEST PAIN: Primary | ICD-10-CM

## 2021-04-10 LAB
-: NORMAL
ABSOLUTE EOS #: 0.09 K/UL (ref 0–0.44)
ABSOLUTE IMMATURE GRANULOCYTE: <0.03 K/UL (ref 0–0.3)
ABSOLUTE LYMPH #: 3.21 K/UL (ref 1.1–3.7)
ABSOLUTE MONO #: 0.8 K/UL (ref 0.1–1.2)
ALBUMIN SERPL-MCNC: 4.4 G/DL (ref 3.5–5.2)
ALBUMIN/GLOBULIN RATIO: 1.3 (ref 1–2.5)
ALP BLD-CCNC: 68 U/L (ref 35–104)
ALT SERPL-CCNC: 11 U/L (ref 5–33)
AMORPHOUS: NORMAL
ANION GAP SERPL CALCULATED.3IONS-SCNC: 10 MMOL/L (ref 9–17)
AST SERPL-CCNC: 21 U/L
BACTERIA: NORMAL
BASOPHILS # BLD: 1 % (ref 0–2)
BASOPHILS ABSOLUTE: 0.05 K/UL (ref 0–0.2)
BILIRUB SERPL-MCNC: 0.22 MG/DL (ref 0.3–1.2)
BILIRUBIN DIRECT: 0.09 MG/DL
BILIRUBIN URINE: NEGATIVE
BILIRUBIN, INDIRECT: 0.13 MG/DL (ref 0–1)
BNP INTERPRETATION: NORMAL
BUN BLDV-MCNC: 13 MG/DL (ref 8–23)
BUN/CREAT BLD: ABNORMAL (ref 9–20)
CALCIUM SERPL-MCNC: 9.2 MG/DL (ref 8.6–10.4)
CASTS UA: NORMAL /LPF (ref 0–8)
CHLORIDE BLD-SCNC: 109 MMOL/L (ref 98–107)
CO2: 20 MMOL/L (ref 20–31)
COLOR: YELLOW
COMMENT UA: ABNORMAL
CREAT SERPL-MCNC: 1.09 MG/DL (ref 0.5–0.9)
CRYSTALS, UA: NORMAL /HPF
D-DIMER QUANTITATIVE: 0.25 MG/L FEU
DIFFERENTIAL TYPE: ABNORMAL
EOSINOPHILS RELATIVE PERCENT: 1 % (ref 1–4)
EPITHELIAL CELLS UA: NORMAL /HPF (ref 0–5)
GFR AFRICAN AMERICAN: >60 ML/MIN
GFR NON-AFRICAN AMERICAN: 50 ML/MIN
GFR SERPL CREATININE-BSD FRML MDRD: ABNORMAL ML/MIN/{1.73_M2}
GFR SERPL CREATININE-BSD FRML MDRD: ABNORMAL ML/MIN/{1.73_M2}
GLOBULIN: ABNORMAL G/DL (ref 1.5–3.8)
GLUCOSE BLD-MCNC: 95 MG/DL (ref 70–99)
GLUCOSE URINE: NEGATIVE
HCT VFR BLD CALC: 40.1 % (ref 36.3–47.1)
HEMOGLOBIN: 12.9 G/DL (ref 11.9–15.1)
IMMATURE GRANULOCYTES: 0 %
KETONES, URINE: ABNORMAL
LEUKOCYTE ESTERASE, URINE: ABNORMAL
LIPASE: 23 U/L (ref 13–60)
LYMPHOCYTES # BLD: 49 % (ref 24–43)
MCH RBC QN AUTO: 29 PG (ref 25.2–33.5)
MCHC RBC AUTO-ENTMCNC: 32.2 G/DL (ref 28.4–34.8)
MCV RBC AUTO: 90.1 FL (ref 82.6–102.9)
MONOCYTES # BLD: 12 % (ref 3–12)
MUCUS: NORMAL
NITRITE, URINE: NEGATIVE
NRBC AUTOMATED: 0 PER 100 WBC
OTHER OBSERVATIONS UA: NORMAL
PDW BLD-RTO: 13.6 % (ref 11.8–14.4)
PH UA: 5.5 (ref 5–8)
PLATELET # BLD: 223 K/UL (ref 138–453)
PLATELET ESTIMATE: ABNORMAL
PMV BLD AUTO: 10.4 FL (ref 8.1–13.5)
POTASSIUM SERPL-SCNC: 4 MMOL/L (ref 3.7–5.3)
PRO-BNP: 67 PG/ML
PROTEIN UA: ABNORMAL
RBC # BLD: 4.45 M/UL (ref 3.95–5.11)
RBC # BLD: ABNORMAL 10*6/UL
RBC UA: NORMAL /HPF (ref 0–4)
RENAL EPITHELIAL, UA: NORMAL /HPF
SEG NEUTROPHILS: 37 % (ref 36–65)
SEGMENTED NEUTROPHILS ABSOLUTE COUNT: 2.45 K/UL (ref 1.5–8.1)
SODIUM BLD-SCNC: 139 MMOL/L (ref 135–144)
SPECIFIC GRAVITY UA: 1.03 (ref 1–1.03)
TOTAL PROTEIN: 7.8 G/DL (ref 6.4–8.3)
TRICHOMONAS: NORMAL
TROPONIN INTERP: NORMAL
TROPONIN T: NORMAL NG/ML
TROPONIN, HIGH SENSITIVITY: <6 NG/L (ref 0–14)
TURBIDITY: CLEAR
URINE HGB: NEGATIVE
UROBILINOGEN, URINE: NORMAL
WBC # BLD: 6.6 K/UL (ref 3.5–11.3)
WBC # BLD: ABNORMAL 10*3/UL
WBC UA: NORMAL /HPF (ref 0–5)
YEAST: NORMAL

## 2021-04-10 PROCEDURE — 84484 ASSAY OF TROPONIN QUANT: CPT

## 2021-04-10 PROCEDURE — 85379 FIBRIN DEGRADATION QUANT: CPT

## 2021-04-10 PROCEDURE — 83690 ASSAY OF LIPASE: CPT

## 2021-04-10 PROCEDURE — 85025 COMPLETE CBC W/AUTO DIFF WBC: CPT

## 2021-04-10 PROCEDURE — 96374 THER/PROPH/DIAG INJ IV PUSH: CPT

## 2021-04-10 PROCEDURE — 80076 HEPATIC FUNCTION PANEL: CPT

## 2021-04-10 PROCEDURE — 71045 X-RAY EXAM CHEST 1 VIEW: CPT

## 2021-04-10 PROCEDURE — 81001 URINALYSIS AUTO W/SCOPE: CPT

## 2021-04-10 PROCEDURE — 80048 BASIC METABOLIC PNL TOTAL CA: CPT

## 2021-04-10 PROCEDURE — 99285 EMERGENCY DEPT VISIT HI MDM: CPT

## 2021-04-10 PROCEDURE — 6360000002 HC RX W HCPCS: Performed by: STUDENT IN AN ORGANIZED HEALTH CARE EDUCATION/TRAINING PROGRAM

## 2021-04-10 PROCEDURE — 93005 ELECTROCARDIOGRAM TRACING: CPT | Performed by: STUDENT IN AN ORGANIZED HEALTH CARE EDUCATION/TRAINING PROGRAM

## 2021-04-10 PROCEDURE — 6370000000 HC RX 637 (ALT 250 FOR IP): Performed by: STUDENT IN AN ORGANIZED HEALTH CARE EDUCATION/TRAINING PROGRAM

## 2021-04-10 PROCEDURE — 83880 ASSAY OF NATRIURETIC PEPTIDE: CPT

## 2021-04-10 RX ORDER — IBUPROFEN 200 MG
200 TABLET ORAL EVERY 6 HOURS PRN
Qty: 120 TABLET | Refills: 0 | Status: SHIPPED | OUTPATIENT
Start: 2021-04-10 | End: 2021-05-19

## 2021-04-10 RX ORDER — MORPHINE SULFATE 4 MG/ML
2 INJECTION, SOLUTION INTRAMUSCULAR; INTRAVENOUS EVERY 4 HOURS PRN
Status: DISCONTINUED | OUTPATIENT
Start: 2021-04-10 | End: 2021-04-10 | Stop reason: HOSPADM

## 2021-04-10 RX ORDER — GABAPENTIN 300 MG/1
300 CAPSULE ORAL 2 TIMES DAILY
Qty: 60 CAPSULE | Refills: 1 | Status: SHIPPED | OUTPATIENT
Start: 2021-04-10 | End: 2021-05-10

## 2021-04-10 RX ORDER — FAMOTIDINE 40 MG/1
40 TABLET, FILM COATED ORAL DAILY
Qty: 30 TABLET | Refills: 0 | Status: SHIPPED | OUTPATIENT
Start: 2021-04-10

## 2021-04-10 RX ORDER — ACETAMINOPHEN 325 MG/1
650 TABLET ORAL EVERY 6 HOURS PRN
Qty: 30 TABLET | Refills: 0 | Status: SHIPPED | OUTPATIENT
Start: 2021-04-10 | End: 2021-05-19

## 2021-04-10 RX ORDER — ALBUTEROL SULFATE 90 UG/1
2 AEROSOL, METERED RESPIRATORY (INHALATION) 4 TIMES DAILY PRN
Qty: 3 INHALER | Refills: 1 | Status: SHIPPED | OUTPATIENT
Start: 2021-04-10

## 2021-04-10 RX ORDER — CEPHALEXIN 250 MG/1
500 CAPSULE ORAL 2 TIMES DAILY
Qty: 28 CAPSULE | Refills: 0 | Status: SHIPPED | OUTPATIENT
Start: 2021-04-10 | End: 2021-04-17

## 2021-04-10 RX ORDER — ALPRAZOLAM 0.25 MG/1
0.25 TABLET ORAL NIGHTLY PRN
Qty: 30 TABLET | Refills: 0 | Status: SHIPPED | OUTPATIENT
Start: 2021-04-10 | End: 2022-04-10

## 2021-04-10 RX ORDER — OXYCODONE HYDROCHLORIDE 5 MG/1
5 TABLET ORAL ONCE
Status: COMPLETED | OUTPATIENT
Start: 2021-04-10 | End: 2021-04-10

## 2021-04-10 RX ORDER — ACETAMINOPHEN 500 MG
1000 TABLET ORAL ONCE
Status: COMPLETED | OUTPATIENT
Start: 2021-04-10 | End: 2021-04-10

## 2021-04-10 RX ADMIN — MORPHINE SULFATE 2 MG: 4 INJECTION INTRAVENOUS at 16:13

## 2021-04-10 RX ADMIN — OXYCODONE HYDROCHLORIDE 5 MG: 5 TABLET ORAL at 17:46

## 2021-04-10 RX ADMIN — ACETAMINOPHEN 1000 MG: 500 TABLET ORAL at 16:13

## 2021-04-10 ASSESSMENT — ENCOUNTER SYMPTOMS
TROUBLE SWALLOWING: 0
WHEEZING: 0
SHORTNESS OF BREATH: 1
CHOKING: 0
VOMITING: 0
ABDOMINAL DISTENTION: 0
RHINORRHEA: 1
VOICE CHANGE: 0
NAUSEA: 0
ABDOMINAL PAIN: 0
COUGH: 1
EYES NEGATIVE: 1
DIARRHEA: 0
FACIAL SWELLING: 0
APNEA: 0
STRIDOR: 0
CHEST TIGHTNESS: 1
SORE THROAT: 0
CONSTIPATION: 0
SINUS PRESSURE: 1
SINUS PAIN: 0

## 2021-04-10 ASSESSMENT — PAIN DESCRIPTION - PAIN TYPE: TYPE: ACUTE PAIN

## 2021-04-10 NOTE — ED PROVIDER NOTES
[facility] ED  Emergency Department Encounter  Emergency Medicine Resident     Pt Name: Clement Aragon  AKK:[id number]  [place] 1955  Date of evaluation: 4/10/21  PCP:  No primary care provider on file. CHIEF COMPLAINT       Chief Complaint   Patient presents with    Chest Pain    Shortness of Breath       HISTORY OF PRESENT ILLNESS  (Location/Symptom, Timing/Onset, Context/Setting, Quality, Duration, ModifyingFactors, Severity.)      Clement Aragon is a 72 y.o. female with PMH of GERD, emphysema and chronic pain because of cervical spine injury presents the emergency department for 1 week exacerbation of chest pain with shortness of breath. Patient states that she has been feeling rather unwell for about 2 months and she ran out of all of her medications and returned from a trip from [place]. Patient states that her chest pain is substernal but because her whole body is aching is unable to tell if it is radiating, feels it is a 9 of 10 and sharp in quality. Patient states that he she is also experiencing some lower abdominal pain in the hypogastric suprapubic region with no symptoms of urgency, irritation burning or increased frequency. Patient states that she feels short of breath and that normally she takes albuterol inhalers but has not had one for several months because she ran out. Patient denies any sick contacts, fevers, chills, nausea or vomiting. Patient states that her mental status is unchanged and that she is unable to ambulate the same. PAST MEDICAL / SURGICAL / SOCIAL /FAMILY HISTORY      has a past medical history of Emphysema (subcutaneous) (surgical) resulting from a procedure, GERD (gastroesophageal reflux disease), and H. pylori infection. No other pertinent PMH on review with patient/guardian. has no past surgical history on file. No other pertinent PSH on review with patient/guardian.   Social History     Socioeconomic History    Marital status: Single     Spouse name: Not on file    Number of children: Not on file    Years of education: Not on file    Highest education level: Not on file   Occupational History    Not on file   Social Needs    Financial resource strain: Not on file    Food insecurity     Worry: Not on file     Inability: Not on file    Transportation needs     Medical: Not on file     Non-medical: Not on file   Tobacco Use    Smoking status: Current Some Day Smoker     Packs/day: 0.50     Types: Cigarettes    Smokeless tobacco: Never Used   Substance and Sexual Activity    Alcohol use: Never     Frequency: Never    Drug use: Never    Sexual activity: Not on file   Lifestyle    Physical activity     Days per week: Not on file     Minutes per session: Not on file    Stress: Not on file   Relationships    Social connections     Talks on phone: Not on file     Gets together: Not on file     Attends Sabianism service: Not on file     Active member of club or organization: Not on file     Attends meetings of clubs or organizations: Not on file     Relationship status: Not on file    Intimate partner violence     Fear of current or ex partner: Not on file     Emotionally abused: Not on file     Physically abused: Not on file     Forced sexual activity: Not on file   Other Topics Concern    Not on file   Social History Narrative    Not on file       I counseled the patient against using tobacco products. History reviewed. No pertinent family history. No other pertinent FamHx on review with patient/guardian. Allergies:  Gadolinium, Iodides, Ketorolac, Metronidazole, Propoxyphene, Tetanus-diphtheria toxoids td, Tuberculin tests, Decadron [dexamethasone], Gadolinium derivatives, Iodinated diagnostic agents, Iodine, Penicillins, Tetanus toxoids, and Other    Home Medications:  Prior to Admission medications    Medication Sig Start Date End Date Taking?  Authorizing Provider   ibuprofen (ADVIL;MOTRIN) 200 MG tablet Take 1 tablet by mouth every 6 hours as needed for Pain 4/10/21  Yes Adonis Leigh MD   gabapentin (NEURONTIN) 300 MG capsule Take 1 capsule by mouth 2 times daily for 30 days. 4/10/21 5/10/21 Yes Adonis Leigh MD   famotidine (PEPCID) 40 MG tablet Take 1 tablet by mouth daily 4/10/21  Yes Adonis Leigh MD   ALPRAZolam [place]) 0.25 MG tablet Take 1 tablet by mouth nightly as needed for Sleep. 4/10/21 4/10/22 Yes Adonis Leigh MD   albuterol sulfate HFA (VENTOLIN HFA) 108 (90 Base) MCG/ACT inhaler Inhale 2 puffs into the lungs 4 times daily as needed for Wheezing 4/10/21  Yes Adonis Leigh MD   acetaminophen (TYLENOL) 325 MG tablet Take 2 tablets by mouth every 6 hours as needed for Pain 4/10/21  Yes Adonis Leigh MD   cephALEXin (KEFLEX) 250 MG capsule Take 2 capsules by mouth 2 times daily for 7 days 4/10/21 4/17/21 Yes Adonis Leigh MD   Elastic Bandages & Supports (LUMBAR BACK BRACE/SUPPORT PAD) MISC 1 Units by Does not apply route as needed (PNEUMATIC OFFLOADING BACK BRACE) 12/11/20 12/11/21  León Pimentel MD       REVIEW OF SYSTEMS    (2-9 systems for level 4, 10 ormore for level 5)      Review of Systems   Constitutional: Positive for activity change, appetite change and fatigue. Negative for chills, diaphoresis, fever and unexpected weight change. HENT: Positive for congestion, mouth sores (A single mouth sore which self expressed and is now healing.), postnasal drip, rhinorrhea and sinus pressure. Negative for dental problem, drooling, ear discharge, ear pain, facial swelling, hearing loss, nosebleeds, sinus pain, sneezing, sore throat, trouble swallowing and voice change. Eyes: Negative. Respiratory: Positive for cough, chest tightness and shortness of breath. Negative for apnea, choking, wheezing and stridor. Cardiovascular: Negative. Gastrointestinal: Negative for abdominal distention, abdominal pain, constipation, diarrhea, nausea and vomiting. Genitourinary: Negative.     Musculoskeletal: Negative. Skin: Negative. Neurological: Negative. Psychiatric/Behavioral: Negative. PHYSICAL EXAM   (up to 7 for level 4, 8 or more for level 5)      INITIAL VITALS:   BP (!) 119/92   Pulse 86   Temp 98.6 °F (37 °C)   Resp 20   SpO2 98%     Physical Exam  Constitutional:       General: She is not in acute distress. Appearance: She is ill-appearing. She is not toxic-appearing. HENT:      Head: Normocephalic and atraumatic. Mouth/Throat:      Mouth: Mucous membranes are moist.      Pharynx: Oropharynx is clear. Eyes:      Extraocular Movements: Extraocular movements intact. Pupils: Pupils are equal, round, and reactive to light. Neck:      Musculoskeletal: Normal range of motion and neck supple. Vascular: No JVD. Cardiovascular:      Rate and Rhythm: Normal rate and regular rhythm. Pulmonary:      Effort: Pulmonary effort is normal. No tachypnea or respiratory distress. Breath sounds: Normal breath sounds. No stridor. No decreased breath sounds, wheezing or rhonchi. Chest:      Chest wall: No mass, deformity, tenderness, crepitus or edema. There is no dullness to percussion. Abdominal:      Palpations: Abdomen is soft. Tenderness: There is abdominal tenderness (Most tender in the suprapubic region) in the epigastric area, periumbilical area and suprapubic area. Musculoskeletal: Normal range of motion. Lymphadenopathy:      Cervical: No cervical adenopathy. Skin:     General: Skin is warm and dry. Capillary Refill: Capillary refill takes less than 2 seconds. Neurological:      General: No focal deficit present. Mental Status: She is alert.    Psychiatric:         Mood and Affect: Mood normal.         DIFFERENTIAL  DIAGNOSIS     PLAN (LABS / IMAGING / EKG):  Orders Placed This Encounter   Procedures    XR CHEST PORTABLE    CBC Auto Differential    Basic Metabolic Panel w/ Reflex to MG    Troponin    D-Dimer, Quantitative    Brain Natriuretic Peptide    Urinalysis Reflex to Culture    Lipase    Hepatic Function Panel    Microscopic Urinalysis    Respiratory Care Evaluation and Treat    EKG 12 Lead       MEDICATIONS ORDERED:  Orders Placed This Encounter   Medications    morphine injection 2 mg    acetaminophen (TYLENOL) tablet 1,000 mg    oxyCODONE (ROXICODONE) immediate release tablet 5 mg    ibuprofen (ADVIL;MOTRIN) 200 MG tablet     Sig: Take 1 tablet by mouth every 6 hours as needed for Pain     Dispense:  120 tablet     Refill:  0    gabapentin (NEURONTIN) 300 MG capsule     Sig: Take 1 capsule by mouth 2 times daily for 30 days. Dispense:  60 capsule     Refill:  1    famotidine (PEPCID) 40 MG tablet     Sig: Take 1 tablet by mouth daily     Dispense:  30 tablet     Refill:  0    ALPRAZolam (XANAX) 0.25 MG tablet     Sig: Take 1 tablet by mouth nightly as needed for Sleep.      Dispense:  30 tablet     Refill:  0    albuterol sulfate HFA (VENTOLIN HFA) 108 (90 Base) MCG/ACT inhaler     Sig: Inhale 2 puffs into the lungs 4 times daily as needed for Wheezing     Dispense:  3 Inhaler     Refill:  1    acetaminophen (TYLENOL) 325 MG tablet     Sig: Take 2 tablets by mouth every 6 hours as needed for Pain     Dispense:  30 tablet     Refill:  0    cephALEXin (KEFLEX) 250 MG capsule     Sig: Take 2 capsules by mouth 2 times daily for 7 days     Dispense:  28 capsule     Refill:  0           DIAGNOSTIC RESULTS / EMERGENCY DEPARTMENT COURSE / MDM     LABS:  Results for orders placed or performed during the hospital encounter of 04/10/21   CBC Auto Differential   Result Value Ref Range    WBC 6.6 3.5 - 11.3 k/uL    RBC 4.45 3.95 - 5.11 m/uL    Hemoglobin 12.9 11.9 - 15.1 g/dL    Hematocrit 40.1 36.3 - 47.1 %    MCV 90.1 82.6 - 102.9 fL    MCH 29.0 25.2 - 33.5 pg    MCHC 32.2 28.4 - 34.8 g/dL    RDW 13.6 11.8 - 14.4 %    Platelets 842 980 - 126 k/uL    MPV 10.4 8.1 - 13.5 fL    NRBC Automated 0.0 0.0 per 100 WBC    Differential Comments NOT REPORTED    Lipase   Result Value Ref Range    Lipase 23 13 - 60 U/L   Hepatic Function Panel   Result Value Ref Range    Albumin 4.4 3.5 - 5.2 g/dL    Alkaline Phosphatase 68 35 - 104 U/L    ALT 11 5 - 33 U/L    AST 21 <32 U/L    Total Bilirubin 0.22 (L) 0.3 - 1.2 mg/dL    Bilirubin, Direct 0.09 <0.31 mg/dL    Bilirubin, Indirect 0.13 0.00 - 1.00 mg/dL    Total Protein 7.8 6.4 - 8.3 g/dL    Globulin NOT REPORTED 1.5 - 3.8 g/dL    Albumin/Globulin Ratio 1.3 1.0 - 2.5   Microscopic Urinalysis   Result Value Ref Range    -          WBC, UA 5 TO 10 0 - 5 /HPF    RBC, UA 2 TO 5 0 - 4 /HPF    Casts UA  0 - 8 /LPF     2 TO 5 HYALINE Reference range defined for non-centrifuged specimen. Crystals, UA NOT REPORTED None /HPF    Epithelial Cells UA 0 TO 2 0 - 5 /HPF    Renal Epithelial, UA NOT REPORTED 0 /HPF    Bacteria, UA NOT REPORTED None    Mucus, UA NOT REPORTED None    Trichomonas, UA NOT REPORTED None    Amorphous, UA NOT REPORTED None    Other Observations UA NOT REPORTED NOT REQ. Yeast, UA NOT REPORTED None         IMPRESSION/MDM/ED COURSE:  72 y.o. female presented with chest pain, shortness of breath, epigastric/hypogastric abdominal pain and generalized feeling fatigued and unwell. Patient states that these problems ongoing for approximate 2 months and since then her medications. Differential diagnoses at this time include ACS, pneumonia, aortic dissection, pulmonary embolism, atypical chest pain, angina. ACS unlikely due to continued ongoing pain for over a week with reassuring EKG    Dissection unlikely as there is no pain that is tearing or ripping in nature, blood pressures are stable, patient has no history of atherosclerotic disease    Pneumonia possible: Patient bringing up a lot of phlegm, feeling generalized unwell and has history of upper respiratory contractions with season changes.     Pulmonary embolism: Possible as patient has having increased pain with deep breathing, to discharge. Patient/Guardian understood and agreed. Patient/Guardian had no further questions. RADIOLOGY:  XR CHEST PORTABLE   Final Result   No acute cardiopulmonary pathology. EKG  Normal sinus rhythm  No ST elevations  No T wave inversions  Normal axis  Normal intervals  Mild nonspecific changes    All EKG's are interpreted by the Emergency Department Physician who either signs or Co-signs this chart in the absence of a cardiologist.      PROCEDURES:  None    CONSULTS:  None        FINAL IMPRESSION      1.  Atypical chest pain          DISPOSITION / PLAN     DISPOSITION Decision To Discharge 04/10/2021 05:43:12 PM      PATIENT REFERREDTO:  [facility]  [address] [place] [address]  [phone]    As needed, If symptoms worsen    [address] [place] [id number] [phone]  Schedule an appointment as soon as possible for a visit         DISCHARGE MEDICATIONS:  New Prescriptions    CEPHALEXIN (KEFLEX) 250 MG CAPSULE    Take 2 capsules by mouth 2 times daily for 7 days       Reilly Dunn MD  PGY 1  Resident Physician Emergency Medicine  04/10/21 5:51 PM        (Please note that portions of this note were completed with a voice recognition program.Efforts were made to edit the dictations but occasionally words are mis-transcribed.)        Reilly Dunn MD  Resident  04/10/21 [id number]

## 2021-04-10 NOTE — ED PROVIDER NOTES
[facility]     Emergency Department     Faculty Attestation    I performed a history and physical examination of the patient and discussed management with the resident. I reviewed the resident´s note and agree with the documented findings and plan of care. Any areas of disagreement are noted on the chart. I was personally present for the key portions of any procedures. I have documented in the chart those procedures where I was not present during the key portions. I have reviewed the emergency nurses triage note. I agree with the chief complaint, past medical history, past surgical history, allergies, medications, social and family history as documented unless otherwise noted below. For Physician Assistant/ Nurse Practitioner cases/documentation I have personally evaluated this patient and have completed at least one if not all key elements of the E/M (history, physical exam, and MDM). Additional findings are as noted. Chest clear,  Heart exam normal , no pain or swelling on examination of the lower extremities , equal pulses both wrists , trachea midline. Abdomen is tender in the epigastrium and suprapubic area without pulsatile mass or bruit. Chest pain does not radiate to the back , the pain is pleuritic. Patient appears comfortable in no distress, skin is warm and dry. Patient states she has not been feeling well for the last 3 weeks. No pain to percussion of the frontal or maxillary sinuses and no facial swelling, no meningeal signs.     Tito Fajardo MD [address] [facility]  Attending Physician       EKG Interpretation    Interpreted by emergency department physician    Rhythm: normal sinus   Rate: normal/77  Axis: normal/6  Ectopy: none  Conduction: normal  ST Segments: no acute change  T Waves: no acute change  Q Waves: none    Clinical Impression: Normal EKG    John Paul Leyva, EDITH Leyva MD  04/10/21 [address] Mel[address] Hines MD  04/10/21 [id number]

## 2021-04-12 LAB
EKG ATRIAL RATE: 77 BPM
EKG P AXIS: 74 DEGREES
EKG P-R INTERVAL: 160 MS
EKG Q-T INTERVAL: 388 MS
EKG QRS DURATION: 82 MS
EKG QTC CALCULATION (BAZETT): 439 MS
EKG R AXIS: 6 DEGREES
EKG T AXIS: 31 DEGREES
EKG VENTRICULAR RATE: 77 BPM

## 2021-04-12 PROCEDURE — 93010 ELECTROCARDIOGRAM REPORT: CPT | Performed by: INTERNAL MEDICINE

## 2021-05-19 ENCOUNTER — HOSPITAL ENCOUNTER (EMERGENCY)
Age: 66
Discharge: HOME | End: 2021-05-19
Attending: EMERGENCY MEDICINE
Payer: MEDICARE

## 2021-05-19 ENCOUNTER — APPOINTMENT (OUTPATIENT)
Dept: GENERAL RADIOLOGY | Age: 66
End: 2021-05-19
Payer: MEDICARE

## 2021-05-19 VITALS
RESPIRATION RATE: 18 BRPM | OXYGEN SATURATION: 97 % | SYSTOLIC BLOOD PRESSURE: 126 MMHG | DIASTOLIC BLOOD PRESSURE: 95 MMHG | TEMPERATURE: 98.4 F | WEIGHT: 150 LBS | HEIGHT: 66 IN | HEART RATE: 73 BPM | BODY MASS INDEX: 24.11 KG/M2

## 2021-05-19 DIAGNOSIS — R68.89 MULTIPLE COMPLAINTS: ICD-10-CM

## 2021-05-19 DIAGNOSIS — R07.89 OTHER CHEST PAIN: ICD-10-CM

## 2021-05-19 DIAGNOSIS — M54.50 ACUTE EXACERBATION OF CHRONIC LOW BACK PAIN: Primary | ICD-10-CM

## 2021-05-19 DIAGNOSIS — G89.29 ACUTE EXACERBATION OF CHRONIC LOW BACK PAIN: Primary | ICD-10-CM

## 2021-05-19 LAB
-: NORMAL
ABSOLUTE EOS #: 0.09 K/UL (ref 0–0.44)
ABSOLUTE IMMATURE GRANULOCYTE: <0.03 K/UL (ref 0–0.3)
ABSOLUTE LYMPH #: 3.32 K/UL (ref 1.1–3.7)
ABSOLUTE MONO #: 0.52 K/UL (ref 0.1–1.2)
AMORPHOUS: NORMAL
ANION GAP SERPL CALCULATED.3IONS-SCNC: 16 MMOL/L (ref 9–17)
BACTERIA: NORMAL
BASOPHILS # BLD: 1 % (ref 0–2)
BASOPHILS ABSOLUTE: 0.03 K/UL (ref 0–0.2)
BILIRUBIN URINE: NEGATIVE
BUN BLDV-MCNC: 11 MG/DL (ref 8–23)
BUN/CREAT BLD: NORMAL (ref 9–20)
CALCIUM SERPL-MCNC: 9.4 MG/DL (ref 8.6–10.4)
CASTS UA: NORMAL /LPF (ref 0–8)
CHLORIDE BLD-SCNC: 105 MMOL/L (ref 98–107)
CO2: 20 MMOL/L (ref 20–31)
COLOR: YELLOW
COMMENT UA: ABNORMAL
CREAT SERPL-MCNC: 0.64 MG/DL (ref 0.5–0.9)
CRYSTALS, UA: NORMAL /HPF
DIFFERENTIAL TYPE: ABNORMAL
EOSINOPHILS RELATIVE PERCENT: 1 % (ref 1–4)
EPITHELIAL CELLS UA: NORMAL /HPF (ref 0–5)
GFR AFRICAN AMERICAN: >60 ML/MIN
GFR NON-AFRICAN AMERICAN: >60 ML/MIN
GFR SERPL CREATININE-BSD FRML MDRD: NORMAL ML/MIN/{1.73_M2}
GFR SERPL CREATININE-BSD FRML MDRD: NORMAL ML/MIN/{1.73_M2}
GLUCOSE BLD-MCNC: 81 MG/DL (ref 70–99)
GLUCOSE URINE: NEGATIVE
HCT VFR BLD CALC: 39.5 % (ref 36.3–47.1)
HEMOGLOBIN: 12.7 G/DL (ref 11.9–15.1)
IMMATURE GRANULOCYTES: 0 %
KETONES, URINE: NEGATIVE
LEUKOCYTE ESTERASE, URINE: ABNORMAL
LYMPHOCYTES # BLD: 52 % (ref 24–43)
MCH RBC QN AUTO: 28.7 PG (ref 25.2–33.5)
MCHC RBC AUTO-ENTMCNC: 32.2 G/DL (ref 28.4–34.8)
MCV RBC AUTO: 89.4 FL (ref 82.6–102.9)
MONOCYTES # BLD: 8 % (ref 3–12)
MUCUS: NORMAL
NITRITE, URINE: NEGATIVE
NRBC AUTOMATED: 0 PER 100 WBC
OTHER OBSERVATIONS UA: NORMAL
PDW BLD-RTO: 13.7 % (ref 11.8–14.4)
PH UA: 5.5 (ref 5–8)
PLATELET # BLD: 212 K/UL (ref 138–453)
PLATELET ESTIMATE: ABNORMAL
PMV BLD AUTO: 10.7 FL (ref 8.1–13.5)
POTASSIUM SERPL-SCNC: 3.8 MMOL/L (ref 3.7–5.3)
PROTEIN UA: NEGATIVE
RBC # BLD: 4.42 M/UL (ref 3.95–5.11)
RBC # BLD: ABNORMAL 10*6/UL
RBC UA: NORMAL /HPF (ref 0–4)
RENAL EPITHELIAL, UA: NORMAL /HPF
SEG NEUTROPHILS: 38 % (ref 36–65)
SEGMENTED NEUTROPHILS ABSOLUTE COUNT: 2.48 K/UL (ref 1.5–8.1)
SODIUM BLD-SCNC: 141 MMOL/L (ref 135–144)
SPECIFIC GRAVITY UA: 1.02 (ref 1–1.03)
TRICHOMONAS: NORMAL
TROPONIN INTERP: NORMAL
TROPONIN T: NORMAL NG/ML
TROPONIN, HIGH SENSITIVITY: <6 NG/L (ref 0–14)
TURBIDITY: CLEAR
URINE HGB: NEGATIVE
UROBILINOGEN, URINE: NORMAL
WBC # BLD: 6.5 K/UL (ref 3.5–11.3)
WBC # BLD: ABNORMAL 10*3/UL
WBC UA: NORMAL /HPF (ref 0–5)
YEAST: NORMAL

## 2021-05-19 PROCEDURE — 81001 URINALYSIS AUTO W/SCOPE: CPT

## 2021-05-19 PROCEDURE — 72100 X-RAY EXAM L-S SPINE 2/3 VWS: CPT

## 2021-05-19 PROCEDURE — 6370000000 HC RX 637 (ALT 250 FOR IP): Performed by: EMERGENCY MEDICINE

## 2021-05-19 PROCEDURE — 6360000002 HC RX W HCPCS: Performed by: EMERGENCY MEDICINE

## 2021-05-19 PROCEDURE — 99284 EMERGENCY DEPT VISIT MOD MDM: CPT

## 2021-05-19 PROCEDURE — 72040 X-RAY EXAM NECK SPINE 2-3 VW: CPT

## 2021-05-19 PROCEDURE — 71046 X-RAY EXAM CHEST 2 VIEWS: CPT

## 2021-05-19 PROCEDURE — 85025 COMPLETE CBC W/AUTO DIFF WBC: CPT

## 2021-05-19 PROCEDURE — 93005 ELECTROCARDIOGRAM TRACING: CPT | Performed by: EMERGENCY MEDICINE

## 2021-05-19 PROCEDURE — 84484 ASSAY OF TROPONIN QUANT: CPT

## 2021-05-19 PROCEDURE — 80048 BASIC METABOLIC PNL TOTAL CA: CPT

## 2021-05-19 PROCEDURE — 72072 X-RAY EXAM THORAC SPINE 3VWS: CPT

## 2021-05-19 PROCEDURE — 96372 THER/PROPH/DIAG INJ SC/IM: CPT

## 2021-05-19 RX ORDER — TIZANIDINE 2 MG/1
2 TABLET ORAL 3 TIMES DAILY PRN
Qty: 30 TABLET | Refills: 0 | Status: SHIPPED | OUTPATIENT
Start: 2021-05-19 | End: 2022-03-01 | Stop reason: SDUPTHER

## 2021-05-19 RX ORDER — ORPHENADRINE CITRATE 30 MG/ML
60 INJECTION INTRAMUSCULAR; INTRAVENOUS ONCE
Status: COMPLETED | OUTPATIENT
Start: 2021-05-19 | End: 2021-05-19

## 2021-05-19 RX ORDER — IBUPROFEN 600 MG/1
600 TABLET ORAL EVERY 6 HOURS PRN
Qty: 40 TABLET | Refills: 0 | Status: SHIPPED | OUTPATIENT
Start: 2021-05-19 | End: 2021-08-07

## 2021-05-19 RX ORDER — ACETAMINOPHEN 500 MG
1000 TABLET ORAL 3 TIMES DAILY
Qty: 60 TABLET | Refills: 0 | Status: SHIPPED | OUTPATIENT
Start: 2021-05-19 | End: 2021-08-07

## 2021-05-19 RX ORDER — LIDOCAINE 4 G/G
1 PATCH TOPICAL ONCE
Status: DISCONTINUED | OUTPATIENT
Start: 2021-05-19 | End: 2021-05-19 | Stop reason: HOSPADM

## 2021-05-19 RX ORDER — ACETAMINOPHEN 500 MG
1000 TABLET ORAL ONCE
Status: COMPLETED | OUTPATIENT
Start: 2021-05-19 | End: 2021-05-19

## 2021-05-19 RX ORDER — IBUPROFEN 400 MG/1
600 TABLET ORAL ONCE
Status: COMPLETED | OUTPATIENT
Start: 2021-05-19 | End: 2021-05-19

## 2021-05-19 RX ORDER — LIDOCAINE 50 MG/G
1 PATCH TOPICAL DAILY
Qty: 30 PATCH | Refills: 0 | Status: SHIPPED | OUTPATIENT
Start: 2021-05-19 | End: 2021-06-18

## 2021-05-19 RX ADMIN — IBUPROFEN 600 MG: 400 TABLET, FILM COATED ORAL at 16:31

## 2021-05-19 RX ADMIN — ACETAMINOPHEN 1000 MG: 500 TABLET ORAL at 16:30

## 2021-05-19 RX ADMIN — ORPHENADRINE CITRATE 60 MG: 60 INJECTION INTRAMUSCULAR; INTRAVENOUS at 16:36

## 2021-05-19 ASSESSMENT — ENCOUNTER SYMPTOMS
SHORTNESS OF BREATH: 1
NAUSEA: 0
CONSTIPATION: 0
BACK PAIN: 1
SORE THROAT: 0
ABDOMINAL PAIN: 1
DIARRHEA: 0
VOMITING: 0

## 2021-05-19 ASSESSMENT — PAIN DESCRIPTION - LOCATION: LOCATION: ABDOMEN;BACK;HIP

## 2021-05-19 ASSESSMENT — PAIN DESCRIPTION - PAIN TYPE: TYPE: CHRONIC PAIN

## 2021-05-19 ASSESSMENT — PAIN DESCRIPTION - ONSET: ONSET: ON-GOING

## 2021-05-19 NOTE — ED PROVIDER NOTES
FACULTY SIGN-OUT  ADDENDUM       Patient: Jaylan Falcon   MRN: [id number]  PCP:  No primary care provider on file. Attestation  I was available and discussed any additional care issues that arose and coordinated the management plans with the resident(s) caring for the patient during my duty period. Any areas of disagreement with resident's documentation of care or procedures are noted on the chart. I was personally present for the key portions of any/all procedures during my duty period. I have documented in the chart those procedures where I was not present during the key portions. The patient's initial evaluation and plan have been discussed with the prior provider who initially evaluated the patient. Pertinent Comments: The patient is a 72 y.o. female taken in signout with abdominal pain and some hip pain appears very reproducible per previous attending. We are awaiting work-up and symptomatic control and reevaluation    ED COURSE      The patient was given the following medications:  Orders Placed This Encounter   Medications    acetaminophen (TYLENOL) tablet 1,000 mg    ibuprofen (ADVIL;MOTRIN) tablet 600 mg    orphenadrine (NORFLEX) injection 60 mg    lidocaine 4 % external patch 1 patch    acetaminophen (TYLENOL) 500 MG tablet     Sig: Take 2 tablets by mouth 3 times daily for 10 days     Dispense:  60 tablet     Refill:  0    ibuprofen (IBU) 600 MG tablet     Sig: Take 1 tablet by mouth every 6 hours as needed for Pain     Dispense:  40 tablet     Refill:  0    tiZANidine (ZANAFLEX) 2 MG tablet     Sig: Take 1 tablet by mouth 3 times daily as needed (Muscle spasm)     Dispense:  30 tablet     Refill:  0    lidocaine (LIDODERM) 5 %     Sig: Place 1 patch onto the skin daily 12 hours on, 12 hours off.      Dispense:  30 patch     Refill:  0       RECENT VITALS:   BP: (!) 126/95  Pulse: 73  Resp: 18  Temp: 98.4 °F (36.9 °C) SpO2: 97 %    (Please note that portions of this note were completed with a voice recognition program.  Efforts were made to edit the dictations but occasionally words are mis-transcribed.)    Fleet Prader, MD Carrie Reasoner  Attending Emergency Medicine Physician       Fleet Prader, MD  05/19/21 [phone]

## 2021-05-19 NOTE — ED PROVIDER NOTES
[facility]  ED  Emergency Department Encounter  EmergencyMedicine Resident     Pt Pebbles Ramachandran  MRN: [id number]  [place] 1955  Date of evaluation: 5/19/21  PCP:  No primary care provider on file. CHIEF COMPLAINT       Chief Complaint   Patient presents with    Abdominal Pain    Hip Pain     left    Back Pain       HISTORY OF PRESENT ILLNESS  (Location/Symptom, Timing/Onset, Context/Setting, Quality, Duration, Modifying Factors, Severity.)      Calvin Luke is a 72 y.o. female who presents to the emergency department with multiple complaints. Patient is a poor historian. Patient over the past 2 months has been experiencing worsening lower back pain as well as pain across the entire lower extremity sometimes radiating down to her knee and sometimes all the way down to the foot. Attributes the pain to \"sciatica\" and states she has had this pain for \"years\" but used to follow with pain management although she no longer does due to not being able to follow-up for a couple of months. Endorses a chronic history of intermittent urinary incontinence not specifically associated with worsening in the back pain. States that her bowel movements have been normal and denies fecal incontinence or specific retention symptoms. Denies saddle anesthesia but says \"sometimes I have shooting pain in my vagina\". This is also chronic and she denies discharge. Patient also wishes to be evaluated for a possible recurrent urinary tract infection stating that she has had a history of previous urinary tract infections and finished her antibiotic a few weeks ago but is now having dark, foul-smelling urine and believes that she now has another UTI. Patient also endorses a multi week history of midsternal chest pain sometimes with radiation to the left chest although she states that now it is on the right. The pain is somewhat worsened with exertion as is the pain in her back. Last echocardiogram on September 9, 2020 demonstrated LVEF greater than 55%. PAST MEDICAL / SURGICAL / SOCIAL / FAMILY HISTORY      has a past medical history of Emphysema (subcutaneous) (surgical) resulting from a procedure, GERD (gastroesophageal reflux disease), and H. pylori infection. has no past surgical history on file. Social History     Socioeconomic History    Marital status: Single     Spouse name: Not on file    Number of children: Not on file    Years of education: Not on file    Highest education level: Not on file   Occupational History    Not on file   Tobacco Use    Smoking status: Current Some Day Smoker     Packs/day: 0.50     Types: Cigarettes    Smokeless tobacco: Never Used   Substance and Sexual Activity    Alcohol use: Never    Drug use: Never    Sexual activity: Not on file   Other Topics Concern    Not on file   Social History Narrative    Not on file     Social Determinants of Health     Financial Resource Strain:     Difficulty of Paying Living Expenses:    Food Insecurity:     Worried About Running Out of Food in the Last Year:     [address] in the Last Year:    Transportation Needs:     Lack of Transportation (Medical):  Lack of Transportation (Non-Medical):    Physical Activity:     Days of Exercise per Week:     Minutes of Exercise per Session:    Stress:     Feeling of Stress :    Social Connections:     Frequency of Communication with Friends and Family:     Frequency of Social Gatherings with Friends and Family:     Attends Congregational Services:     Active Member of Clubs or Organizations:     Attends Club or Organization Meetings:     Marital Status:    Intimate Partner Violence:     Fear of Current or Ex-Partner:     Emotionally Abused:     Physically Abused:     Sexually Abused:        History reviewed. No pertinent family history.     Allergies:  Gadolinium, Iodides, Ketorolac, Metronidazole, Propoxyphene, Tetanus-diphtheria toxoids td, Negative for difficulty urinating, dysuria and vaginal discharge. Musculoskeletal: Positive for arthralgias, back pain, gait problem, myalgias, neck pain and neck stiffness. Neurological: Positive for dizziness, weakness (LLE), light-headedness and numbness (Patient initially states the entire left side of her body is numb but then clarifies and says it is just painful). Psychiatric/Behavioral: Negative for agitation and confusion. PHYSICAL EXAM   (up to 7 for level 4, 8 or more for level 5)      INITIAL VITALS:   BP (!) 126/95   Pulse 73   Temp 98.4 °F (36.9 °C) (Oral)   Resp 18   Ht 5' 6\" (1.676 m)   Wt 150 lb (68 kg)   SpO2 97%   BMI 24.21 kg/m²     Physical Exam  Vitals and nursing note reviewed. Constitutional:       General: She is not in acute distress. Appearance: Normal appearance. She is well-developed. She is not ill-appearing or diaphoretic. Comments: Patient is tender everywhere that the provider touches the patient including the entire back, chest, both feet, entire left leg, some areas of the right leg, upper extremities. HENT:      Head: Normocephalic and atraumatic. Right Ear: External ear normal.      Left Ear: External ear normal.      Nose: Nose normal.      Mouth/Throat:      Mouth: Mucous membranes are moist.   Eyes:      Extraocular Movements: Extraocular movements intact. Conjunctiva/sclera: Conjunctivae normal.   Neck:      Trachea: No tracheal deviation. Cardiovascular:      Rate and Rhythm: Normal rate and regular rhythm. Heart sounds: Normal heart sounds. No murmur heard. No friction rub. No gallop. Pulmonary:      Effort: Pulmonary effort is normal. No respiratory distress. Breath sounds: Normal breath sounds. No wheezing, rhonchi or rales. Abdominal:      General: Abdomen is flat. There is no distension. Palpations: Abdomen is soft. There is no mass. Tenderness: There is abdominal tenderness (Diffuse).  There is no guarding or rebound. Musculoskeletal:         General: Tenderness (As above) present. No swelling, deformity or signs of injury. Normal range of motion. Cervical back: Normal range of motion and neck supple. Comments: Antigravity all 4 extremities   Skin:     General: Skin is warm and dry. Capillary Refill: Capillary refill takes less than 2 seconds. Coloration: Skin is not jaundiced. Findings: No bruising or lesion. Neurological:      General: No focal deficit present. Mental Status: She is alert and oriented to person, place, and time. Mental status is at baseline. Sensory: No sensory deficit. Motor: No abnormal muscle tone. Gait: Gait abnormal (Slightly antalgic gait favoring the right leg). Comments: Proprioception is intact of the bilateral lower extremities as well as symmetric patellar, Babinski, and Achilles reflexes and symmetric sensation. However everywhere that the provider touches is painful and reflexes are also painful. Proprioception test is painful. Pulses are intact.          DIFFERENTIAL  DIAGNOSIS     PLAN (LABS / IMAGING / EKG):  Orders Placed This Encounter   Procedures    XR CERVICAL SPINE (2-3 VIEWS)    XR THORACIC SPINE (3 VIEWS)    XR LUMBAR SPINE (2-3 VIEWS)    XR CHEST (2 VW)    CBC Auto Differential    Basic Metabolic Panel w/ Reflex to MG    Urinalysis Reflex to Culture    Microscopic Urinalysis    Troponin    EKG 12 Lead       MEDICATIONS ORDERED:  Orders Placed This Encounter   Medications    acetaminophen (TYLENOL) tablet 1,000 mg    ibuprofen (ADVIL;MOTRIN) tablet 600 mg    orphenadrine (NORFLEX) injection 60 mg    lidocaine 4 % external patch 1 patch    acetaminophen (TYLENOL) 500 MG tablet     Sig: Take 2 tablets by mouth 3 times daily for 10 days     Dispense:  60 tablet     Refill:  0    ibuprofen (IBU) 600 MG tablet     Sig: Take 1 tablet by mouth every 6 hours as needed for Pain     Dispense:  40 tablet Refill:  0    tiZANidine (ZANAFLEX) 2 MG tablet     Sig: Take 1 tablet by mouth 3 times daily as needed (Muscle spasm)     Dispense:  30 tablet     Refill:  0    lidocaine (LIDODERM) 5 %     Sig: Place 1 patch onto the skin daily 12 hours on, 12 hours off. Dispense:  30 patch     Refill:  0       DDX: Jefe Randolph is a 72 y.o. female who presents to the emergency department with multiple complaints.  Differential diagnosis includes ACS, fibromyalgia, sciatica, chronic back pain, degenerative disc or vertebral disease, musculoskeletal pain    DIAGNOSTIC RESULTS / EMERGENCY DEPARTMENT COURSE / MDM   LAB RESULTS:  Results for orders placed or performed during the hospital encounter of 05/19/21   CBC Auto Differential   Result Value Ref Range    WBC 6.5 3.5 - 11.3 k/uL    RBC 4.42 3.95 - 5.11 m/uL    Hemoglobin 12.7 11.9 - 15.1 g/dL    Hematocrit 39.5 36.3 - 47.1 %    MCV 89.4 82.6 - 102.9 fL    MCH 28.7 25.2 - 33.5 pg    MCHC 32.2 28.4 - 34.8 g/dL    RDW 13.7 11.8 - 14.4 %    Platelets 647 861 - 927 k/uL    MPV 10.7 8.1 - 13.5 fL    NRBC Automated 0.0 0.0 per 100 WBC    Differential Type NOT REPORTED     Seg Neutrophils 38 36 - 65 %    Lymphocytes 52 (H) 24 - 43 %    Monocytes 8 3 - 12 %    Eosinophils % 1 1 - 4 %    Basophils 1 0 - 2 %    Immature Granulocytes 0 0 %    Segs Absolute 2.48 1.50 - 8.10 k/uL    Absolute Lymph # 3.32 1.10 - 3.70 k/uL    Absolute Mono # 0.52 0.10 - 1.20 k/uL    Absolute Eos # 0.09 0.00 - 0.44 k/uL    Basophils Absolute 0.03 0.00 - 0.20 k/uL    Absolute Immature Granulocyte <0.03 0.00 - 0.30 k/uL    WBC Morphology NOT REPORTED     RBC Morphology NOT REPORTED     Platelet Estimate NOT REPORTED    Basic Metabolic Panel w/ Reflex to MG   Result Value Ref Range    Glucose 81 70 - 99 mg/dL    BUN 11 8 - 23 mg/dL    CREATININE 0.64 0.50 - 0.90 mg/dL    Bun/Cre Ratio NOT REPORTED 9 - 20    Calcium 9.4 8.6 - 10.4 mg/dL    Sodium 141 135 - 144 mmol/L    Potassium 3.8 3.7 - 5.3 mmol/L Chloride 105 98 - 107 mmol/L    CO2 20 20 - 31 mmol/L    Anion Gap 16 9 - 17 mmol/L    GFR Non-African American >60 >60 mL/min    GFR African American >60 >60 mL/min    GFR Comment          GFR Staging NOT REPORTED    Urinalysis Reflex to Culture    Specimen: Urine, clean catch   Result Value Ref Range    Color, UA YELLOW YELLOW    Turbidity UA CLEAR CLEAR    Glucose, Ur NEGATIVE NEGATIVE    Bilirubin Urine NEGATIVE NEGATIVE    Ketones, Urine NEGATIVE NEGATIVE    Specific Gravity, UA 1.023 1.005 - 1.030    Urine Hgb NEGATIVE NEGATIVE    pH, UA 5.5 5.0 - 8.0    Protein, UA NEGATIVE NEGATIVE    Urobilinogen, Urine Normal Normal    Nitrite, Urine NEGATIVE NEGATIVE    Leukocyte Esterase, Urine TRACE (A) NEGATIVE    Urinalysis Comments NOT REPORTED    Microscopic Urinalysis   Result Value Ref Range    -          WBC, UA 5 TO 10 0 - 5 /HPF    RBC, UA 5 TO 10 0 - 4 /HPF    Casts UA  0 - 8 /LPF     0 TO 2 HYALINE Reference range defined for non-centrifuged specimen. Crystals, UA NOT REPORTED None /HPF    Epithelial Cells UA 2 TO 5 0 - 5 /HPF    Renal Epithelial, UA NOT REPORTED 0 /HPF    Bacteria, UA NOT REPORTED None    Mucus, UA NOT REPORTED None    Trichomonas, UA NOT REPORTED None    Amorphous, UA NOT REPORTED None    Other Observations UA NOT REPORTED NOT REQ. Yeast, UA NOT REPORTED None   Troponin   Result Value Ref Range    Troponin, High Sensitivity <6 0 - 14 ng/L    Troponin T NOT REPORTED <0.03 ng/mL    Troponin Interp NOT REPORTED    EKG 12 Lead   Result Value Ref Range    Ventricular Rate 63 BPM    Atrial Rate 63 BPM    P-R Interval 164 ms    QRS Duration 86 ms    Q-T Interval 428 ms    QTc Calculation (Bazett) 437 ms    P Axis 68 degrees    R Axis 14 degrees    T Axis 23 degrees       IMPRESSION: Richard Guerra is a 72 y.o. female who presents to the emergency department with multiple complaints.   On examination vital signs are unremarkable and examination demonstrates a well-appearing female who is intermittently tearful complaining of multiple complaints and with changing history such as initially stating that the entire left side of her body is numb but then clarifying and saying that it is painful but then subsequently denying arm pain. Somewhat difficult to keep track of the patient's multiple chronic complaints but she is complaining of chest pain and has midline tenderness of the vertebral bodies. Low suspicion for cord pathology at this time due to the absence of objective weakness of either lower extremity or asymmetric reflexes. Differential does include a vertebral abnormality such as compression deformity from occult malignancy or perhaps overuse injuries and so x-rays have been ordered. Basic cardiac work-up has also been ordered as well as repeat urinalysis to exclude recurrent UTI. Patient verbalizes understanding and agreement with plan as well as need for follow-up and reestablishment with pain management. RADIOLOGY:  No results found. EKG  EKG Interpretation    Interpreted by emergency department physician    Rhythm: normal sinus   Rate: normal  Axis: normal  Ectopy: none  Conduction: normal  ST Segments: no acute change  T Waves: no acute change  Q Waves: none    Clinical Impression: no acute changes and normal EKG    Guzman Koo MD    All EKG's are interpreted by the Emergency Department Physician who either signs or co-signs this chart in the absence of a cardiologist.    EMERGENCY DEPARTMENT COURSE:  ED Course as of May 19 1757   Wed May 19, 2021   1643 Patient became very frustrated and began arguing with the nurse stating that she was in pain and was not getting any treatments. Verbal de-escalation was successful. Awaiting x-rays. [TS]   8126 On reevaluation patient endorses significant improvement in her symptoms. We discussed with patient the option of going to the observation unit for cardiology evaluation in the morning but the patient elected to be discharged. Given that the patient's heart score is 3, feel that this is reasonable at this time. Counseled patient on strict return precautions and patient verbalizes understanding and agreement with plan. [TS]      ED Course User Index  [TS] Mychal Pillai MD       PROCEDURES:  None    CONSULTS:  None    CRITICAL CARE:  Please see attending note. FINAL IMPRESSION      1. Acute exacerbation of chronic low back pain    2. Other chest pain    3. Multiple complaints          DISPOSITION / PLAN     DISPOSITION        PATIENT REFERRED TO:  [facility]  [address] [address] [phone]  Schedule an appointment as soon as possible for a visit in 1 week  For followup, for PCP establishment    [address],  [address] [facility] [address] [phone]  Schedule an appointment as soon as possible for a visit in 1 week  For followup    [facility] [address] [place] [address] [phone]  Schedule an appointment as soon as possible for a visit in 1 week  For followup      DISCHARGE MEDICATIONS:  New Prescriptions    ACETAMINOPHEN (TYLENOL) 500 MG TABLET    Take 2 tablets by mouth 3 times daily for 10 days    IBUPROFEN (IBU) 600 MG TABLET    Take 1 tablet by mouth every 6 hours as needed for Pain    LIDOCAINE (LIDODERM) 5 %    Place 1 patch onto the skin daily 12 hours on, 12 hours off.    TIZANIDINE (ZANAFLEX) 2 MG TABLET    Take 1 tablet by mouth 3 times daily as needed (Muscle spasm)       Mychal Pillai MD  Emergency Medicine Resident    This patient was evaluated in the Emergency Department for symptoms described in the history of present illness. He/she was evaluated in the context of the global COVID-19 pandemic, which necessitated consideration that the patient might be at risk for infection with the SARS-CoV-2 virus that causes COVID-19.  Institutional protocols and algorithms that pertain to the evaluation of patients at risk for COVID-19 are in a state of rapid change based on information released by regulatory bodies including the CDC and federal and state organizations. These policies and algorithms were followed during the patient's care in the ED.     (Please note that portions of thisnote were completed with a voice recognition program.  Efforts were made to edit the dictations but occasionally words are mis-transcribed.)        Nickolas Bianchi MD  Resident  05/19/21 [id number]

## 2021-05-19 NOTE — ED PROVIDER NOTES
[facility] ED  eMERGENCY dEPARTMENT eNCOUnter   Attending Attestation     Pt Name: Katie Coronado  MRN: [id number]  [place] 1955  Date of evaluation: 5/19/21       Katie Coronado is a 72 y.o. female who presents with Abdominal Pain, Hip Pain (left), and Back Pain      History: Patient is abdominal pain hip pain and back pain. Patient states that the pain radiates to the left thigh from the left back. Patient has pain is constant and severe. Exam: Heart rate and rhythm are regular. Lungs are clear to auscultation bilaterally. Abdomen is soft, nontender. Patient is tenderness over the left lower back through the buttocks around the front of the leg. Will screen for ACS, treat pain, I believe this is secondary to    Sciatica. EKG shows normal sinus rhythm with rate of 63 bpm.  Normal axis. No ST elevations or depressions. T waves are upright except for in lead III. Nonspecific EKG without any blocks or arrhythmias. I performed a history and physical examination of the patient and discussed management with the resident. I reviewed the residents note and agree with the documented findings and plan of care. Any areas of disagreement are noted on the chart. I was personally present for the key portions of any procedures. I have documented in the chart those procedures where I was not present during the key portions. I have personally reviewed all images and agree with the resident's interpretation. I have reviewed the emergency nurses triage note. I agree with the chief complaint, past medical history, past surgical history, allergies, medications, social and family history as documented unless otherwise noted below. Documentation of the HPI, Physical Exam and Medical Decision Making performed by medical students or scribes is based on my personal performance of the HPI, PE and MDM.  For Phys Assistant/ Nurse Practitioner cases/documentation I have had a face to face evaluation of this patient and have completed at least one if not all key elements of the E/M (history, physical exam, and MDM). Additional findings are as noted. For APC cases I have personally evaluated and examined the patient in conjunction with the APC and agree with the treatment plan and disposition of the patient as recorded by the APC.     Lina Simms MD  Attending Emergency  Physician       César Maria MD  05/19/21 [id number]

## 2021-05-20 LAB
EKG ATRIAL RATE: 63 BPM
EKG P AXIS: 68 DEGREES
EKG P-R INTERVAL: 164 MS
EKG Q-T INTERVAL: 428 MS
EKG QRS DURATION: 86 MS
EKG QTC CALCULATION (BAZETT): 437 MS
EKG R AXIS: 14 DEGREES
EKG T AXIS: 23 DEGREES
EKG VENTRICULAR RATE: 63 BPM

## 2021-05-20 PROCEDURE — 93010 ELECTROCARDIOGRAM REPORT: CPT | Performed by: INTERNAL MEDICINE

## 2021-08-07 ENCOUNTER — HOSPITAL ENCOUNTER (EMERGENCY)
Age: 66
Discharge: HOME | End: 2021-08-07
Attending: EMERGENCY MEDICINE
Payer: MEDICARE

## 2021-08-07 VITALS
RESPIRATION RATE: 16 BRPM | DIASTOLIC BLOOD PRESSURE: 89 MMHG | HEART RATE: 71 BPM | SYSTOLIC BLOOD PRESSURE: 128 MMHG | OXYGEN SATURATION: 97 % | TEMPERATURE: 98.7 F

## 2021-08-07 DIAGNOSIS — G89.29 CHRONIC RIGHT-SIDED LOW BACK PAIN WITH SCIATICA, SCIATICA LATERALITY UNSPECIFIED: Primary | ICD-10-CM

## 2021-08-07 DIAGNOSIS — M54.40 CHRONIC RIGHT-SIDED LOW BACK PAIN WITH SCIATICA, SCIATICA LATERALITY UNSPECIFIED: Primary | ICD-10-CM

## 2021-08-07 DIAGNOSIS — M25.511 ACUTE PAIN OF RIGHT SHOULDER: ICD-10-CM

## 2021-08-07 LAB
ABSOLUTE EOS #: 0.06 K/UL (ref 0–0.44)
ABSOLUTE IMMATURE GRANULOCYTE: <0.03 K/UL (ref 0–0.3)
ABSOLUTE LYMPH #: 3.37 K/UL (ref 1.1–3.7)
ABSOLUTE MONO #: 0.46 K/UL (ref 0.1–1.2)
ALBUMIN SERPL-MCNC: 4.1 G/DL (ref 3.5–5.2)
ALBUMIN/GLOBULIN RATIO: 1.4 (ref 1–2.5)
ALP BLD-CCNC: 59 U/L (ref 35–104)
ALT SERPL-CCNC: 7 U/L (ref 5–33)
ANION GAP SERPL CALCULATED.3IONS-SCNC: 11 MMOL/L (ref 9–17)
AST SERPL-CCNC: 15 U/L
BASOPHILS # BLD: 1 % (ref 0–2)
BASOPHILS ABSOLUTE: 0.04 K/UL (ref 0–0.2)
BILIRUB SERPL-MCNC: 0.43 MG/DL (ref 0.3–1.2)
BUN BLDV-MCNC: 10 MG/DL (ref 8–23)
BUN/CREAT BLD: NORMAL (ref 9–20)
CALCIUM SERPL-MCNC: 8.9 MG/DL (ref 8.6–10.4)
CHLORIDE BLD-SCNC: 107 MMOL/L (ref 98–107)
CO2: 22 MMOL/L (ref 20–31)
CREAT SERPL-MCNC: 0.65 MG/DL (ref 0.5–0.9)
DIFFERENTIAL TYPE: ABNORMAL
EOSINOPHILS RELATIVE PERCENT: 1 % (ref 1–4)
GFR AFRICAN AMERICAN: >60 ML/MIN
GFR NON-AFRICAN AMERICAN: >60 ML/MIN
GFR SERPL CREATININE-BSD FRML MDRD: NORMAL ML/MIN/{1.73_M2}
GFR SERPL CREATININE-BSD FRML MDRD: NORMAL ML/MIN/{1.73_M2}
GLUCOSE BLD-MCNC: 80 MG/DL (ref 70–99)
HCT VFR BLD CALC: 37.5 % (ref 36.3–47.1)
HEMOGLOBIN: 12.3 G/DL (ref 11.9–15.1)
IMMATURE GRANULOCYTES: 0 %
LYMPHOCYTES # BLD: 54 % (ref 24–43)
MCH RBC QN AUTO: 28.5 PG (ref 25.2–33.5)
MCHC RBC AUTO-ENTMCNC: 32.8 G/DL (ref 28.4–34.8)
MCV RBC AUTO: 87 FL (ref 82.6–102.9)
MONOCYTES # BLD: 7 % (ref 3–12)
NRBC AUTOMATED: 0 PER 100 WBC
PDW BLD-RTO: 14 % (ref 11.8–14.4)
PLATELET # BLD: 234 K/UL (ref 138–453)
PLATELET ESTIMATE: ABNORMAL
PMV BLD AUTO: 10.1 FL (ref 8.1–13.5)
POTASSIUM SERPL-SCNC: 3.9 MMOL/L (ref 3.7–5.3)
RBC # BLD: 4.31 M/UL (ref 3.95–5.11)
RBC # BLD: ABNORMAL 10*6/UL
SEG NEUTROPHILS: 37 % (ref 36–65)
SEGMENTED NEUTROPHILS ABSOLUTE COUNT: 2.29 K/UL (ref 1.5–8.1)
SODIUM BLD-SCNC: 140 MMOL/L (ref 135–144)
TOTAL PROTEIN: 7 G/DL (ref 6.4–8.3)
TROPONIN INTERP: NORMAL
TROPONIN T: NORMAL NG/ML
TROPONIN, HIGH SENSITIVITY: <6 NG/L (ref 0–14)
TSH SERPL DL<=0.05 MIU/L-ACNC: 0.71 MIU/L (ref 0.3–5)
WBC # BLD: 6.2 K/UL (ref 3.5–11.3)
WBC # BLD: ABNORMAL 10*3/UL

## 2021-08-07 PROCEDURE — 93005 ELECTROCARDIOGRAM TRACING: CPT | Performed by: STUDENT IN AN ORGANIZED HEALTH CARE EDUCATION/TRAINING PROGRAM

## 2021-08-07 PROCEDURE — 80053 COMPREHEN METABOLIC PANEL: CPT

## 2021-08-07 PROCEDURE — 84484 ASSAY OF TROPONIN QUANT: CPT

## 2021-08-07 PROCEDURE — 6370000000 HC RX 637 (ALT 250 FOR IP): Performed by: STUDENT IN AN ORGANIZED HEALTH CARE EDUCATION/TRAINING PROGRAM

## 2021-08-07 PROCEDURE — 84443 ASSAY THYROID STIM HORMONE: CPT

## 2021-08-07 PROCEDURE — 99284 EMERGENCY DEPT VISIT MOD MDM: CPT

## 2021-08-07 PROCEDURE — 85025 COMPLETE CBC W/AUTO DIFF WBC: CPT

## 2021-08-07 RX ORDER — IBUPROFEN 600 MG/1
600 TABLET ORAL EVERY 6 HOURS PRN
Qty: 20 TABLET | Refills: 0 | Status: SHIPPED | OUTPATIENT
Start: 2021-08-07 | End: 2022-01-18 | Stop reason: SDUPTHER

## 2021-08-07 RX ORDER — LIDOCAINE 50 MG/G
1 PATCH TOPICAL DAILY
Qty: 10 PATCH | Refills: 0 | Status: SHIPPED | OUTPATIENT
Start: 2021-08-07 | End: 2021-08-17

## 2021-08-07 RX ORDER — IBUPROFEN 600 MG/1
600 TABLET ORAL EVERY 6 HOURS PRN
Qty: 120 TABLET | Refills: 5 | Status: SHIPPED | OUTPATIENT
Start: 2021-08-07 | End: 2021-08-07 | Stop reason: SDUPTHER

## 2021-08-07 RX ORDER — ACETAMINOPHEN 500 MG
500 TABLET ORAL EVERY 6 HOURS PRN
Qty: 20 TABLET | Refills: 0 | Status: SHIPPED | OUTPATIENT
Start: 2021-08-07 | End: 2021-08-27

## 2021-08-07 RX ORDER — ACETAMINOPHEN 500 MG
1000 TABLET ORAL ONCE
Status: COMPLETED | OUTPATIENT
Start: 2021-08-07 | End: 2021-08-07

## 2021-08-07 RX ADMIN — ACETAMINOPHEN 1000 MG: 500 TABLET ORAL at 14:26

## 2021-08-07 ASSESSMENT — ENCOUNTER SYMPTOMS
VOMITING: 0
ABDOMINAL PAIN: 0
COUGH: 0
BACK PAIN: 1
NAUSEA: 0
WHEEZING: 0
SHORTNESS OF BREATH: 0

## 2021-08-07 ASSESSMENT — PAIN SCALES - GENERAL
PAINLEVEL_OUTOF10: 10
PAINLEVEL_OUTOF10: 10

## 2021-08-07 ASSESSMENT — PAIN DESCRIPTION - PAIN TYPE: TYPE: ACUTE PAIN

## 2021-08-07 ASSESSMENT — PAIN DESCRIPTION - LOCATION: LOCATION: GENERALIZED

## 2021-08-07 NOTE — ED PROVIDER NOTES
[facility]     Emergency Department     Faculty Attestation        I performed a history and physical examination of the patient and discussed management with the resident. I reviewed the residents note and agree with the documented findings and plan of care. Any areas of disagreement are noted on the chart. I was personally present for the key portions of any procedures. I have documented in the chart those procedures where I was not present during the key portions. I have reviewed the emergency nurses triage note. I agree with the chief complaint, past medical history, past surgical history, allergies, medications, social and family history as documented unless otherwise noted below. For mid-level providers such as nurse practitioners as well as physicians assistants:    I have personally seen and evaluated the patient. I find the patient's history and physical exam are consistent with NP/PA documentation. I agree with the care provided, treatment rendered, disposition, & follow-up plan. Additional findings are as noted. Vital Signs: /89   Pulse 71   Temp 98.7 °F (37.1 °C) (Oral)   Resp 16   SpO2 97%   PCP:  LORRAINE Gutierrez    Pertinent Comments:     Patient presents with multiple chronic and varied complaints but her main complaint is low back pain rating into her leg this is chronic in nature and unchanged. No bowel or bladder incontinence, fevers or chills. Low clinical suspicion for significant spinal pathology. Able walk and ambulate with no assistance.       Critical Care  None          Garth Ziegler MD    Attending Emergency Medicine Physician              Oly Zuniga MD  08/07/21 2180

## 2021-08-07 NOTE — ED PROVIDER NOTES
[facility] ED  Emergency Department Encounter  EmergencyMedicine Resident     Pt Emily Harry  MRN: [id number]  Armstrongfpedro 1955  Date of evaluation: 8/7/21  PCP:  Jessica Cushing, APRN    CHIEF COMPLAINT       Chief Complaint   Patient presents with    Chronic Pain    Extremity Weakness       HISTORY OF PRESENT ILLNESS  (Location/Symptom, Timing/Onset, Context/Setting, Quality, Duration, Modifying Factors, Severity.)    This patient was evaluated in the Emergency Department for symptoms described in the history of present illness. He/she was evaluated in the context of the global COVID-19 pandemic, which necessitated consideration that the patient might be at risk for infection with the SARS-CoV-2 virus that causes COVID-19. Institutional protocols and algorithms that pertain to the evaluation of patients at risk for COVID-19 are in a state of rapid change based on information released by regulatory bodies including the CDC and federal and state organizations. These policies and algorithms were followed during the patient's care in the ED. Bigg Byrd is a 72 y.o. female who presents to the ED today with with a history of chronic back pain, degenerative joint disease, sciatica presenting the emergency department today with acute on chronic pain involving back, right leg and right shoulder. Denies any falls or traumatic injuries. All pain is chronic but acutely worse. Does not take anything at home. Recently establish care with new PCP and stated that she supposed to go obtain outpatient lab work as well as follow-up with neurology and pain clinic as outpatient. Recently prescribed Robaxin but has not yet started. No improvement lidocaine patches at home. Back pain is diffuse but most severe in right lower back region with sciatica going down right leg. No history of malignancy or anticoagulation use. No saddle anesthesias.   Additionally patient complaining of mild right ear discomfort. Is concerned there may be a insect in her ear. No drainage or bleeding. No hearing loss. PAST MEDICAL / SURGICAL / SOCIAL / FAMILY HISTORY      has a past medical history of Emphysema (subcutaneous) (surgical) resulting from a procedure, GERD (gastroesophageal reflux disease), and H. pylori infection. has no past surgical history on file. Social History     Socioeconomic History    Marital status: Single     Spouse name: Not on file    Number of children: Not on file    Years of education: Not on file    Highest education level: Not on file   Occupational History    Not on file   Tobacco Use    Smoking status: Current Some Day Smoker     Packs/day: 0.50     Types: Cigarettes    Smokeless tobacco: Never Used   Substance and Sexual Activity    Alcohol use: Never    Drug use: Never    Sexual activity: Not on file   Other Topics Concern    Not on file   Social History Narrative    Not on file     Social Determinants of Health     Financial Resource Strain:     Difficulty of Paying Living Expenses:    Food Insecurity:     Worried About Running Out of Food in the Last Year:     [address] in the Last Year:    Transportation Needs:     Lack of Transportation (Medical):  Lack of Transportation (Non-Medical):    Physical Activity:     Days of Exercise per Week:     Minutes of Exercise per Session:    Stress:     Feeling of Stress :    Social Connections:     Frequency of Communication with Friends and Family:     Frequency of Social Gatherings with Friends and Family:     Attends Denominational Services:     Active Member of Clubs or Organizations:     Attends Club or Organization Meetings:     Marital Status:    Intimate Partner Violence:     Fear of Current or Ex-Partner:     Emotionally Abused:     Physically Abused:     Sexually Abused:        History reviewed. No pertinent family history.     Allergies:  Gadolinium, Iodides, Ketorolac, Metronidazole, Propoxyphene, Tetanus-diphtheria toxoids td, Tuberculin tests, Benadryl [diphenhydramine], Decadron [dexamethasone], Gadolinium derivatives, Iodinated diagnostic agents, Iodine, Penicillins, Tetanus toxoids, and Other    Home Medications:  Prior to Admission medications    Medication Sig Start Date End Date Taking? Authorizing Provider   acetaminophen (TYLENOL) 500 MG tablet Take 1 tablet by mouth every 6 hours as needed for Pain 8/7/21 8/27/21 Yes Renetta Saeed,    lidocaine (LIDODERM) 5 % Place 1 patch onto the skin daily for 10 days 12 hours on, 12 hours off. 8/7/21 8/17/21 Yes Renetta Saeed DO   ibuprofen (ADVIL;MOTRIN) 600 MG tablet Take 1 tablet by mouth every 6 hours as needed for Pain 8/7/21  Yes Renetta Saeed DO   tiZANidine (ZANAFLEX) 2 MG tablet Take 1 tablet by mouth 3 times daily as needed (Muscle spasm) 5/19/21   Ale Villalobos MD   gabapentin (NEURONTIN) 300 MG capsule Take 1 capsule by mouth 2 times daily for 30 days. 4/10/21 5/10/21  Renzo Terviño MD   famotidine (PEPCID) 40 MG tablet Take 1 tablet by mouth daily 4/10/21   Renzo Treviño MD   ALPRAZolam Belvin Blare) 0.25 MG tablet Take 1 tablet by mouth nightly as needed for Sleep. 4/10/21 4/10/22  Renzo Treviño MD   albuterol sulfate HFA (VENTOLIN HFA) 108 (90 Base) MCG/ACT inhaler Inhale 2 puffs into the lungs 4 times daily as needed for Wheezing 4/10/21   Renzo Treviño MD   Elastic Bandages & Supports (LUMBAR BACK BRACE/SUPPORT PAD) MISC 1 Units by Does not apply route as needed (PNEUMATIC OFFLOADING BACK BRACE) 12/11/20 12/11/21  Joshua Marcelo MD       REVIEW OF SYSTEMS    (2-9 systems for level 4, 10 or more for level 5)      Review of Systems   Constitutional: Negative for chills and fever. HENT: Negative for congestion. Eyes: Negative for visual disturbance. Respiratory: Negative for cough, shortness of breath and wheezing. Cardiovascular: Negative for chest pain.    Gastrointestinal: Negative for abdominal pain, nausea and vomiting. Genitourinary: Negative for dysuria and hematuria. Musculoskeletal: Positive for back pain and myalgias. Negative for neck pain and neck stiffness. Skin: Negative for rash. Neurological: Positive for numbness. Negative for dizziness and headaches. PHYSICAL EXAM   (up to 7 for level 4, 8 or more for level 5)      INITIAL VITALS:   /89   Pulse 71   Temp 98.7 °F (37.1 °C) (Oral)   Resp 16   SpO2 97%     Physical Exam  Constitutional:       General: She is not in acute distress. Appearance: Normal appearance. She is not toxic-appearing. HENT:      Head: Normocephalic and atraumatic. Right Ear: Tympanic membrane, ear canal and external ear normal.      Left Ear: Tympanic membrane, ear canal and external ear normal.   Eyes:      Conjunctiva/sclera: Conjunctivae normal.   Neck:      Comments: Negative Spurling  Cardiovascular:      Rate and Rhythm: Normal rate and regular rhythm. Pulses: Normal pulses. Pulmonary:      Effort: Pulmonary effort is normal. No respiratory distress. Breath sounds: No stridor. No wheezing, rhonchi or rales. Abdominal:      General: There is no distension. Palpations: Abdomen is soft. Tenderness: There is no abdominal tenderness. There is no guarding or rebound. Musculoskeletal:         General: No deformity. Cervical back: Normal range of motion and neck supple. No tenderness. No muscular tenderness. Comments: Diffuse lumbar tenderness most significant right paraspinal region. No point tenderness. Increased right-sided back pain and right buttocks pain with right-sided straight leg raise but no radiculopathy. Mild anterior right shoulder pain with slightly limited range of motion past 90 degrees of abduction and flexion secondary to shoulder pain. Neurovascularly intact. Skin:     General: Skin is warm and dry. Findings: No rash. Neurological:      General: No focal deficit present. Mental Status: She is alert and oriented to person, place, and time. Sensory: No sensory deficit. Motor: No weakness. Comments: 5/5 strength in lower extremities   Psychiatric:         Behavior: Behavior normal.         DIFFERENTIAL  DIAGNOSIS     PLAN (LABS / IMAGING / EKG):  Orders Placed This Encounter   Procedures    CBC Auto Differential    Comprehensive Metabolic Panel w/ Reflex to MG    TSH with Reflex    Troponin    EKG 12 Lead       MEDICATIONS ORDERED:  Orders Placed This Encounter   Medications    acetaminophen (TYLENOL) tablet 1,000 mg    DISCONTD: ibuprofen (ADVIL;MOTRIN) 600 MG tablet     Sig: Take 1 tablet by mouth every 6 hours as needed for Pain     Dispense:  120 tablet     Refill:  5    acetaminophen (TYLENOL) 500 MG tablet     Sig: Take 1 tablet by mouth every 6 hours as needed for Pain     Dispense:  20 tablet     Refill:  0    lidocaine (LIDODERM) 5 %     Sig: Place 1 patch onto the skin daily for 10 days 12 hours on, 12 hours off.      Dispense:  10 patch     Refill:  0    ibuprofen (ADVIL;MOTRIN) 600 MG tablet     Sig: Take 1 tablet by mouth every 6 hours as needed for Pain     Dispense:  20 tablet     Refill:  0         DIAGNOSTIC RESULTS / EMERGENCY DEPARTMENT COURSE / MDM     Results for orders placed or performed during the hospital encounter of 08/07/21   CBC Auto Differential   Result Value Ref Range    WBC 6.2 3.5 - 11.3 k/uL    RBC 4.31 3.95 - 5.11 m/uL    Hemoglobin 12.3 11.9 - 15.1 g/dL    Hematocrit 37.5 36.3 - 47.1 %    MCV 87.0 82.6 - 102.9 fL    MCH 28.5 25.2 - 33.5 pg    MCHC 32.8 28.4 - 34.8 g/dL    RDW 14.0 11.8 - 14.4 %    Platelets 414 704 - 192 k/uL    MPV 10.1 8.1 - 13.5 fL    NRBC Automated 0.0 0.0 per 100 WBC    Differential Type NOT REPORTED     Seg Neutrophils 37 36 - 65 %    Lymphocytes 54 (H) 24 - 43 %    Monocytes 7 3 - 12 %    Eosinophils % 1 1 - 4 %    Basophils 1 0 - 2 %    Immature Granulocytes 0 0 %    Segs Absolute 2.29 1.50 - 8.10 k/uL    Absolute Lymph # 3.37 1.10 - 3.70 k/uL    Absolute Mono # 0.46 0.10 - 1.20 k/uL    Absolute Eos # 0.06 0.00 - 0.44 k/uL    Basophils Absolute 0.04 0.00 - 0.20 k/uL    Absolute Immature Granulocyte <0.03 0.00 - 0.30 k/uL    WBC Morphology NOT REPORTED     RBC Morphology NOT REPORTED     Platelet Estimate NOT REPORTED    Comprehensive Metabolic Panel w/ Reflex to MG   Result Value Ref Range    Glucose 80 70 - 99 mg/dL    BUN 10 8 - 23 mg/dL    CREATININE 0.65 0.50 - 0.90 mg/dL    Bun/Cre Ratio NOT REPORTED 9 - 20    Calcium 8.9 8.6 - 10.4 mg/dL    Sodium 140 135 - 144 mmol/L    Potassium 3.9 3.7 - 5.3 mmol/L    Chloride 107 98 - 107 mmol/L    CO2 22 20 - 31 mmol/L    Anion Gap 11 9 - 17 mmol/L    Alkaline Phosphatase 59 35 - 104 U/L    ALT 7 5 - 33 U/L    AST 15 <32 U/L    Total Bilirubin 0.43 0.3 - 1.2 mg/dL    Total Protein 7.0 6.4 - 8.3 g/dL    Albumin 4.1 3.5 - 5.2 g/dL    Albumin/Globulin Ratio 1.4 1.0 - 2.5    GFR Non-African American >60 >60 mL/min    GFR African American >60 >60 mL/min    GFR Comment          GFR Staging NOT REPORTED    TSH with Reflex   Result Value Ref Range    TSH 0.71 0.30 - 5.00 mIU/L   Troponin   Result Value Ref Range    Troponin, High Sensitivity <6 0 - 14 ng/L    Troponin T NOT REPORTED <0.03 ng/mL    Troponin Interp NOT REPORTED          RADIOLOGY:  No orders to display        EKG  No acute ST-T wave changes    All EKG's are interpreted by the Emergency Department Physician who either signs or Co-signs this chart in the absence of a cardiologist.    IMPRESSION/MDM/EMERGENCY DEPARTMENT COURSE:  Patient came to emergency department, HPI and physical exam were conducted. All nursing notes were reviewed. 75-year-old female present emergency department with complaints of acute on chronic back pain, sciatica, right shoulder pain. Recently seen by PCP and given Robaxin which she has not yet taken.   Structured to follow-up with neurology as well as pain clinic, has scheduled appointment soon has not yet been into see them. Also specific outpatient labs performed including CBC, CMP, EKG, TSH. Vitals within normal limits. Patient no acute distress peers not appear acutely ill or toxic. Suspect acute on chronic back pain with no recent traumatic injuries and no red flag signs/symptoms including no anticoagulation, no history of malignancy, no recent injections, no history of IV drug use do not feel that imaging is indicated at this time. There are no signs or symptoms of cauda equina syndrome. Patient able to ambulate has cane for stability. Think it is reasonable to obtain basic work-up the patient was was obtained from PCP including CBC and CMP. Given new onset of right shoulder pain concerned that this could be anginal equivalent also obtain troponin and EKG; given reproducibility there is very low concern for ACS at this time. If work-up negative plan for discharge home with conservative treatment. None    ED Course as of Aug 07 1818   Sat Aug 07, 2021   1459 Troponin, High Sensitivity: <6 [ZT]      ED Course User Index  [ZT] Joaquin Puga, DO     Troponin less than 6. Given duration of symptoms and low heart score does not need further work-up at this time. Blood work grossly unremarkable with no acute abnormalities. Patient updated on results. She is agreeable to try multimodal therapy including alternate dose of Tylenol Motrin, lidocaine patches and Robaxin. Instructed to follow-up with PCP as well as pain clinic as previously discussed with her PCP. She is agreeable. All questions answered. Strict return precautions provided. Discharged home    FINAL IMPRESSION      1. Chronic right-sided low back pain with sciatica, sciatica laterality unspecified    2.  Acute pain of right shoulder          DISPOSITION / PLAN     DISPOSITION Decision To Discharge 08/07/2021 03:56:20 PM      PATIENT REFERRED TO:  LORRAINE Langley  [address] [place] [address]  [phone]    Schedule an appointment as soon as possible for a visit       [facility] ED  [address] [place] [address]  [phone]    As needed, If symptoms worsen      DISCHARGE MEDICATIONS:  Discharge Medication List as of 8/7/2021  3:56 PM      START taking these medications    Details   lidocaine (LIDODERM) 5 % Place 1 patch onto the skin daily for 10 days 12 hours on, 12 hours off., Disp-10 patch, R-0Print             Mariia Arredondo,   Emergency Medicine Resident    (Please note that portions of thisnote were completed with a voice recognition program.  Efforts were made to edit the dictations but occasionally words are mis-transcribed.)       Mariia Arredondo DO  Resident  08/07/21 Tanya Ortez

## 2021-08-09 LAB
EKG ATRIAL RATE: 70 BPM
EKG P AXIS: 75 DEGREES
EKG P-R INTERVAL: 170 MS
EKG Q-T INTERVAL: 402 MS
EKG QRS DURATION: 84 MS
EKG QTC CALCULATION (BAZETT): 434 MS
EKG R AXIS: 17 DEGREES
EKG T AXIS: 39 DEGREES
EKG VENTRICULAR RATE: 70 BPM

## 2021-08-09 PROCEDURE — 93010 ELECTROCARDIOGRAM REPORT: CPT | Performed by: INTERNAL MEDICINE

## 2021-08-18 ENCOUNTER — HOSPITAL ENCOUNTER (OUTPATIENT)
Dept: PHYSICAL THERAPY | Age: 66
Setting detail: THERAPIES SERIES
Discharge: HOME | End: 2021-08-18
Payer: MEDICARE

## 2021-08-18 PROCEDURE — G0283 ELEC STIM OTHER THAN WOUND: HCPCS

## 2021-08-18 PROCEDURE — 97161 PT EVAL LOW COMPLEX 20 MIN: CPT

## 2021-08-18 NOTE — CONSULTS
[x] [facility]) - [facility] TWELVESTEP [facility] &  Therapy  [address]  [phone]  F: [phone] [] [address]  [facility]   [address]  P: [phone]  F: [phone] [] [address] &  Therapy  [address]  P: [phone]  F: [phone] [] [address]  P: [phone]  F: [phone] [] [address]  [facility]   [address]   [place]: [phone]  F: [phone]      Physical Therapy Spine Evaluation    Date:  2021  Patient: Rodney Chung  : 1955  MRN: [id number]  Physician: Marilyn Byrne NP            Insurance: [facility], Medicaid (Auth after eval)  Medical Diagnosis: Spinal stenosis of lumbar region (M48.061)    Rehab Codes: M54.5, M79.604, M79.605, M25.60, M62.81  Onset Date: 21  Next 's appt.:    Subjective:   CC: Constant pain down her back into her right leg and also into her left leg; + sleep disturbances; pt very tearful stating that she cannot live with this pain anymore and that she is unable to do anything because of it  HPI: (onset date):  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 Pt states that her lumbar spine is the problem and that she was scheduled for surgery but the surgeon ending up deciding to stop the surgery after she was already on the table because she was so small and underweight due to other medical issues that he did not feel she would survive the surgery. Pt states that she moved here about a year ago and was living in a shelter. Pt continues to have severe pain to the point that she can barely function anymore.   Pt states that she has an appt with Dr. Loy Copeland (neurosurgeon) tomorrow morning and is also trying to get a pain management appt scheduled. PMHx: [] Unremarkable [] Diabetes [] HTN  [] Pacemaker   [] MI/Heart Problems [] Cancer [] Arthritis [] Other:              [x] Refer to full medical chart  In EPIC       Comorbidities:   [] Obesity [] Dialysis  [] N/A   [] Asthma/COPD [] Dementia [] Other:   [] Stroke [] Sleep apnea [] Other:   [] Vascular disease [] Rheumatic disease [] Other:     Tests: [x] X-Ray: 5/19/21 Lumbar spine  No acute bony abnormalities are noted       Mild spondylosis and degenerative disc disease.       Facet arthropathy     5/19/21 Thoracic --No acute osseous abnormality.  Mild multilevel degenerative changes. [x] MRI: 7/5/21- Mild lumbar spondylosis. [] Other:    Medications: [x] Refer to full medical record [] None [] Other:  Allergies:      [x] Refer to full medical record [] None [] Other:    Function:  Hand Dominance  [] Right  [] Left  Patient lives with: Rents a room in a house   In what type of home []  One story   [x] Two story   [] Split level   Number of stairs to enter 3   With handrail on the []  Right to enter   [] Left to enter   Bathroom has a []  Tub only  [x] Tub/shower combo   [] Walk in shower    []  Grab bars   Washing machine is on []  Main level   [] Second level   [] Basement   Employer    Job Status []  Normal duty   [] Light duty   [] Off due to condition    []  Retired   [] Not employed   [x] Disability  [] Other:  []  Return to work:    Work activities/duties Part time at [employer], M,T,Th,F 1-5       ADL/IADL Previous level of function Current level of function Who currently assists the patient with task   Bathing  [x] Independent  [] Assist [x] Independent  [] Assist    Dress/grooming [x] Independent  [] Assist [x] Independent  [] Assist    Transfer/mobility [x] Independent  [] Assist [x] Independent  [] Assist    Feeding [x] Independent  [] Assist [x] Independent  [] Assist    Toileting [x] Independent  [] Assist [x] Independent  [] Assist    Driving [x] Independent  [] Assist [x] Independent  [] Assist    Housekeeping [x] Independent  [] Assist [] Independent  [x] Assist Family   Grocery shop/meal prep [x] Independent  [] Assist [] Independent  [x] Assist Family     Gait Prior level of function Current level of function    [x] Independent  [] Assist [x] Independent  [] Assist   Device: [] Independent [] Independent    [] Straight Cane [] Quad cane [] Straight Cane [] Quad cane    [] Standard walker [] Rolling walker   [] 4 wheeled walker [] Standard walker [] Rolling walker   [] 4 wheeled walker    [] Wheelchair [] Wheelchair     Pain:  [x] Yes  [] No Location: Low back, B LE's Pain Rating: (0-10 scale) 9/10  Pain altered Tx:  [x] Yes  [] No  Action: limited assessment to the pt's tolerance, heat and estim for pain relief    Symptoms:  [] Improving [x] Worsening [] Same  Better:  [] AM    [] PM    [] Sit    [] Rise/Sit    []Stand    [] Walk    [] Lying    [] Other:  Worse: [] AM    [] PM    [] Sit    [] Rise/Sit    [x]Stand    [x] Walk    [] Lying    [x] Bend                      [] Valsalva    [] Other:  Sleep: [] OK    [x] Disturbed    Objective:      STRENGTH  STRENGTH  ROM    Left Right  Left Right Cervical    C5 Shld Abd   L1-2 Hip Flex 3+ 3+ Flexion    Shld Flexion   Hip Abd   Extension    Shld IR   L3-4 Knee Ext 3+ 3+ Rotation L R   Shld ER   L4 Ankle DF 3+ 3+ Sidebend L R   C6 Elb Flex   L5 EHL   Retraction    C7 Elb Ext   S1 Plant. Flex 3+ 3+ Lumbar    C8 EPL   Abdominals   Flexion 75% limited   T1 Fing Abd   Erector Spinae   Extension 75% limited         Rotation L 50% limited R 50% limited         Sidebend L R         UE/LE                                                              TESTS (+/-) LEFT RIGHT Not Tested   SLR [] sit [x] supine + - []   Hamstring (SLR) + + []   SKTC + + []   DKTC   []   Slump/Dural   []   SI JT   []   JHONATHAN   []   Joint Mobility   []   Cerv. Comp   []   Cerv. Distraction   []   Cerv.  Alar/Transverse   []   Vertebral Artery   []   Adsons   []   Kenya Vang   []   Jennifer Tests ? Pain ? Pain No Change Not Tested   RFIS [] [] [] [x]   JANNETTE [] [] [] [x]   RFIL [] [] [] [x]   REIL [] [] [] [x]   Rep Prot [] [] [] []   Rep Retract [] [] [] []   **Pt unable to tolerate repeated testing this date    OBSERVATION No Deficit Deficit Not Tested Comments   Posture       Forward Head [] [] []    Rounded Shoulders [] [] []    Kyphosis [] [] []    Lordosis [] [x] [] Decreased lumbar lordosis   Lateral Shift [] [] []    Scoliosis [] [] []    Iliac Crest [] [] []    PSIS [] [] []    ASIS [] [] []    Genu Valgus [] [] []    Genu Varus [] [] []    Genu Recurvatum [] [] []    Pronation [] [] []    Supination [] [] []    Leg Length Discrp [] [] []    Slumped Sitting [] [] []    Palpation [] [x] [] Pt very tender to light palpation of the lumbar paraspinals   Sensation [] [x] [] Reports numbness and tingling in bilateral hands   Edema [] [] []    Neurological [] [] []        Functional Test: Oswestry  Score: 78% functionally impaired     Comments: Pt became very tearful throughout the assessment due to high levels of pain. Assessment:  Patient would benefit from skilled physical therapy services in order to: address severely limited lumbar ROM, weakness of the core and LE's, abnormal posture (reduced lumbar lordosis), and functional impairments including difficulty with all ADL's. Problems:    [x] ? Pain:  [x] ? ROM:  [x] ? Strength:  [x] ? Function:  [] Other:      STG: (to be met in 8 treatments)  1. ? Pain: Decrease low back and LE pain to 7/10   2. ? ROM: Increase lumbar flexion to 50% limited, extension and rotation bilaterally to 25% limited  3. ? Strength: Increase core strength as demonstrated by progression of DLS exercises  4. ? Function: Improve Oswestry to 65% impairment  5. Patient to be independent with home exercise program as demonstrated by performance with correct form without cues.   6. Demonstrate Knowledge of fall prevention  LTG: (to be met in 12 treatments)  1. Increase lumbar ROM to wfl with minimal pain  2. Pt will report improvement in sleep to being reduced by less than 1/4  3. Improve Oswestry to 50% impairment      Patient goals: Hoping for relief of pain. Rehab Potential:  [x] Good  [] Fair  [] Poor   Suggested Professional Referral:  [x] No  [] Yes:  Barriers to Goal Achievement:  [] No  [x] Yes: decreased tolerance to exercises  Domestic Concerns:  [x] No  [] Yes:    Pt. Education:  [x] Plans/Goals, Risks/Benefits discussed  [] Home exercise program--Discussed PT POC with the pt, no exercises provided today as pt's insurance requires auth  Method of Education: [x] Verbal  [] Demo  [] Written  Comprehension of Education:  [x] Katie Kline understanding. [] Demonstrates understanding. [] Needs Review. [] Demonstrates/verbalizes understanding of HEP/Ed previously given. Treatment Plan:  [x] Therapeutic Exercise   60420  [] Iontophoresis: 4 mg/mL Dexamethasone Sodium Phosphate  mAmin  72881   [x] Therapeutic Activity  26655 [] Vasopneumatic cold with compression  40911    [] Gait Training   12609 [] Ultrasound   63323   [] Neuromuscular Re-education  95984 [x] Electrical Stimulation Unattended  89694   [x] Manual Therapy  34150 [] Electrical Stimulation Attended  21678   [x] Instruction in HEP  [] Lumbar/Cervical Traction  91050   [] Aquatic Therapy   83072 [x] Cold/hotpack    [] Massage   25473      [] Dry Needling, 1 or 2 muscles  20469   [] Biofeedback, first 15 minutes   18858  [] Biofeedback, additional 15 minutes   54920 [] Dry Needling, 3 or more muscles  15708     []  Medication allergies reviewed for use of    Dexamethasone Sodium Phosphate 4mg/ml     with iontophoresis treatments. Pt is not allergic.     Frequency:  2 x/week for 12 visits    Todays Treatment:  Modalities: 1 large HP, UES (IFC) to lower back, pt supine,x 20 minutes  Precautions:  Exercises:  Exercise Reps/ Time Weight/ Level

## 2021-08-18 NOTE — FLOWSHEET NOTE
[place] Fall Risk Assessment    Patient Name:  Lyudmila Bartholomew  : 1955        Risk Factor Scale  Score   History of Falls [x] Yes  [] No 25  0 25   Secondary Diagnosis [] Yes  [x] No 15  0 0   Ambulatory Aid [] Furniture  [] Crutches/cane/walker  [x] None/bedrest/wheelchair/nurse 30  15  0 0   IV/Heparin Lock [] Yes  [x] No 20  0 0   Gait/Transferring [] Impaired  [x] Weak  [] Normal/bedrest/immobile 20  10  0 10   Mental Status [] Forgets limitations  [x] Oriented to own ability 15  0 0      Total: 35     Based on the Assessment score: check the appropriate box.     []  No intervention needed   Low =   Score of 0-24    [x]  Use standard prevention interventions Moderate =  Score of 24-44   [x] Give patient handout and discuss fall prevention strategies   [x] Establish goal of education for patient/family RE: fall prevention strategies    []  Use high risk prevention interventions High = Score of 45 and higher   [] Give patient handout and discuss fall prevention strategies   [] Establish goal of education for patient/family Re: fall prevention strategies   [] Discuss lifeline / other resources    Electronically signed by:   Jose Sim PT  Date: 2021

## 2021-08-24 NOTE — PRE-CERTIFICATION NOTE
[x] [facility]) [facility] &  Therapy  [address]  [phone]  F: [phone] [] [address] [facility] [address]   [address]  P: [phone]  F: [phone] [] [place]  [address] [facility]  P: [phone]  F: [phone] [] [address]  [facility]   [address]   [place]: [phone]  F: [phone]  [x] [facility]   Outpatient Occupational Therapy  [address]: [phone]  F: [phone]          Therapy Pre-certification Note      8/24/2021    Xiomara Speaker  1955   [id number]      Insurance approval was received for Physical Therapy from [facility] on 8/24/21. Approval was received for 12 visits, from 8/23/21 to 11/20/21. Authorization number [id number].    [id number]  [id number]  [id number]  [id number]    Patient was contacted to be scheduled and pt is scheduled for 8/25/21.       Electronically signed by Bailey Muir PT on 8/24/2021 at 3:40 PM

## 2021-08-25 ENCOUNTER — HOSPITAL ENCOUNTER (OUTPATIENT)
Dept: PHYSICAL THERAPY | Age: 66
Setting detail: THERAPIES SERIES
Discharge: HOME | End: 2021-08-25
Payer: MEDICARE

## 2021-08-25 PROCEDURE — 97110 THERAPEUTIC EXERCISES: CPT

## 2021-08-25 PROCEDURE — G0283 ELEC STIM OTHER THAN WOUND: HCPCS

## 2021-08-25 NOTE — FLOWSHEET NOTE
[x] [facility] &  Therapy  [address]  [phone]  F: [phone] [] [address]  [facility]   [address]  P: [phone]  F: [phone] [] [address]  [address]  P: [phone]  F: [phone] [] [address]  P: [phone]  F: [phone] [] [address]  [facility]   [address]   [place]: [phone]  F: [phone]      Physical Therapy Daily Treatment Note    Date:  2021  Patient Name:  Ignacia Martinez    :  1955  MRN: [id number]  Physician: Dajuan Villagomez NP                                  Insurance: [facility], Medicaid (Auth after eval)  Medical Diagnosis: Spinal stenosis of lumbar region (M48.061)                  Rehab Codes: M54.5, M79.604, M79.605, M25.60, M62.81  Onset Date: 21                 Next 's appt. :  Visit# / total visits: ; Progress note for Medicare patient due at visit      Cancels/No Shows: 0/0    Subjective:    Pain:  [x] Yes  [] No Location: low back Pain Rating: (0-10 scale) 9/10  Pain altered Tx:  [] No  [x] Yes  Action: hot pack/estim  Comments: Pt arrives to therapy reporting very high pain levels but states she is feeling well enough to participate in therapy. Pt reports some relief of pain after hot pack and e-stim last session.     Objective:  Modalities:   Treatment Location  Left      Right                          Position    [x]          [x]  [x] Supine    [] Prone   [] Side lying  [] Sitting          Treatment Modality    Hot Pack:    [x] Large   [] Medium    [] Cervical     25 min    Electrical Stim:    [x] IFC     [] MFAC      [] HVPC                          Protocol:_______  _____X 25 min                          #Channels: impairment     Patient goals: Hoping for relief of pain    Pt. Education:  [x] Yes  [] No  [x] Reviewed Prior HEP/Ed  Method of Education: [x] Verbal  [x] Demo  [] Written  Comprehension of Education:  [x] Verbalizes understanding. [] Demonstrates understanding. [x] Needs review. [] Demonstrates/verbalizes HEP/Ed previously given. Plan: [x] Continue current frequency toward long and short term goals.     [x] Specific Instructions for subsequent treatments: Continue tx per POC      Time In: 1:45pm            Time Out: 1:40 pm    Electronically signed by:  Johny Senior PTA

## 2021-08-27 ENCOUNTER — HOSPITAL ENCOUNTER (OUTPATIENT)
Dept: PHYSICAL THERAPY | Age: 66
Setting detail: THERAPIES SERIES
Discharge: HOME | End: 2021-08-27
Payer: MEDICARE

## 2021-10-05 ENCOUNTER — HOSPITAL ENCOUNTER (OUTPATIENT)
Dept: PHYSICAL THERAPY | Age: 66
Setting detail: THERAPIES SERIES
Discharge: HOME | End: 2021-10-05
Payer: MEDICARE

## 2021-10-05 PROCEDURE — 97110 THERAPEUTIC EXERCISES: CPT

## 2021-10-05 PROCEDURE — G0283 ELEC STIM OTHER THAN WOUND: HCPCS
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
## 2021-10-08 ENCOUNTER — HOSPITAL ENCOUNTER (OUTPATIENT)
Dept: PHYSICAL THERAPY | Age: 66
Setting detail: THERAPIES SERIES
Discharge: HOME | End: 2021-10-08
Payer: MEDICARE

## 2021-10-08 PROCEDURE — 97110 THERAPEUTIC EXERCISES: CPT

## 2021-10-08 PROCEDURE — G0283 ELEC STIM OTHER THAN WOUND: HCPCS

## 2021-10-13 ENCOUNTER — HOSPITAL ENCOUNTER (OUTPATIENT)
Dept: PHYSICAL THERAPY | Age: 66
Setting detail: THERAPIES SERIES
Discharge: HOME | End: 2021-10-13
Payer: MEDICARE

## 2021-10-13 PROCEDURE — G0283 ELEC STIM OTHER THAN WOUND: HCPCS

## 2021-10-13 PROCEDURE — 97110 THERAPEUTIC EXERCISES: CPT

## 2021-10-13 NOTE — FLOWSHEET NOTE
[x] [facility]) [facility] &  Therapy  [address]  [phone]  F: [phone] [] [address]  [address]   [address]  P: [phone]  F: [phone] [] [address] &  Therapy  [address] [facility]  P: [phone]  F: [phone] [] [address]  P: [phone]  F: [phone] [] [address]  [facility]   [address]   [place]: [phone]  F: [phone]      Physical Therapy Daily Treatment Note    Date:  10/13/2021  Patient Name:  Lotus Lees    :  1955  MRN: [id number]  Physician: Nahum Marie NP                                    Insurance: [facility], Medicaid (12 visits, from 21 to 21)  Medical Diagnosis: Spinal stenosis of lumbar region (M48.061)                  Rehab Codes: M54.5, M79.604, M79.605, M25.60, M62.81  Onset Date: 21                 Next 's appt. :    Visit# / total visits: ; Progress note for Medicare patient due at visit 10        Cancels/No Shows:     Subjective:    Pain:  [x] Yes  [] No Location: low back Pain Rating: (0-10 scale) 10/10  Pain altered Tx:  [] No  [x] Yes  Action: hot pack/estim  Comments:  Patient reports her pain this morning was a 6/10. Notes she had to walk a mile from the parking lot to the lobby. Has been working more at work. Stands on feet while at work for up to 4 hr shifts.            Objective:  Modalities: First, keep on while doing stretches  Treatment Location  Left      Right                          Position    [x]          [x]  [x] Supine    [] Prone   [] Side lying  [] Sitting          Treatment Modality    Hot Pack:    [x] Large   [] Medium    [] Cervical     20 min    Electrical Stim:    [x] IFC     [] MFAC      [] HVPC Protocol:_______  _____X 20 min                          #Channels:  [x] 1        [x] 2       [] Other:    Other:         Precautions:  Exercises:  Exercise Reps/ Time Weight/ Level Comments         Standing      Incline stretch 3x20\"     Heel raises 10x     Marches 10x           Seated      LAQ 10x ea     Hamstring stretch 3x20\"                 Supine      LTR 10x10\"     Louetta Karol 10\"    PPT 15x  Draw-ins only 10/5/21   PPT with march 15x     PPT w/ SAME arm/leg lift 15x  Added 10.13   DKTC 10x10\"     Isometric add contraction   Pillow between thighs   glute sets Too painful     SLR      Bridge 10x  Added 10.8   Hip add 15x  Added 10.8               Other:      Treatment Charges: Mins Units   [x]  Modalities HP/estim 20 1   [x]  Ther Exercise 39 3   []  Manual Therapy     []  Ther Activities     []  Aquatics     []  Vasocompression     []  Other     Total Treatment time 59 4       Assessment: [x] Progressing toward goals. Initiated standing exercises with fair tolerance. Heel raises and marches added for LE strength with patient noting increase LB pain. Additional time required for slow cautious bed mobility on mat and from sit to stands. Pain relief with PPT and progression of DLS for core strengthening. Ended with IFC/HP to low back. [] No change. [x] Other:  Patient's tolerance improves with verbal distraction. [x] Patient would continue to benefit from skilled physical therapy services in order to:  address severely limited lumbar ROM, weakness of the core and LE's, abnormal posture (reduced lumbar lordosis), and functional impairments including difficulty with all ADL's. STG: (to be met in 8 treatments)  1. ? Pain: Decrease low back and LE pain to 7/10   2. ? ROM: Increase lumbar flexion to 50% limited, extension and rotation bilaterally to 25% limited  3. ? Strength:  Increase core strength as demonstrated by progression of DLS exercises  4. ? Function: Improve Oswestry to 65% impairment  5. Patient to be independent with home exercise program as demonstrated by performance with correct form without cues. 6. Demonstrate Knowledge of fall prevention  LTG: (to be met in 12 treatments)  1. Increase lumbar ROM to wfl with minimal pain  2. Pt will report improvement in sleep to being reduced by less than 1/4  3. Improve Oswestry to 50% impairment     Patient goals: Hoping for relief of pain    Pt. Education:  [x] Yes  [] No  [x] Reviewed Prior HEP/Ed  Method of Education: [x] Verbal  [x] Demo  [] Written  Comprehension of Education:   [x] Verbalizes understanding. [x] Demonstrates understanding. [x] Needs review. [x] Demonstrates/verbalizes HEP/Ed previously given. Plan: [x] Continue current frequency toward long and short term goals.     [x] Specific Instructions for subsequent treatments: Continue tx per POC, trial of HP during supine exercises        Time In: 1:00 pm             Time Out: 2:04 pm    Electronically signed by:  Kamala Bergman PTA

## 2021-10-15 ENCOUNTER — HOSPITAL ENCOUNTER (OUTPATIENT)
Dept: PHYSICAL THERAPY | Age: 66
Setting detail: THERAPIES SERIES
Discharge: HOME | End: 2021-10-15
Payer: MEDICARE

## 2021-10-15 PROCEDURE — G0283 ELEC STIM OTHER THAN WOUND: HCPCS

## 2021-10-15 PROCEDURE — 97530 THERAPEUTIC ACTIVITIES: CPT

## 2021-10-15 NOTE — FLOWSHEET NOTE
[x] [facility]) [facility] &  Therapy  [address]  [phone]  F: [phone] [] [address]  [facility]   [address]  P: [phone]  F: [phone] [] [address] &  Therapy  [address] [facility] [address]  P: [phone]  F: [phone] [] [address]  P: [phone]  F: [phone] [] [address]  [facility]   [address]   [place]: [phone]  F: [phone]      Physical Therapy Daily Treatment Note    Date:  10/15/2021  Patient Name:  Abner Thurman    :  1955  MRN: [id number]  Physician: Enrique Jeans, NP                                    Insurance: [facility], Medicaid (12 visits, from 21 to 21)  Medical Diagnosis: Spinal stenosis of lumbar region (M48.061)                  Rehab Codes: M54.5, M79.604, M79.605, M25.60, M62.81  Onset Date: 21                 Next 's appt. :    Visit# / total visits: ; Progress note for Medicare patient due at visit 10        Cancels/No Shows:     Subjective:    Pain:  [x] Yes  [] No Location: low back Pain Rating: (0-10 scale) 10/10  Pain altered Tx:  [] No  [x] Yes  Action: hot pack/estim  Comments:  Patient states her back is not good today and requests to perform estim first due to symptoms.  States rain and colder weather           Objective:  Modalities:   Treatment Location  Left      Right                          Position    [x]          [x]  [x] Supine    [] Prone   [] Side lying  [] Sitting          Treatment Modality    Hot Pack:    [x] Large   [] Medium    [] Cervical     20 min    Electrical Stim:    [x] IFC     [] MFAC      [] HVPC                          Protocol:_______  _____X 20 min                          #Channels:  [x] 1        [x] 2       [] Other: Other:         Precautions:  Exercises: All therapeutic exercises held 10/15  Exercise Reps/ Time Weight/ Level Comments         Standing      Incline stretch 3x20\"     Heel raises 10x     Marches 10x           Seated      LAQ 10x ea     Hamstring stretch 3x20\"                 Supine      LTR 10x10\"     Adela Nicole 10\"    PPT 15x  Draw-ins only 10/5/21   PPT with march 15x     PPT w/ SAME arm/leg lift 15x  Added 10.13   DKTC 10x10\"     Isometric add contraction   Pillow between thighs   glute sets Too painful     SLR      Bridge 10x  Added 10.8   Hip add 15x  Added 10.8               Other:      Treatment Charges: Mins Units   [x]  Modalities HP/estim 15 1   []  Ther Exercise     []  Manual Therapy     []  Ther Activities     []  Aquatics     []  Vasocompression     [x]  Other - There act 15 1   Total Treatment time 30 2       Assessment: [] Progressing toward goals. [] No change. [x] Other: Patient arrived this date noting considerable pain and symptoms. States estim is most beneficial at this time and following estim patient hesitant to continue with therapeutic exercises this date as to not exacerbate symptoms. Patient and therapist discussed symptoms that patient feels most comfortable holding stretches and exercises and trying to go home before work and resting. Also with extensive conversation regarding patient ability to attend therapy, attendance policy, and patient family situation at this time. [x] Patient would continue to benefit from skilled physical therapy services in order to:  address severely limited lumbar ROM, weakness of the core and LE's, abnormal posture (reduced lumbar lordosis), and functional impairments including difficulty with all ADL's. STG: (to be met in 8 treatments)  1. ? Pain: Decrease low back and LE pain to 7/10   2. ? ROM: Increase lumbar flexion to 50% limited, extension and rotation bilaterally to 25% limited  3. ? Strength:  Increase core strength as demonstrated by progression of DLS exercises  4. ? Function: Improve Oswestry to 65% impairment  5. Patient to be independent with home exercise program as demonstrated by performance with correct form without cues. 6. Demonstrate Knowledge of fall prevention  LTG: (to be met in 12 treatments)  1. Increase lumbar ROM to wfl with minimal pain  2. Pt will report improvement in sleep to being reduced by less than 1/4  3. Improve Oswestry to 50% impairment     Patient goals: Hoping for relief of pain    Pt. Education:  [x] Yes  [] No  [x] Reviewed Prior HEP/Ed  Method of Education: [x] Verbal  [x] Demo  [] Written  Comprehension of Education:   [x] Verbalizes understanding. [x] Demonstrates understanding. [x] Needs review. [x] Demonstrates/verbalizes HEP/Ed previously given. Plan: [x] Continue current frequency toward long and short term goals.     [x] Specific Instructions for subsequent treatments: Continue tx per POC, trial of HP during supine exercises        Time In: 855 am             Time Out: 930 am    Electronically signed by:  Angy Squires PTA

## 2021-10-20 ENCOUNTER — HOSPITAL ENCOUNTER (OUTPATIENT)
Dept: PHYSICAL THERAPY | Age: 66
Setting detail: THERAPIES SERIES
Discharge: HOME | End: 2021-10-20
Payer: MEDICARE

## 2021-10-20 PROCEDURE — 97110 THERAPEUTIC EXERCISES: CPT

## 2021-10-20 PROCEDURE — G0283 ELEC STIM OTHER THAN WOUND: HCPCS

## 2021-10-20 NOTE — FLOWSHEET NOTE
[x] [facility] &  Therapy  [address]  [phone]  F: [phone] [] [address]  [facility]   [address]  P: [phone]  F: [phone] [] [address] &  Therapy  [address]  P: [phone]  F: [phone] [] [address]  P: [phone]  F: [phone] [] [address]  [facility]   [address]   [place]: [phone]  F: [phone]      Physical Therapy Daily Treatment Note    Date:  10/20/2021  Patient Name:  Natividad Swanson    :  1955  MRN: [id number]  Physician: Joan Kline NP                                    Insurance: [employer], Medicaid (12 visits, from 21 to 21)  Medical Diagnosis: Spinal stenosis of lumbar region (M48.061)                  Rehab Codes: M54.5, M79.604, M79.605, M25.60, M62.81  Onset Date: 21                 Next 's appt. :    Visit# / total visits: ; Progress note for Medicare patient due at visit 10        Cancels/No Shows:     Subjective:    Pain:  [x] Yes  [] No Location: low back Pain Rating: (0-10 scale) 7/10  Pain altered Tx:  [] No  [x] Yes  Action: hot pack/estim  Comments:  Patient arrives noting she woke up in horrible back pain due to the cold weather. Ran some errands and pain is not better to a 7/10.              Objective:  Modalities:   Treatment Location  Left      Right                          Position    [x]          [x]  [x] Supine    [] Prone   [] Side lying  [] Sitting          Treatment Modality    Hot Pack:    [x] Large   [] Medium    [] Cervical     20 min    Electrical Stim:    [x] IFC     [] MFAC      [] HVPC                          Protocol:_______  _____X 20 min                          #Channels:  [x] 1        [x] 2 [] Other:    Other:         Precautions:  Exercises: All therapeutic exercises held 10/15  Exercise Reps/ Time Weight/ Level Comments         Standing      Incline stretch 3x20\"  Difficult   Heel raises 10x     Marches 10x     Hip abd 10x  Added 10.20         Seated      LAQ 10x ea     Hamstring stretch 3x20\"                 Supine      LTR 10x10\"     SKTC 5xea 10\"    PPT 15x  Draw-ins only 10/5/21   PPT with march 15x 1 lb    PPT w/ SAME arm/leg lift 15x 1 lb ea Added 10.13   DKTC 10x10\"     Isometric add contraction   Pillow between thighs   glute sets Too painful     SLR      Bridge 10x  Added 10.8   Hip add 15x  Added 10.8               Other:      Treatment Charges: Mins Units   [x]  Modalities HP/estim 20 1   [x]  Ther Exercise 43 3   []  Manual Therapy     []  Ther Activities     []  Aquatics     []  Vasocompression     []  Other - There act     Total Treatment time 63 4       Assessment: [x] Progressing toward goals. Started with [employer] for warm up. Added hip abd in standing to increase hip strength with good tolerance. Weights added with DLS for core strengthening with good tolerance. Ended with IFC/HP to low back to decrease pain good relief noted by the patient. [] No change. [] Other:        [x] Patient would continue to benefit from skilled physical therapy services in order to:  address severely limited lumbar ROM, weakness of the core and LE's, abnormal posture (reduced lumbar lordosis), and functional impairments including difficulty with all ADL's. STG: (to be met in 8 treatments)  1. ? Pain: Decrease low back and LE pain to 7/10   2. ? ROM: Increase lumbar flexion to 50% limited, extension and rotation bilaterally to 25% limited  3. ? Strength: Increase core strength as demonstrated by progression of DLS exercises  4. ? Function: Improve Oswestry to 65% impairment  5.  Patient to be independent with home exercise program as demonstrated by performance with correct form without cues. 6. Demonstrate Knowledge of fall prevention  LTG: (to be met in 12 treatments)  1. Increase lumbar ROM to wfl with minimal pain  2. Pt will report improvement in sleep to being reduced by less than 1/4  3. Improve Oswestry to 50% impairment     Patient goals: Hoping for relief of pain    Pt. Education:  [x] Yes  [] No  [x] Reviewed Prior HEP/Ed  Method of Education: [x] Verbal  [x] Demo  [] Written  Comprehension of Education:   [x] Verbalizes understanding. [x] Demonstrates understanding. [x] Needs review. [x] Demonstrates/verbalizes HEP/Ed previously given. Plan: [x] Continue current frequency toward long and short term goals.     [x] Specific Instructions for subsequent treatments: Continue tx per POC, trial of HP during supine exercises        Time In: 1300             Time Out: 1408    Electronically signed by:  Alisa Shelley PTA

## 2021-10-22 ENCOUNTER — HOSPITAL ENCOUNTER (OUTPATIENT)
Dept: PHYSICAL THERAPY | Age: 66
Setting detail: THERAPIES SERIES
Discharge: HOME | End: 2021-10-22
Payer: MEDICARE

## 2021-10-22 PROCEDURE — G0283 ELEC STIM OTHER THAN WOUND: HCPCS

## 2021-10-22 PROCEDURE — 97110 THERAPEUTIC EXERCISES: CPT

## 2021-10-22 NOTE — FLOWSHEET NOTE
[x] [facility]) [facility]  [facility]  [address]  [phone]  F: [phone] [] [address]  [facility]   [address]  P: [phone]  F: [phone] [] [address] &  Therapy  [address]  P: [phone]  F: [phone] [] [address]  P: [phone]  F: [phone] [] [address]  [facility]   [address]   [place]: [phone]  F: [phone]      Physical Therapy Daily Treatment Note    Date:  10/22/2021  Patient Name:  Natividad Swanson    :  1955  MRN: [id number]  Physician: Joan Kline NP                                    Insurance: [employer], Medicaid (12 visits, from 21 to 21)  Medical Diagnosis: Spinal stenosis of lumbar region (M48.061)                  Rehab Codes: M54.5, M79.604, M79.605, M25.60, M62.81  Onset Date: 21                 Next 's appt. :    Visit# / total visits: ; Progress note for Medicare patient due at visit 10        Cancels/No Shows:     Subjective:    Pain:  [x] Yes  [] No Location: low back Pain Rating: (0-10 scale) 8/10  Pain altered Tx:  [] No  [x] Yes  Action: hot pack/estim  Comments: Pt arrives from work noting pain present in LB and B LE's. Objective:  Modalities:   Treatment Location  Left      Right                          Position    [x]          [x]  [x] Supine    [] Prone   [] Side lying  [] Sitting          Treatment Modality    Hot Pack:    [x] Large   [] Medium    [] Cervical     20 min    Electrical Stim:    [x] IFC     [] MFAC      [] HVPC                          Protocol:_______  _____X 20 min                          #Channels:  [x] 1        [x] 2       [] Other:    Other:         Precautions:  Exercises:  All therapeutic Strength: Increase core strength as demonstrated by progression of DLS exercises  4. ? Function: Improve Oswestry to 65% impairment  5. Patient to be independent with home exercise program as demonstrated by performance with correct form without cues. 6. Demonstrate Knowledge of fall prevention  LTG: (to be met in 12 treatments)  1. Increase lumbar ROM to wfl with minimal pain  2. Pt will report improvement in sleep to being reduced by less than 1/4  3. Improve Oswestry to 50% impairment     Patient goals: Hoping for relief of pain    Pt. Education:  [x] Yes  [] No  [x] Reviewed Prior HEP/Ed  Method of Education: [x] Verbal  [x] Demo  [] Written  Comprehension of Education:   [x] Verbalizes understanding. [x] Demonstrates understanding. [x] Needs review. [x] Demonstrates/verbalizes HEP/Ed previously given. Plan: [x] Continue current frequency toward long and short term goals.     [x] Specific Instructions for subsequent treatments: Continue tx per POC, trial of HP during supine exercises        Time In: 5:20 pm        Time Out: 6:30 pm    Electronically signed by:  Maria M Cummins PTA

## 2021-10-27 ENCOUNTER — HOSPITAL ENCOUNTER (OUTPATIENT)
Dept: PHYSICAL THERAPY | Age: 66
Setting detail: THERAPIES SERIES
Discharge: HOME | End: 2021-10-27
Payer: MEDICARE

## 2021-10-29 ENCOUNTER — HOSPITAL ENCOUNTER (OUTPATIENT)
Dept: PHYSICAL THERAPY | Age: 66
Setting detail: THERAPIES SERIES
Discharge: HOME | End: 2021-10-29
Payer: MEDICARE

## 2021-10-29 PROCEDURE — 97110 THERAPEUTIC EXERCISES: CPT

## 2021-10-29 PROCEDURE — G0283 ELEC STIM OTHER THAN WOUND: HCPCS

## 2021-11-03 ENCOUNTER — HOSPITAL ENCOUNTER (OUTPATIENT)
Dept: PHYSICAL THERAPY | Age: 66
Setting detail: THERAPIES SERIES
Discharge: HOME | End: 2021-11-03
Payer: MEDICARE

## 2021-11-03 PROCEDURE — 97110 THERAPEUTIC EXERCISES: CPT

## 2021-11-03 PROCEDURE — G0283 ELEC STIM OTHER THAN WOUND: HCPCS

## 2021-11-03 NOTE — FLOWSHEET NOTE
[x] [facility]) [facility] &  Therapy  [address]  [phone]  F: [phone] [] [address]  [place] [address]   [address]  P: [phone]  F: [phone] [] [address] &  Therapy  [address]  P: [phone]  F: [phone] [] [address]  P: [phone]  F: [phone] [] [address]  [facility]   [address]   [place]: [phone]  F: [phone]      Physical Therapy Daily Treatment Note    Date:  11/3/2021  Patient Name:  Eamon Pineda    :  1955  MRN: [id number]  Physician: Naida Martinez NP                                    Ins[facility]: [facility] [place] [facility] (12 visits, from 21 to 21)  Medical Diagnosis: Spinal stenosis of lumbar region (M48.061)                  Rehab Codes: M54.5, M79.604, M79.605, M25.60, M62.81  Onset Date: 21                 Next 's appt. :    Visit# / total visits: 10/12; Progress note for Medicare patient due at visit 10        Cancels/No Shows:     Subjective:    Pain:  [x] Yes  [] No Location: low back and bilateral legs. Pain Rating: (0-10 scale) 10/10  Pain altered Tx:  [] No  [x] Yes  Action: hot pack/estim  Comments: Pain 10/10 in low back and bilateral legs. States she knows she is not going to get better. Would like to finish her last 2 visits. Does admit that she feels better when she leaves therapy.     Objective:  Modalities:   Treatment Location  Left      Right                          Position    [x]          [x]  [x] Supine    [] Prone   [] Side lying  [] Sitting          Treatment Modality    Hot Pack:    [x] Large two large   [] Medium    [] Cervical  x   20 min    Electrical Stim:    [x] IFC     [] MFAC      [] HVPC Protocol:_pain protocol x 20 min                          #Channels:  [x] 1        [x] 2       [] Other:    Other:         Precautions:  Exercises:   Exercise Reps/ Time Weight/ Level Comments    sci fit  5' L1  x          Standing       Incline stretch 3x20\"  Difficult    Heel raises 10x      Marches 10x      Hip abd 10x             Seated       LAQ 10x ea      Hamstring stretch 3x20\"      SB rollouts 10x3\"  Flexion - Added 10/29           Supine       LTR 10 x 3 sec  x   SKTC 5xea 10\"      x 3 sec  x   PPT with march 15x   x   PPT w/ SAME arm/leg lift 15x   x   DKTC 10x10\"      glute sets 10x 3 sec  x   SLR   Unable due to pain --   Bridge 10x   x   Hip add 15x 3 sec Yellow ball x   SAQ 10x 2 lb  x   Hip abd  10x Lime   x   Heel slide 10 x ea 2 lb  x          Sidelying       Hip abduction 6x ea  Attempted 10/29           Other:      Treatment Charges: Mins Units   [x]  Modalities HP/estim 20/20 0/1   [x]  Ther Exercise 45 3   []  Manual Therapy     []  Ther Activities     []  Aquatics     []  Vasocompression     []  Other - There act     Total Treatment time 65        Assessment: [x] Progressing toward goals. Increased weight for SAQ and added heel slides. [x] No change. Patient hyperverbal during treatment this date - tearful at times. Thankful for the therapy staff, even though she feels that she won't be getting any better. Happy during exercises. Unable to progress current exs due to pain. Attempted right SLR but had immediate pain shoot up right leg to her back. Ended with modalities to low back - stated she felt much better upon leaving, but no numerical rating given. [] Other:    [x] Patient would continue to benefit from skilled physical therapy services in order to:  address severely limited lumbar ROM, weakness of the core and LE's, abnormal posture (reduced lumbar lordosis), and functional impairments including difficulty with all ADL's. STG: (to be met in 8 treatments)  1. ? Pain: Decrease low back and LE pain to 7/10 - Not met, up to 10/10  2. ? ROM: Increase lumbar flexion to 50% limited, extension and rotation bilaterally to 25% limited -- No increase of lumbar ROM due to pain in her back. 3. ? Strength: Increase core strength as demonstrated by progression of DLS exercises - Progress, continued need for verbal and tactile cueing for proper activation  4. ? Function: Improve Oswestry to 65% impairment -- Oswestry score of 84% functionally impaired. 5. Patient to be independent with home exercise program as demonstrated by performance with correct form without cues. - MET  6. Demonstrate Knowledge of fall prevention - MET     LTG: (to be met in 12 treatments)  1. Increase lumbar ROM to wfl with minimal pain-- No change of pain or motion  2. Pt will report improvement in sleep to being reduced by less than 1/4 -- No change of sleep- uses rest room 10-15 times per night. Bladder is becoming incontinent. 3. Improve Oswestry to 50% impairment -- Oswestry score 84% functionally impaired     Patient goals: Hoping for relief of pain    Pt. Education:  [x] Yes  [] No  [x] Reviewed Prior HEP/Ed -- issued lime band for home use. Method of Education: [x] Verbal  [x] Demo  [] Written  Comprehension of Education:   [] Verbalizes understanding. [] Demonstrates understanding. [x] Needs review. [] Demonstrates/verbalizes HEP/Ed previously given. Plan: [x] Continue current frequency toward long and short term goals.     [x] Specific Instructions for subsequent treatments: Continue tx per POC, trial of HP during supine exercises       Time In: 1005a       Time Out: 3800M    Electronically signed by:  Jackson Garcia, PT

## 2021-11-05 ENCOUNTER — HOSPITAL ENCOUNTER (OUTPATIENT)
Dept: PHYSICAL THERAPY | Age: 66
Setting detail: THERAPIES SERIES
Discharge: HOME | End: 2021-11-05
Payer: MEDICARE

## 2021-11-05 PROCEDURE — 97110 THERAPEUTIC EXERCISES: CPT

## 2021-11-05 PROCEDURE — G0283 ELEC STIM OTHER THAN WOUND: HCPCS

## 2021-11-05 NOTE — FLOWSHEET NOTE
Other:         Precautions:  Exercises:   Exercise Reps/ Time Weight/ Level Comments    sci fit  5' L1  x          Standing       Incline stretch 3x10\"  Difficult x   Heel raises 10x      Marches 10x      Hip abd 10x             Seated       LAQ 10x ea      Hamstring stretch 3x20\"      SB rollouts 10x3\"  Flexion - Added 10/29 x          Supine       LTR 10 x 3 sec     SKTC 5xea 10\"     PPT 15x 3 sec  x   PPT with march 15x   x   PPT w/ SAME arm/leg lift 15x   x   DKTC 10x10\"      glute sets 10x 3 sec     SLR   Unable due to pain --   Bridge 10x      Hip add 15x 3 sec Yellow ball x   SAQ 10x 2 lb     Hip abd  15x Lime   x   Heel slide 10 x ea 2 lb  x          Sidelying       Hip abduction 6x ea  Attempted 10/29           Other:      Treatment Charges: Mins Units   [x]  Modalities HP/estim 15/15 0/1   [x]  Ther Exercise 40 3   []  Manual Therapy     []  Ther Activities     []  Aquatics     []  Vasocompression     []  Other - There act     Total Treatment time 55        Assessment: [x] Progressing toward goals. [x] No change. Patient continues with hyperverbal tendencies throughout treatment, treating therapist with increased difficulty keeping pt on task this date, thus limited exercises completed. Of those completed, no increased pain or onset of significant symptoms verbalized per patient. Continued with HP/estim following exercises as patient states most benefit. Educated home TENS units can be purchased for future use after discharge from therapy and patient with interest to explore this option. [] Other:    [x] Patient would continue to benefit from skilled physical therapy services in order to:  address severely limited lumbar ROM, weakness of the core and LE's, abnormal posture (reduced lumbar lordosis), and functional impairments including difficulty with all ADL's.       STG: (to be met in 8 treatments)  1. ? Pain: Decrease low back and LE pain to 7/10 - Not met, up to 10/10  2. ? ROM: Increase lumbar flexion to 50% limited, extension and rotation bilaterally to 25% limited -- No increase of lumbar ROM due to pain in her back. 3. ? Strength: Increase core strength as demonstrated by progression of DLS exercises - Progress, continued need for verbal and tactile cueing for proper activation  4. ? Function: Improve Oswestry to 65% impairment -- Oswestry score of 84% functionally impaired. 5. Patient to be independent with home exercise program as demonstrated by performance with correct form without cues. - MET  6. Demonstrate Knowledge of fall prevention - MET     LTG: (to be met in 12 treatments)  1. Increase lumbar ROM to wfl with minimal pain-- No change of pain or motion  2. Pt will report improvement in sleep to being reduced by less than 1/4 -- No change of sleep- uses rest room 10-15 times per night. Bladder is becoming incontinent. 3. Improve Oswestry to 50% impairment -- Oswestry score 84% functionally impaired     Patient goals: Hoping for relief of pain    Pt. Education:  [x] Yes  [] No  [x] Reviewed Prior HEP/Ed -- issued lime band for home use. Method of Education: [x] Verbal  [x] Demo  [] Written  Comprehension of Education:   [] Verbalizes understanding. [] Demonstrates understanding. [x] Needs review. [] Demonstrates/verbalizes HEP/Ed previously given. Plan: [x] Continue current frequency toward long and short term goals.     [x] Specific Instructions for subsequent treatments: Continue tx per POC, trial of HP during supine exercises       Time In: 525 pm       Time Out: 625 pm    Electronically signed by:  Llua Colón PTA

## 2021-11-10 ENCOUNTER — HOSPITAL ENCOUNTER (OUTPATIENT)
Dept: PHYSICAL THERAPY | Age: 66
Setting detail: THERAPIES SERIES
Discharge: HOME | End: 2021-11-10
Payer: MEDICARE

## 2021-11-10 PROCEDURE — 97110 THERAPEUTIC EXERCISES: CPT

## 2021-11-10 NOTE — FLOWSHEET NOTE
[x] [facility]) [facility] &  Therapy  [address]  [phone]  F: [phone] [] [address]  [facility]   [address]  P: [phone]  F: [phone] [] [address] &  Therapy  [address]  P: [phone]  F: [phone] [] [address]  P: [phone]  F: [phone] [] [address]  [facility]   [address]   [place]: [phone]  F: [phone]      Physical Therapy Daily Treatment Note    Date:  11/10/2021  Patient Name:  Eugenia Pritchard    :  1955  MRN: [id number]  Physician: Cong Cano NP                                    Ins[facility] [facility], Medicaid (12 visits, from 21 to 21)  Medical Diagnosis: Spinal stenosis of lumbar region (M48.061)                  Rehab Codes: M54.5, M79.604, M79.605, M25.60, M62.81  Onset Date: 21                 Next 's appt. :    Visit# / total visits: ; Progress note for Medicare patient due at visit 10        Cancels/No Shows:     Subjective:    Pain:  [x] Yes  [] No Location: low back and bilateral legs. Pain Rating: (0-10 scale) 8/10  Pain altered Tx:  [x] No  [] Yes  Action:   Comments: Patient reports pain in low back and goes down the back of the leg. Notes she feels both legs feel really weak today. Insurance is offering silver sneakers and she is interested.       Objective:  Modalities:   Treatment Location  Left      Right                          Position    []          []  [] Supine    [] Prone   [] Side lying  [] Sitting          Treatment Modality    Hot Pack:    [] Large two large   [] Medium    [] Cervical  x   20 min    Electrical Stim:    [] IFC     [] MFAC      [] HVPC                          Protocol:_pain protocol x 20 and LE pain to 7/10 - Not met, up to 10/10  2. ? ROM: Increase lumbar flexion to 50% limited, extension and rotation bilaterally to 25% limited -- No increase of lumbar ROM due to pain in her back. 3. ? Strength: Increase core strength as demonstrated by progression of DLS exercises - Progress, continued need for verbal and tactile cueing for proper activation  4. ? Function: Improve Oswestry to 65% impairment -- Oswestry score of 84% functionally impaired. 5. Patient to be independent with home exercise program as demonstrated by performance with correct form without cues. - MET  6. Demonstrate Knowledge of fall prevention - MET     LTG: (to be met in 12 treatments)  1. Increase lumbar ROM to wfl with minimal pain-- No change of pain or motion  2. Pt will report improvement in sleep to being reduced by less than 1/4 -- No change of sleep- uses rest room 10-15 times per night. Bladder is becoming incontinent. 3. Improve Oswestry to 50% impairment -- Oswestry score 84% functionally impaired     Patient goals: Hoping for relief of pain    Pt. Education:  [x] Yes  [] No  [x] Reviewed Prior HEP/Ed -- issued lime band for home use. Method of Education: [x] Verbal  [x] Demo  [] Written  Comprehension of Education:   [x] Verbalizes understanding. [x] Demonstrates understanding. [x] Needs review. [x] Demonstrates/verbalizes HEP/Ed previously given. Plan: [x] Patient to be discharged with completion of therapy.          Time In: 250 pm       Time Out: 345 pm    Electronically signed by:  Suma Haynes PTA

## 2021-11-20 ENCOUNTER — APPOINTMENT (OUTPATIENT)
Dept: GENERAL RADIOLOGY | Age: 66
End: 2021-11-20
Payer: MEDICARE

## 2021-11-20 ENCOUNTER — HOSPITAL ENCOUNTER (EMERGENCY)
Age: 66
Discharge: HOME | End: 2021-11-20
Attending: EMERGENCY MEDICINE
Payer: MEDICARE

## 2021-11-20 VITALS
BODY MASS INDEX: 22.29 KG/M2 | TEMPERATURE: 97.5 F | HEART RATE: 76 BPM | HEIGHT: 67 IN | DIASTOLIC BLOOD PRESSURE: 78 MMHG | WEIGHT: 142 LBS | OXYGEN SATURATION: 98 % | SYSTOLIC BLOOD PRESSURE: 107 MMHG

## 2021-11-20 DIAGNOSIS — G89.29 ACUTE EXACERBATION OF CHRONIC LOW BACK PAIN: Primary | ICD-10-CM

## 2021-11-20 DIAGNOSIS — M54.50 ACUTE EXACERBATION OF CHRONIC LOW BACK PAIN: Primary | ICD-10-CM

## 2021-11-20 PROCEDURE — 72100 X-RAY EXAM L-S SPINE 2/3 VWS: CPT

## 2021-11-20 PROCEDURE — 6370000000 HC RX 637 (ALT 250 FOR IP): Performed by: STUDENT IN AN ORGANIZED HEALTH CARE EDUCATION/TRAINING PROGRAM

## 2021-11-20 PROCEDURE — 99282 EMERGENCY DEPT VISIT SF MDM: CPT

## 2021-11-20 PROCEDURE — 96372 THER/PROPH/DIAG INJ SC/IM: CPT

## 2021-11-20 PROCEDURE — 6360000002 HC RX W HCPCS: Performed by: STUDENT IN AN ORGANIZED HEALTH CARE EDUCATION/TRAINING PROGRAM

## 2021-11-20 RX ORDER — ACETAMINOPHEN 325 MG/1
650 TABLET ORAL ONCE
Status: COMPLETED | OUTPATIENT
Start: 2021-11-20 | End: 2021-11-20

## 2021-11-20 RX ORDER — ORPHENADRINE CITRATE 30 MG/ML
60 INJECTION INTRAMUSCULAR; INTRAVENOUS ONCE
Status: COMPLETED | OUTPATIENT
Start: 2021-11-20 | End: 2021-11-20

## 2021-11-20 RX ADMIN — ORPHENADRINE CITRATE 60 MG: 30 INJECTION INTRAMUSCULAR; INTRAVENOUS at 16:14

## 2021-11-20 RX ADMIN — ACETAMINOPHEN 650 MG: 325 TABLET ORAL at 16:14

## 2021-11-20 ASSESSMENT — ENCOUNTER SYMPTOMS
NAUSEA: 0
SHORTNESS OF BREATH: 0
VOMITING: 0
BACK PAIN: 1
DIARRHEA: 0
ABDOMINAL PAIN: 0
CONSTIPATION: 0

## 2021-11-20 ASSESSMENT — PAIN SCALES - GENERAL: PAINLEVEL_OUTOF10: 9

## 2021-11-20 NOTE — ED PROVIDER NOTES
[facility] ED  Emergency Department Encounter  EmergencyMedicine Resident     Pt Chelly Wilkins  MRN: [id number]  [place] 1955  Date of evaluation: 11/20/21  PCP:  LORRAINE Bowen    CHIEF COMPLAINT       Chief Complaint   Patient presents with    Back Pain     HISTORY OF PRESENT ILLNESS  (Location/Symptom, Timing/Onset, Context/Setting, Quality, Duration, Modifying Factors, Severity.)      Cirilo Britt is a 72 y.o. female with a past medical history of Chronic Back Pain with Sciatica, Emphysema and GERD who presents with the chief complaint of back pain localized to bilateral lumbar spine described as a sharp pain. Patient states the pain has been worse for the past day however she has been dealing with back pain for 30 years. Denies fevers, chills, saddle anesthesia, bowel or bladder incontinence. No recent trauma. PAST MEDICAL / SURGICAL / SOCIAL / FAMILY HISTORY      has a past medical history of Emphysema (subcutaneous) (surgical) resulting from a procedure, GERD (gastroesophageal reflux disease), and H. pylori infection. has no past surgical history on file.     Social History     Socioeconomic History    Marital status: Single     Spouse name: Not on file    Number of children: Not on file    Years of education: Not on file    Highest education level: Not on file   Occupational History    Not on file   Tobacco Use    Smoking status: Current Some Day Smoker     Packs/day: 0.50     Types: Cigarettes    Smokeless tobacco: Never Used   Substance and Sexual Activity    Alcohol use: Never    Drug use: Never    Sexual activity: Not on file   Other Topics Concern    Not on file   Social History Narrative    Not on file     Social Determinants of Health     Financial Resource Strain:     Difficulty of Paying Living Expenses: Not on file   Food Insecurity:     Worried About [address] in the Last Year: Not on file    [facility] in the Last Year: Not on file   Transportation Needs:     Lack of Transportation (Medical): Not on file    Lack of Transportation (Non-Medical): Not on file   Physical Activity:     Days of Exercise per Week: Not on file    Minutes of Exercise per Session: Not on file   Stress:     Feeling of Stress : Not on file   Social Connections:     Frequency of Communication with Friends and Family: Not on file    Frequency of Social Gatherings with Friends and Family: Not on file    Attends Buddhism Services: Not on file    Active Member of [address] [place] [address] [employer] or Organizations: Not on file    Attends Club or Organization Meetings: Not on file    Marital Status: Not on file   Intimate Partner Violence:     Fear of Current or Ex-Partner: Not on file    Emotionally Abused: Not on file    Physically Abused: Not on file    Sexually Abused: Not on file   Housing Stability:     Unable to Pay for Housing in the Last Year: Not on file    Number of [place] in the Last Year: Not on file    Unstable Housing in the Last Year: Not on file       History reviewed. No pertinent family history. Allergies:  Gadolinium, Iodides, Ketorolac, Metronidazole, Propoxyphene, Tetanus-diphtheria toxoids td, Tuberculin tests, Benadryl [diphenhydramine], Decadron [dexamethasone], Gadolinium derivatives, Iodinated diagnostic agents, Iodine, Penicillins, Tetanus toxoids, and Other    Home Medications:  Prior to Admission medications    Medication Sig Start Date End Date Taking?  Authorizing Provider   acetaminophen (TYLENOL) 500 MG tablet Take 1 tablet by mouth every 6 hours as needed for Pain 8/7/21 8/27/21  Byron Powers DO   ibuprofen (ADVIL;MOTRIN) 600 MG tablet Take 1 tablet by mouth every 6 hours as needed for Pain 8/7/21   Byron Powers DO   tiZANidine (ZANAFLEX) 2 MG tablet Take 1 tablet by mouth 3 times daily as needed (Muscle spasm) 5/19/21   Tramaine Alvarez MD   gabapentin (NEURONTIN) 300 MG capsule Take 1 capsule by mouth 2 times daily for 30 days. 4/10/21 5/10/21  Mikayla Jo MD   famotidine (PEPCID) 40 MG tablet Take 1 tablet by mouth daily 4/10/21   Mikayla Jo MD   ALPRAZolam Sung Rouleau) 0.25 MG tablet Take 1 tablet by mouth nightly as needed for Sleep. 4/10/21 4/10/22  Mikayla Jo MD   albuterol sulfate HFA (VENTOLIN HFA) 108 (90 Base) MCG/ACT inhaler Inhale 2 puffs into the lungs 4 times daily as needed for Wheezing 4/10/21   Mikayla Jo MD   Elastic Bandages & Supports (LUMBAR BACK BRACE/SUPPORT PAD) MISC 1 Units by Does not apply route as needed (PNEUMATIC OFFLOADING BACK BRACE) 12/11/20 12/11/21  Doreen Bah MD       REVIEW OF SYSTEMS    (2-9 systems for level 4, 10 or more for level 5)      Review of Systems   Constitutional: Negative for chills and fever. Respiratory: Negative for shortness of breath. Cardiovascular: Negative for chest pain. Gastrointestinal: Negative for abdominal pain, constipation, diarrhea, nausea and vomiting. Genitourinary: Negative for dysuria, flank pain, frequency, hematuria and urgency. Musculoskeletal: Positive for back pain. Skin: Negative for rash and wound. Neurological: Negative for weakness. PHYSICAL EXAM   (up to 7 for level 4, 8 or more for level 5)      INITIAL VITALS:   /78   Pulse 76   Temp 97.5 °F (36.4 °C)   Ht 5' 7\" (1.702 m)   Wt 142 lb (64.4 kg)   SpO2 98%   BMI 22.24 kg/m²     Physical Exam  Vitals and nursing note reviewed. Constitutional:       General: She is not in acute distress. Appearance: Normal appearance. She is normal weight. She is not ill-appearing, toxic-appearing or diaphoretic. Comments: Tearful on examination due to pain. HENT:      Head: Normocephalic and atraumatic. Nose: Nose normal.      Mouth/Throat:      Mouth: Mucous membranes are moist.   Eyes:      Extraocular Movements: Extraocular movements intact. Cardiovascular:      Rate and Rhythm: Normal rate and regular rhythm. Pulses: Normal pulses. Heart sounds: Normal heart sounds. No murmur heard. No gallop. Pulmonary:      Effort: Pulmonary effort is normal. No respiratory distress. Breath sounds: Normal breath sounds. No wheezing, rhonchi or rales. Chest:      Chest wall: No tenderness. Abdominal:      General: Abdomen is flat. Palpations: Abdomen is soft. Tenderness: There is no abdominal tenderness. Musculoskeletal:         General: Tenderness present. Comments: On examination of patients back there is minimal midline tenderness, no step off but there is paravertebral tenderness bilaterally in the lumber spine. Straight leg raise negative bilaterally. Skin:     General: Skin is warm and dry. Neurological:      General: No focal deficit present. Mental Status: She is alert and oriented to person, place, and time. Comments: Strength equal in bilateral lower extremities. DIFFERENTIAL  DIAGNOSIS     PLAN (LABS / IMAGING / EKG):  Orders Placed This Encounter   Procedures    XR LUMBAR SPINE (2-3 VIEWS)       MEDICATIONS ORDERED:  Orders Placed This Encounter   Medications    orphenadrine (NORFLEX) injection 60 mg    acetaminophen (TYLENOL) tablet 650 mg       DDX: Acute on Chronic Lumbar Back Pain, Lumbar Strain. DIAGNOSTIC RESULTS / EMERGENCY DEPARTMENT COURSE / MDM   LAB RESULTS:  No results found for this visit on 11/20/21. RADIOLOGY:  XR LUMBAR SPINE (2-3 VIEWS)   Final Result   Multilevel degenerative changes, similar in appearance. No acute osseous   abnormality. EMERGENCY DEPARTMENT COURSE:  ED Course as of 11/20/21 1641   Sat Nov 20, 2021   1640 Re-evaluated patient at beside. States pain has resolved with Norflex. Updated patient on Lumbar Xray. No fracture. [NS]      ED Course User Index  [NS] Dorie Patches, DO      PROCEDURES:  None.      CONSULTS:  None    MEDICAL DECISION MAKING:  This is a 72year old female presenting to the emergency department with the chief complaint of acute on chronic lower back pain. No alarm symptoms and no recent trauma. Patient given Tylenol 650 mg and Norflex 60 mg IM. Lumbar Xray negative for fracture. Pain resolved with Norflex. CRITICAL CARE:  Please see attending note    FINAL IMPRESSION      1. Acute exacerbation of chronic low back pain      DISPOSITION / PLAN     DISPOSITION      PATIENT REFERRED TO:  Andrae Pearl, APRN  [address]  [phone]    Schedule an appointment as soon as possible for a visit in 1 week  ED Discharge and referral back to PT/Pain management.       DISCHARGE MEDICATIONS:  New Prescriptions    No medications on file       Marjan Gamble DO  Emergency Medicine Resident    (Please note that portions of thisnote were completed with a voice recognition program.  Efforts were made to edit the dictations but occasionally words are mis-transcribed.)       Marjan Gamble DO  Resident  11/20/21 7714

## 2021-11-20 NOTE — ED PROVIDER NOTES
[facility]     Emergency Department     Faculty Attestation    I performed a history and physical examination of the patient and discussed management with the resident. I reviewed the residents note and agree with the documented findings and plan of care. Any areas of disagreement are noted on the chart. I was personally present for the key portions of any procedures. I have documented in the chart those procedures where I was not present during the key portions. I have reviewed the emergency nurses triage note. I agree with the chief complaint, past medical history, past surgical history, allergies, medications, social and family history as documented unless otherwise noted below. For Physician Assistant/ Nurse Practitioner cases/documentation I have personally evaluated this patient and have completed at least one if not all key elements of the E/M (history, physical exam, and MDM). Additional findings are as noted. I have personally seen and evaluated the patient. I find the patient's history and physical exam are consistent with the NP/PA documentation. I agree with the care provided, treatment rendered, disposition and follow-up plan. 60-year-old female with a history of chronic back pain presenting with acute exacerbation of her back pain. Started at rest, in the middle of her low back. She denies any falls, lifting, or twisting. It has been going down her legs slightly, but the pain is worse in her back than it ever has been. She has been trying tramadol at home with minimal improvement. It does not change with positions, feels bad both curled up and when laying down straight. No numbness or tingling. No bowel or bladder changes. No weakness or falls.     Exam:  General: Laying on the bed and awake, alert  CV: normal rate and regular rhythm  Lungs: Breathing comfortably on room air with no tachypnea, hypoxia, or increased work of breathing  MSK: Severe midline tenderness over the lumbar spine. Moderate lateral tenderness in the same region. No thoracic pain. No hip pain. Neuro: Awake, alert, moving all extremities.   No weakness or numbness in the lower extremities    Plan:  X-ray to rule out compression fracture or other bony abnormalities  Pain control  Follow-up with neurosurgery and pain management as previously scheduled        Chiquis Licea MD   Attending Emergency  Physician    (Please note that portions of this note were completed with a voice recognition program. Efforts were made to edit the dictations but occasionally words are mis-transcribed.)              Chiquis Licea MD  11/20/21 [id number]

## 2022-01-18 ENCOUNTER — HOSPITAL ENCOUNTER (EMERGENCY)
Age: 67
Discharge: HOME | End: 2022-01-18
Attending: EMERGENCY MEDICINE
Payer: MEDICARE

## 2022-01-18 ENCOUNTER — APPOINTMENT (OUTPATIENT)
Dept: GENERAL RADIOLOGY | Age: 67
End: 2022-01-18
Payer: MEDICARE

## 2022-01-18 VITALS
HEART RATE: 67 BPM | OXYGEN SATURATION: 98 % | TEMPERATURE: 98.7 F | DIASTOLIC BLOOD PRESSURE: 98 MMHG | RESPIRATION RATE: 18 BRPM | SYSTOLIC BLOOD PRESSURE: 120 MMHG

## 2022-01-18 DIAGNOSIS — N39.0 URINARY TRACT INFECTION IN FEMALE: ICD-10-CM

## 2022-01-18 DIAGNOSIS — G89.29 CHRONIC BILATERAL LOW BACK PAIN WITH RIGHT-SIDED SCIATICA: ICD-10-CM

## 2022-01-18 DIAGNOSIS — R55 NEAR SYNCOPE: Primary | ICD-10-CM

## 2022-01-18 DIAGNOSIS — E86.0 DEHYDRATION: ICD-10-CM

## 2022-01-18 DIAGNOSIS — M54.41 CHRONIC BILATERAL LOW BACK PAIN WITH RIGHT-SIDED SCIATICA: ICD-10-CM

## 2022-01-18 LAB
ABSOLUTE EOS #: 0.04 K/UL (ref 0–0.44)
ABSOLUTE IMMATURE GRANULOCYTE: <0.03 K/UL (ref 0–0.3)
ABSOLUTE LYMPH #: 3.17 K/UL (ref 1.1–3.7)
ABSOLUTE MONO #: 0.49 K/UL (ref 0.1–1.2)
ANION GAP SERPL CALCULATED.3IONS-SCNC: 14 MMOL/L (ref 9–17)
BASOPHILS # BLD: 1 % (ref 0–2)
BASOPHILS ABSOLUTE: 0.04 K/UL (ref 0–0.2)
BILIRUBIN URINE: NEGATIVE
BNP INTERPRETATION: NORMAL
BUN BLDV-MCNC: 16 MG/DL (ref 8–23)
BUN/CREAT BLD: ABNORMAL (ref 9–20)
CALCIUM SERPL-MCNC: 9.9 MG/DL (ref 8.6–10.4)
CHLORIDE BLD-SCNC: 106 MMOL/L (ref 98–107)
CO2: 23 MMOL/L (ref 20–31)
COLOR: ABNORMAL
CREAT SERPL-MCNC: 1.4 MG/DL (ref 0.5–0.9)
DIFFERENTIAL TYPE: ABNORMAL
EOSINOPHILS RELATIVE PERCENT: 1 % (ref 1–4)
GFR AFRICAN AMERICAN: 46 ML/MIN
GFR NON-AFRICAN AMERICAN: 38 ML/MIN
GFR SERPL CREATININE-BSD FRML MDRD: ABNORMAL ML/MIN/{1.73_M2}
GFR SERPL CREATININE-BSD FRML MDRD: ABNORMAL ML/MIN/{1.73_M2}
GLUCOSE BLD-MCNC: 92 MG/DL (ref 70–99)
GLUCOSE URINE: NEGATIVE
HCT VFR BLD CALC: 41.1 % (ref 36.3–47.1)
HEMOGLOBIN: 13.4 G/DL (ref 11.9–15.1)
IMMATURE GRANULOCYTES: 0 %
KETONES, URINE: ABNORMAL
LEUKOCYTE ESTERASE, URINE: ABNORMAL
LIPASE: 17 U/L (ref 13–60)
LYMPHOCYTES # BLD: 53 % (ref 24–43)
MCH RBC QN AUTO: 28.9 PG (ref 25.2–33.5)
MCHC RBC AUTO-ENTMCNC: 32.6 G/DL (ref 28.4–34.8)
MCV RBC AUTO: 88.6 FL (ref 82.6–102.9)
MONOCYTES # BLD: 8 % (ref 3–12)
NITRITE, URINE: NEGATIVE
NRBC AUTOMATED: 0 PER 100 WBC
PDW BLD-RTO: 13.9 % (ref 11.8–14.4)
PH UA: 5 (ref 5–8)
PLATELET # BLD: 246 K/UL (ref 138–453)
PLATELET ESTIMATE: ABNORMAL
PMV BLD AUTO: 10.9 FL (ref 8.1–13.5)
POTASSIUM SERPL-SCNC: 3.9 MMOL/L (ref 3.7–5.3)
PRO-BNP: 120 PG/ML
PROTEIN UA: ABNORMAL
RBC # BLD: 4.64 M/UL (ref 3.95–5.11)
RBC # BLD: ABNORMAL 10*6/UL
SEG NEUTROPHILS: 37 % (ref 36–65)
SEGMENTED NEUTROPHILS ABSOLUTE COUNT: 2.18 K/UL (ref 1.5–8.1)
SODIUM BLD-SCNC: 143 MMOL/L (ref 135–144)
SPECIFIC GRAVITY UA: 1.02 (ref 1–1.03)
TROPONIN INTERP: NORMAL
TROPONIN T: NORMAL NG/ML
TROPONIN, HIGH SENSITIVITY: <6 NG/L (ref 0–14)
TURBIDITY: ABNORMAL
URINE HGB: ABNORMAL
UROBILINOGEN, URINE: NORMAL
WBC # BLD: 5.9 K/UL (ref 3.5–11.3)
WBC # BLD: ABNORMAL 10*3/UL

## 2022-01-18 PROCEDURE — 87086 URINE CULTURE/COLONY COUNT: CPT

## 2022-01-18 PROCEDURE — 83880 ASSAY OF NATRIURETIC PEPTIDE: CPT

## 2022-01-18 PROCEDURE — 71045 X-RAY EXAM CHEST 1 VIEW: CPT

## 2022-01-18 PROCEDURE — 80048 BASIC METABOLIC PNL TOTAL CA: CPT

## 2022-01-18 PROCEDURE — 96360 HYDRATION IV INFUSION INIT: CPT

## 2022-01-18 PROCEDURE — 6370000000 HC RX 637 (ALT 250 FOR IP): Performed by: STUDENT IN AN ORGANIZED HEALTH CARE EDUCATION/TRAINING PROGRAM

## 2022-01-18 PROCEDURE — 81003 URINALYSIS AUTO W/O SCOPE: CPT

## 2022-01-18 PROCEDURE — 2580000003 HC RX 258: Performed by: STUDENT IN AN ORGANIZED HEALTH CARE EDUCATION/TRAINING PROGRAM

## 2022-01-18 PROCEDURE — 84484 ASSAY OF TROPONIN QUANT: CPT

## 2022-01-18 PROCEDURE — 83690 ASSAY OF LIPASE: CPT

## 2022-01-18 PROCEDURE — 93005 ELECTROCARDIOGRAM TRACING: CPT | Performed by: STUDENT IN AN ORGANIZED HEALTH CARE EDUCATION/TRAINING PROGRAM

## 2022-01-18 PROCEDURE — 99285 EMERGENCY DEPT VISIT HI MDM: CPT

## 2022-01-18 PROCEDURE — 85025 COMPLETE CBC W/AUTO DIFF WBC: CPT

## 2022-01-18 PROCEDURE — 96361 HYDRATE IV INFUSION ADD-ON: CPT

## 2022-01-18 RX ORDER — CEPHALEXIN 500 MG/1
500 CAPSULE ORAL ONCE
Status: COMPLETED | OUTPATIENT
Start: 2022-01-18 | End: 2022-01-18

## 2022-01-18 RX ORDER — LIDOCAINE 50 MG/G
1 PATCH TOPICAL DAILY
Qty: 10 PATCH | Refills: 0 | Status: SHIPPED | OUTPATIENT
Start: 2022-01-18 | End: 2022-01-28

## 2022-01-18 RX ORDER — CEPHALEXIN 500 MG/1
500 CAPSULE ORAL 4 TIMES DAILY
Qty: 28 CAPSULE | Refills: 0 | Status: SHIPPED | OUTPATIENT
Start: 2022-01-18 | End: 2022-01-25

## 2022-01-18 RX ORDER — IBUPROFEN 800 MG/1
800 TABLET ORAL ONCE
Status: COMPLETED | OUTPATIENT
Start: 2022-01-18 | End: 2022-01-18

## 2022-01-18 RX ORDER — IBUPROFEN 600 MG/1
600 TABLET ORAL EVERY 6 HOURS PRN
Qty: 20 TABLET | Refills: 0 | Status: SHIPPED | OUTPATIENT
Start: 2022-01-18 | End: 2022-03-01 | Stop reason: SDUPTHER

## 2022-01-18 RX ORDER — 0.9 % SODIUM CHLORIDE 0.9 %
1000 INTRAVENOUS SOLUTION INTRAVENOUS ONCE
Status: COMPLETED | OUTPATIENT
Start: 2022-01-18 | End: 2022-01-18

## 2022-01-18 RX ADMIN — SODIUM CHLORIDE 1000 ML: 9 INJECTION, SOLUTION INTRAVENOUS at 17:06

## 2022-01-18 RX ADMIN — CEPHALEXIN 500 MG: 500 CAPSULE ORAL at 19:57

## 2022-01-18 RX ADMIN — IBUPROFEN 800 MG: 800 TABLET, FILM COATED ORAL at 17:06

## 2022-01-18 ASSESSMENT — ENCOUNTER SYMPTOMS
ABDOMINAL PAIN: 0
VOMITING: 0
BACK PAIN: 1
NAUSEA: 0
COLOR CHANGE: 0
COUGH: 0
SHORTNESS OF BREATH: 0

## 2022-01-18 ASSESSMENT — PAIN SCALES - GENERAL: PAINLEVEL_OUTOF10: 6

## 2022-01-18 ASSESSMENT — PAIN DESCRIPTION - LOCATION: LOCATION: BACK

## 2022-01-18 NOTE — Clinical Note
Anastasiia Garcia was seen and treated in our emergency department on 1/18/2022. She may return to work on 01/21/2022. If you have any questions or concerns, please don't hesitate to call.       Brennan Gaxiola, DO

## 2022-01-19 LAB
CULTURE: NORMAL
EKG ATRIAL RATE: 69 BPM
EKG P AXIS: 71 DEGREES
EKG P-R INTERVAL: 184 MS
EKG Q-T INTERVAL: 394 MS
EKG QRS DURATION: 84 MS
EKG QTC CALCULATION (BAZETT): 422 MS
EKG R AXIS: 28 DEGREES
EKG T AXIS: 48 DEGREES
EKG VENTRICULAR RATE: 69 BPM
Lab: NORMAL
SPECIMEN DESCRIPTION: NORMAL

## 2022-01-19 PROCEDURE — 93010 ELECTROCARDIOGRAM REPORT: CPT | Performed by: INTERNAL MEDICINE

## 2022-01-19 NOTE — ED PROVIDER NOTES
[facility]     Emergency Department     Faculty Attestation    I performed a history and physical examination of the patient and discussed management with the resident. I reviewed the residents note and agree with the documented findings and plan of care. Any areas of disagreement are noted on the chart. I was personally present for the key portions of any procedures. I have documented in the chart those procedures where I was not present during the key portions. I have reviewed the emergency nurses triage note. I agree with the chief complaint, past medical history, past surgical history, allergies, medications, social and family history as documented unless otherwise noted below. For Physician Assistant/ Nurse Practitioner cases/documentation I have personally evaluated this patient and have completed at least one if not all key elements of the E/M (history, physical exam, and MDM). Additional findings are as noted. I have personally seen and evaluated the patient. I find the patient's history and physical exam are consistent with the NP/PA documentation. I agree with the care provided, treatment rendered, disposition and follow-up plan. This patient was evaluated in the Emergency Department for symptoms described in the history of present illness. The patient was evaluated in the context of the global COVID-19 pandemic, which necessitated consideration that the patient might be at risk for infection with the SARS-CoV-2 virus that causes COVID-19. Institutional protocols and algorithms that pertain to the evaluation of patients at risk for COVID-19 are in a state of rapid change based on information released by regulatory bodies including the CDC and federal and state organizations. These policies and algorithms were followed during the patient's care in the ED. 60-year-old female presenting with exacerbation of chronic back pain.   Patient has not been undergoing physical therapy and has seen neurosurgery for her pain in the past.  No new injuries. Today had a near syncopal event at work. Believes that this was secondary to hypoglycemia, and improved after drinking juice. No chest pain or shortness of breath. No dizziness. Has noted her urine has been more concentrated recently. Exam:  General: Laying on the bed, awake, alert and in no acute distress  CV: normal rate and regular rhythm  Lungs: Breathing comfortably on room air with no tachypnea, hypoxia, or increased work of breathing    Plan:  Labs including cardiac enzymes, UA, CBC, electrolytes  Postvoid residual equals 0  UA showing evidence of UTI  Orthostatics with no evidence of orthostatic hypotension. Patient is no longer near syncopal.  Will recommend follow-up with her neurosurgeon, continued hydration, and will start antibiotics for UTI.         Robyn Collier MD   Attending Emergency  Physician    (Please note that portions of this note were completed with a voice recognition program. Efforts were made to edit the dictations but occasionally words are mis-transcribed.)              Robyn Collier MD  01/18/22 [id number]

## 2022-01-19 NOTE — ED PROVIDER NOTES
[facility] ED  Emergency Department Encounter  Emergency Medicine Resident     Pt Name: Carlos Calvillo  MRN: [id number]  [place] 1955  Date of evaluation: 1/18/22  PCP:  LORRAINE Jackson    CHIEF COMPLAINT       Chief Complaint   Patient presents with    Loss of Consciousness     x5 today       HISTORY OFPRESENT ILLNESS  (Location/Symptom, Timing/Onset, Context/Setting, Quality, Duration, Modifying Brena Going.)      Carlos Calvillo is a 77 y.o. female with past medical history of GERD, H. pylori, chronic back pain who presents with complaints of near syncope. Patient was at work today when she had acute onset of \"seeing stars and black spots \"she felt like she was going to pass out however she states she did not totally lose consciousness and was able to sit down. Patient states that her blood sugars frequently run low and she drank some juice and began to feel better and her vision cleared up. She states she did have a similar episode that occurred yesterday which presented the same way and also did not lead to total loss of consciousness. Patient denies chest pain or shortness of breath with this. Patient also is endorsing lower back pain. She states she does have a history of chronic lower back pain for which she has followed with Dr Mark Silva in the past.  She also endorses a sensation of 2 months of polyuria and urinary incontinence however she denies numbness/weakness, difficulty with ambulation, saddle anesthesia. No fevers or chills. No recent falls. PAST MEDICAL / SURGICAL / SOCIAL / FAMILY HISTORY      has a past medical history of Emphysema (subcutaneous) (surgical) resulting from a procedure, GERD (gastroesophageal reflux disease), and H. pylori infection. has no past surgical history on file.     Social History     Socioeconomic History    Marital status: Single     Spouse name: Not on file    Number of children: Not on file    Years of education: Not on file    Highest education level: Not on file   Occupational History    Not on file   Tobacco Use    Smoking status: Current Some Day Smoker     Packs/day: 0.50     Types: Cigarettes    Smokeless tobacco: Never Used   Substance and Sexual Activity    Alcohol use: Never    Drug use: Never    Sexual activity: Not on file   Other Topics Concern    Not on file   Social History Narrative    Not on file     Social Determinants of Health     Financial Resource Strain:     Difficulty of Paying Living Expenses: Not on file   Food Insecurity:     Worried About Running Out of Food in the Last Year: Not on file    Catalina of Food in the Last Year: Not on file   Transportation Needs:     Lack of Transportation (Medical): Not on file    Lack of Transportation (Non-Medical): Not on file   Physical Activity:     Days of Exercise per Week: Not on file    Minutes of Exercise per Session: Not on file   Stress:     Feeling of Stress : Not on file   Social Connections:     Frequency of Communication with Friends and Family: Not on file    Frequency of Social Gatherings with Friends and Family: Not on file    Attends Hoahaoism Services: Not on file    Active Member of [address] [place] [address] or Organizations: Not on file    Attends Club or Organization Meetings: Not on file    Marital Status: Not on file   Intimate Partner Violence:     Fear of Current or Ex-Partner: Not on file    Emotionally Abused: Not on file    Physically Abused: Not on file    Sexually Abused: Not on file   Housing Stability:     Unable to Pay for Housing in the Last Year: Not on file    Number of [place] in the Last Year: Not on file    Unstable Housing in the Last Year: Not on file       No family history on file.     Allergies:  Gadolinium, Iodides, Ketorolac, Metronidazole, Propoxyphene, Tetanus-diphtheria toxoids td, Tuberculin tests, Benadryl [diphenhydramine], Decadron [dexamethasone], Gadolinium derivatives, Iodinated diagnostic agents, Iodine, Penicillins, Tetanus toxoids, and Other    Home Medications:  Prior to Admission medications    Medication Sig Start Date End Date Taking? Authorizing Provider   cephALEXin (KEFLEX) 500 MG capsule Take 1 capsule by mouth 4 times daily for 7 days 1/18/22 1/25/22 Yes Zahra Valle,    acetaminophen (TYLENOL) 500 MG tablet Take 1 tablet by mouth every 6 hours as needed for Pain 8/7/21 8/27/21  Juliana Causey, DO   ibuprofen (ADVIL;MOTRIN) 600 MG tablet Take 1 tablet by mouth every 6 hours as needed for Pain 8/7/21   Juliana Causey, DO   tiZANidine (ZANAFLEX) 2 MG tablet Take 1 tablet by mouth 3 times daily as needed (Muscle spasm) 5/19/21   Lorena Mccrary MD   gabapentin (NEURONTIN) 300 MG capsule Take 1 capsule by mouth 2 times daily for 30 days. 4/10/21 5/10/21  Aminah Domingo MD   famotidine (PEPCID) 40 MG tablet Take 1 tablet by mouth daily 4/10/21   Aminah Domingo MD   ALPRAZolam [place]o [place]) 0.25 MG tablet Take 1 tablet by mouth nightly as needed for Sleep. 4/10/21 4/10/22  Aminah Domingo MD   albuterol sulfate HFA (VENTOLIN HFA) 108 (90 Base) MCG/ACT inhaler Inhale 2 puffs into the lungs 4 times daily as needed for Wheezing 4/10/21   Aminah Domingo MD   Elastic Bandages & Supports (LUMBAR BACK BRACE/SUPPORT PAD) MISC 1 Units by Does not apply route as needed (PNEUMATIC OFFLOADING BACK BRACE) 12/11/20 12/11/21  Shane Sneed MD       REVIEW OF SYSTEMS    (2-9 systems for level 4, 10 or more for level 5)      Review of Systems   Constitutional: Negative for fatigue and fever. Respiratory: Negative for cough and shortness of breath. Gastrointestinal: Negative for abdominal pain, nausea and vomiting. Endocrine: Positive for polyuria. Genitourinary: Positive for urgency. Negative for dysuria. Musculoskeletal: Positive for back pain. Skin: Negative for color change and wound. Neurological: Positive for syncope. Negative for headaches.    Psychiatric/Behavioral: Negative for confusion. PHYSICAL EXAM   (up to 7 for level 4, 8 or more for level 5)     INITIAL VITALS:    oral temperature is 98.7 °F (37.1 °C). Her blood pressure is 120/98 (abnormal) and her pulse is 67. Her respiration is 18 and oxygen saturation is 98%. Physical Exam  Constitutional:       Comments: Alert and oriented to person, place, time, she does appear to be uncomfortable however no acute distress, not ill in appearance   HENT:      Head: Normocephalic and atraumatic. Mouth/Throat:      Mouth: Mucous membranes are dry. Eyes:      Extraocular Movements: Extraocular movements intact. Pupils: Pupils are equal, round, and reactive to light. Cardiovascular:      Rate and Rhythm: Normal rate and regular rhythm. Heart sounds: No murmur heard. No friction rub. No gallop. Pulmonary:      Effort: Pulmonary effort is normal. No respiratory distress. Breath sounds: Normal breath sounds. No wheezing. Abdominal:      Comments: Soft, flat, nondistended, epigastric tenderness with some involuntary guarding however tenderness appears to be distractible, no rebound tenderness, no rigidity, nonperitoneal abdomen   Musculoskeletal:      Right lower leg: No edema. Left lower leg: No edema. Skin:     General: Skin is warm and dry. Capillary Refill: Capillary refill takes less than 2 seconds. Coloration: Skin is not pale. Neurological:      Comments: Cranial nerves II-XII are equal and grossly intact with pupils equal and reactive with bilateral pupillary reflexes intact, no visual field deficits, intact extraocular motion, no facial motor deficit or droop, no facial sensory deficit, symmetrical palate elevation, intact tongue range of motion, good shoulder shrug and good neck range of motion, no finger-nose ataxia, no pronator drift, good hand grasp bilaterally, 5/5 strength in the bilateral upper and lower extremity, no sensory deficit in the upper or lower extremity. DIFFERENTIAL  DIAGNOSIS     PLAN (LABS / IMAGING / EKG):  Orders Placed This Encounter   Procedures    Culture, Urine    XR CHEST PORTABLE    CBC WITH AUTO DIFFERENTIAL    BASIC METABOLIC PANEL    Troponin    Brain Natriuretic Peptide    LIPASE    Urinalysis, reflex to microscopic    Microscopic Urinalysis    Measure post void residual    Orthostatic blood pressure and pulse    EKG 12 Lead       MEDICATIONS ORDERED:  Orders Placed This Encounter   Medications    ibuprofen (ADVIL;MOTRIN) tablet 800 mg    0.9 % sodium chloride bolus    cephALEXin (KEFLEX) capsule 500 mg     Order Specific Question:   Antimicrobial Indications     Answer:   Urinary Tract Infection    cephALEXin (KEFLEX) 500 MG capsule     Sig: Take 1 capsule by mouth 4 times daily for 7 days     Dispense:  28 capsule     Refill:  0       DDX: Orthostatic hypotension, cardiogenic syncope, neurogenic syncope, dehydration, chronic back pain, cauda equina, conus medullaris    Initial MDM/Plan: 77 y.o. female who presents with episode of near syncope that occurred today as well as 1 episode yesterday. Patient had acute onset of visual changes however did not totally syncopize. She believes she had her blood sugar may have been low as presented improved after drinking juice. Seen and examined she does appear uncomfortable most likely secondary to pain but no acute distress, no respiratory distress, nontoxic and not ill in appearance. Vital signs are unremarkable. Physical exam remarkable for dry oral mucosa however neurological exam unremarkable. Impression is most likely dehydration, orthostatic hypertension, possible hypoglycemia. Will screen for ACS and cardiac etiology of near syncope. Will obtain EKG, keep on telemetry, give fluids.   We will also obtain urinalysis given polyuria and sensation of incontinence as well as obtain postvoid residual to screen for cauda equina    DIAGNOSTIC RESULTS / EMERGENCY DEPARTMENT COURSE dehydration. Chest x-ray normal.  EKG without signs of arrhythmia, no interval prolongation. Post void residual 0 ruling out spinal cord pathology. Orthostatic vitals performed are within normal limits. Most likely dehydration, possibly hypoglycemia however patient sugar is normal here. U/a resulted and shows  wbcs,with polyuria and worsening back pain will provide treatment with keflex. Patient does have listed pencillin allergy however per chart review has tolerated keflex in the past.  Patient does feel improved and is okay with plan of discharge, she was instructed to follow-up with neurosurgeon for reevaluation of back pain and to return to the emergency department for new or worsening symptoms    PROCEDURES:  None    CONSULTS:  None  CRITICAL CARE:  Please see attending note    FINAL IMPRESSION      1. Near syncope    2. Dehydration    3. Chronic bilateral low back pain with right-sided sciatica    4. Urinary tract infection in female          DISPOSITION / PLAN     DISPOSITION Decision To Discharge 01/18/2022 07:33:33 PM        PATIENTREFERRED TO:  [facility]  [address] [address]  [phone]  Schedule an appointment as soon as possible for a visit   for back pain    Rosa Bledsoe MD  [phone] N.  [address], [address] [facility]    Schedule an appointment as soon as possible for a visit   for back pain      DISCHARGE MEDICATIONS:  New Prescriptions    CEPHALEXIN (KEFLEX) 500 MG CAPSULE    Take 1 capsule by mouth 4 times daily for 7 days       Anibal Castro DO  EmergencyMedicine Resident    (Please note that portions of this note were completed with a voice recognition program.  Efforts were made to edit the dictations but occasionally words are mis-transcribed.)          Anibal Castro DO  Resident  01/18/22 [id number]

## 2022-03-01 ENCOUNTER — APPOINTMENT (OUTPATIENT)
Dept: GENERAL RADIOLOGY | Age: 67
End: 2022-03-01
Payer: MEDICARE

## 2022-03-01 ENCOUNTER — HOSPITAL ENCOUNTER (EMERGENCY)
Age: 67
Discharge: HOME | End: 2022-03-01
Attending: EMERGENCY MEDICINE
Payer: MEDICARE

## 2022-03-01 VITALS
HEART RATE: 74 BPM | WEIGHT: 140 LBS | OXYGEN SATURATION: 96 % | SYSTOLIC BLOOD PRESSURE: 109 MMHG | DIASTOLIC BLOOD PRESSURE: 81 MMHG | BODY MASS INDEX: 21.93 KG/M2 | RESPIRATION RATE: 18 BRPM

## 2022-03-01 DIAGNOSIS — R10.11 ABDOMINAL PAIN, RIGHT UPPER QUADRANT: ICD-10-CM

## 2022-03-01 DIAGNOSIS — R19.7 DIARRHEA, UNSPECIFIED TYPE: Primary | ICD-10-CM

## 2022-03-01 LAB
ABSOLUTE EOS #: 0.05 K/UL (ref 0–0.44)
ABSOLUTE IMMATURE GRANULOCYTE: <0.03 K/UL (ref 0–0.3)
ABSOLUTE LYMPH #: 3.2 K/UL (ref 1.1–3.7)
ABSOLUTE MONO #: 0.48 K/UL (ref 0.1–1.2)
ALBUMIN SERPL-MCNC: 4.8 G/DL (ref 3.5–5.2)
ALBUMIN/GLOBULIN RATIO: 1.5 (ref 1–2.5)
ALP BLD-CCNC: 60 U/L (ref 35–104)
ALT SERPL-CCNC: 11 U/L (ref 5–33)
ANION GAP SERPL CALCULATED.3IONS-SCNC: 14 MMOL/L (ref 9–17)
AST SERPL-CCNC: 15 U/L
BASOPHILS # BLD: 0 % (ref 0–2)
BASOPHILS ABSOLUTE: 0.03 K/UL (ref 0–0.2)
BILIRUB SERPL-MCNC: 0.42 MG/DL (ref 0.3–1.2)
BILIRUBIN DIRECT: 0.09 MG/DL
BILIRUBIN, INDIRECT: 0.33 MG/DL (ref 0–1)
BUN BLDV-MCNC: 18 MG/DL (ref 8–23)
CALCIUM SERPL-MCNC: 9.9 MG/DL (ref 8.6–10.4)
CHLORIDE BLD-SCNC: 106 MMOL/L (ref 98–107)
CO2: 21 MMOL/L (ref 20–31)
CREAT SERPL-MCNC: 1.34 MG/DL (ref 0.5–0.9)
EOSINOPHILS RELATIVE PERCENT: 1 % (ref 1–4)
GFR AFRICAN AMERICAN: 48 ML/MIN
GFR NON-AFRICAN AMERICAN: 40 ML/MIN
GFR SERPL CREATININE-BSD FRML MDRD: ABNORMAL ML/MIN/{1.73_M2}
GLUCOSE BLD-MCNC: 88 MG/DL (ref 70–99)
HCT VFR BLD CALC: 41.7 % (ref 36.3–47.1)
HEMOGLOBIN: 13.7 G/DL (ref 11.9–15.1)
IMMATURE GRANULOCYTES: 0 %
LIPASE: 19 U/L (ref 13–60)
LYMPHOCYTES # BLD: 49 % (ref 24–43)
MCH RBC QN AUTO: 28.6 PG (ref 25.2–33.5)
MCHC RBC AUTO-ENTMCNC: 32.9 G/DL (ref 28.4–34.8)
MCV RBC AUTO: 87.1 FL (ref 82.6–102.9)
MONOCYTES # BLD: 7 % (ref 3–12)
NRBC AUTOMATED: 0 PER 100 WBC
PDW BLD-RTO: 13.7 % (ref 11.8–14.4)
PLATELET # BLD: 238 K/UL (ref 138–453)
PMV BLD AUTO: 11 FL (ref 8.1–13.5)
POTASSIUM SERPL-SCNC: 3.9 MMOL/L (ref 3.7–5.3)
RBC # BLD: 4.79 M/UL (ref 3.95–5.11)
SEG NEUTROPHILS: 43 % (ref 36–65)
SEGMENTED NEUTROPHILS ABSOLUTE COUNT: 2.89 K/UL (ref 1.5–8.1)
SODIUM BLD-SCNC: 141 MMOL/L (ref 135–144)
TOTAL PROTEIN: 8 G/DL (ref 6.4–8.3)
TROPONIN, HIGH SENSITIVITY: 11 NG/L (ref 0–14)
TROPONIN, HIGH SENSITIVITY: <6 NG/L (ref 0–14)
WBC # BLD: 6.7 K/UL (ref 3.5–11.3)

## 2022-03-01 PROCEDURE — 93005 ELECTROCARDIOGRAM TRACING: CPT | Performed by: STUDENT IN AN ORGANIZED HEALTH CARE EDUCATION/TRAINING PROGRAM

## 2022-03-01 PROCEDURE — 80076 HEPATIC FUNCTION PANEL: CPT

## 2022-03-01 PROCEDURE — 6370000000 HC RX 637 (ALT 250 FOR IP): Performed by: STUDENT IN AN ORGANIZED HEALTH CARE EDUCATION/TRAINING PROGRAM

## 2022-03-01 PROCEDURE — 80048 BASIC METABOLIC PNL TOTAL CA: CPT

## 2022-03-01 PROCEDURE — 99285 EMERGENCY DEPT VISIT HI MDM: CPT

## 2022-03-01 PROCEDURE — 85025 COMPLETE CBC W/AUTO DIFF WBC: CPT

## 2022-03-01 PROCEDURE — 96361 HYDRATE IV INFUSION ADD-ON: CPT

## 2022-03-01 PROCEDURE — 94664 DEMO&/EVAL PT USE INHALER: CPT

## 2022-03-01 PROCEDURE — 71045 X-RAY EXAM CHEST 1 VIEW: CPT

## 2022-03-01 PROCEDURE — 2580000003 HC RX 258: Performed by: STUDENT IN AN ORGANIZED HEALTH CARE EDUCATION/TRAINING PROGRAM

## 2022-03-01 PROCEDURE — 83690 ASSAY OF LIPASE: CPT

## 2022-03-01 PROCEDURE — 94640 AIRWAY INHALATION TREATMENT: CPT

## 2022-03-01 PROCEDURE — 84484 ASSAY OF TROPONIN QUANT: CPT

## 2022-03-01 PROCEDURE — 96360 HYDRATION IV INFUSION INIT: CPT

## 2022-03-01 RX ORDER — DICYCLOMINE HYDROCHLORIDE 10 MG/1
10 CAPSULE ORAL ONCE
Status: COMPLETED | OUTPATIENT
Start: 2022-03-01 | End: 2022-03-01

## 2022-03-01 RX ORDER — DICYCLOMINE HYDROCHLORIDE 10 MG/1
10 CAPSULE ORAL EVERY 6 HOURS PRN
Qty: 15 CAPSULE | Refills: 0 | Status: SHIPPED | OUTPATIENT
Start: 2022-03-01

## 2022-03-01 RX ORDER — IPRATROPIUM BROMIDE AND ALBUTEROL SULFATE 2.5; .5 MG/3ML; MG/3ML
1 SOLUTION RESPIRATORY (INHALATION) ONCE
Status: COMPLETED | OUTPATIENT
Start: 2022-03-01 | End: 2022-03-01

## 2022-03-01 RX ORDER — TIZANIDINE 2 MG/1
2 TABLET ORAL 3 TIMES DAILY PRN
Qty: 3 TABLET | Refills: 0 | Status: SHIPPED | OUTPATIENT
Start: 2022-03-01 | End: 2022-07-06 | Stop reason: SDUPTHER

## 2022-03-01 RX ORDER — 0.9 % SODIUM CHLORIDE 0.9 %
1000 INTRAVENOUS SOLUTION INTRAVENOUS ONCE
Status: COMPLETED | OUTPATIENT
Start: 2022-03-01 | End: 2022-03-01

## 2022-03-01 RX ORDER — IBUPROFEN 600 MG/1
600 TABLET ORAL EVERY 6 HOURS PRN
Qty: 5 TABLET | Refills: 0 | Status: SHIPPED | OUTPATIENT
Start: 2022-03-01

## 2022-03-01 RX ADMIN — IPRATROPIUM BROMIDE AND ALBUTEROL SULFATE 1 AMPULE: .5; 3 SOLUTION RESPIRATORY (INHALATION) at 18:32

## 2022-03-01 RX ADMIN — SODIUM CHLORIDE 1000 ML: 9 INJECTION, SOLUTION INTRAVENOUS at 18:15

## 2022-03-01 RX ADMIN — DICYCLOMINE HYDROCHLORIDE 10 MG: 10 CAPSULE ORAL at 18:14

## 2022-03-01 ASSESSMENT — ENCOUNTER SYMPTOMS
ABDOMINAL PAIN: 0
ABDOMINAL DISTENTION: 0
COUGH: 0
SINUS PRESSURE: 0
EYE ITCHING: 0
DIARRHEA: 0
NAUSEA: 0
EYE PAIN: 0
CONSTIPATION: 0
SINUS PAIN: 0
SORE THROAT: 0
SHORTNESS OF BREATH: 0

## 2022-03-01 NOTE — ED NOTES
Pt presented to ED via [facility] for the complaint of diarrhea following taking several doses of stool softeners and fleet enemas for constipation. Pt complains of right back pain. Pt denies chest pain. Pt denies shortness of breath.       Timi Drake RN  03/01/22 [id number]

## 2022-03-01 NOTE — ED PROVIDER NOTES
(gastroesophageal reflux disease), and H. pylori infection. has no past surgical history on file. Social History     Socioeconomic History    Marital status: Single     Spouse name: Not on file    Number of children: Not on file    Years of education: Not on file    Highest education level: Not on file   Occupational History    Not on file   Tobacco Use    Smoking status: Current Some Day Smoker     Packs/day: 0.50     Types: Cigarettes    Smokeless tobacco: Never Used   Substance and Sexual Activity    Alcohol use: Never    Drug use: Never    Sexual activity: Not on file   Other Topics Concern    Not on file   Social History Narrative    Not on file     Social Determinants of Health     Financial Resource Strain:     Difficulty of Paying Living Expenses: Not on file   Food Insecurity:     Worried About Running Out of Food in the Last Year: Not on file    [place]ina of Food in the Last Year: Not on file   Transportation Needs:     Lack of Transportation (Medical): Not on file    Lack of Transportation (Non-Medical):  Not on file   Physical Activity:     Days of Exercise per Week: Not on file    Minutes of Exercise per Session: Not on file   Stress:     Feeling of Stress : Not on file   Social Connections:     Frequency of Communication with Friends and Family: Not on file    Frequency of Social Gatherings with Friends and Family: Not on file    Attends [employer] witness Services: Not on file    Active Member of [address] [place] [address] [employer] or Organizations: Not on file    Attends Club or Organization Meetings: Not on file    Marital Status: Not on file   Intimate Partner Violence:     Fear of Current or Ex-Partner: Not on file    Emotionally Abused: Not on file    Physically Abused: Not on file    Sexually Abused: Not on file   Housing Stability:     Unable to Pay for Housing in the Last Year: Not on file    Number of [place] in the Last Year: Not on file    Unstable Housing in the Last Year: Not on file       History reviewed. No pertinent family history. Allergies:  Gadolinium, Iodides, Ketorolac, Metronidazole, Propoxyphene, Tetanus-diphtheria toxoids td, Tuberculin tests, Benadryl [diphenhydramine], Decadron [dexamethasone], Gadolinium derivatives, Iodinated diagnostic agents, Iodine, Penicillins, Tetanus toxoids, and Other    Home Medications:  Prior to Admission medications    Medication Sig Start Date End Date Taking? Authorizing Provider   dicyclomine (BENTYL) 10 MG capsule Take 1 capsule by mouth every 6 hours as needed (abdominal cramps) 3/1/22  Yes Kathia Nunez, DO   tiZANidine (ZANAFLEX) 2 MG tablet Take 1 tablet by mouth 3 times daily as needed (Muscle spasm) 3/1/22  Yes Kathia Nunez DO   ibuprofen (ADVIL;MOTRIN) 600 MG tablet Take 1 tablet by mouth every 6 hours as needed for Pain 3/1/22  Yes Kathia Nunez, DO   acetaminophen (TYLENOL) 500 MG tablet Take 1 tablet by mouth every 6 hours as needed for Pain 8/7/21 8/27/21  Salena Lowery, DO   gabapentin (NEURONTIN) 300 MG capsule Take 1 capsule by mouth 2 times daily for 30 days. 4/10/21 5/10/21  Art Lenz MD   famotidine (PEPCID) 40 MG tablet Take 1 tablet by mouth daily 4/10/21   Art Lenz MD   ALPRAZolam Lytton Alvo) 0.25 MG tablet Take 1 tablet by mouth nightly as needed for Sleep. 4/10/21 4/10/22  Art Lenz MD   albuterol sulfate HFA (VENTOLIN HFA) 108 (90 Base) MCG/ACT inhaler Inhale 2 puffs into the lungs 4 times daily as needed for Wheezing 4/10/21   Art Lenz MD   Elastic Bandages & Supports (LUMBAR BACK BRACE/SUPPORT PAD) MISC 1 Units by Does not apply route as needed (PNEUMATIC OFFLOADING BACK BRACE) 12/11/20 12/11/21  Therese Holley MD       REVIEW OF SYSTEMS    (2-9 systems for level 4, 10 or more for level 5)      Review of Systems   Constitutional: Negative for activity change, chills and fever. HENT: Negative for congestion, sinus pressure, sinus pain and sore throat. Eyes: Negative for pain and itching. Respiratory: Negative for cough and shortness of breath. Cardiovascular: Negative for chest pain. Gastrointestinal: Negative for abdominal distention, abdominal pain, constipation, diarrhea and nausea. Endocrine: Negative for polyuria. Genitourinary: Negative for dysuria and frequency. Musculoskeletal: Negative for arthralgias. Skin: Negative for rash. Neurological: Negative for light-headedness and headaches. PHYSICAL EXAM   (up to 7 for level 4, 8 or more for level 5)      INITIAL VITALS:   /81   Pulse 74   Resp 18   Wt 140 lb (63.5 kg)   SpO2 96%   BMI 21.93 kg/m²     Physical Exam  Vitals reviewed. Constitutional:       General: She is not in acute distress. HENT:      Head: Normocephalic and atraumatic. Ears:      Comments: Hearing grossly normal     Nose: Nose normal.      Mouth/Throat:      Mouth: Mucous membranes are moist.      Pharynx: Oropharynx is clear. Comments: Oropharynx is nonerythematous with no exudates. Uvula is midline. Eyes:      General: No scleral icterus. Conjunctiva/sclera: Conjunctivae normal.      Pupils: Pupils are equal, round, and reactive to light. Cardiovascular:      Rate and Rhythm: Normal rate and regular rhythm. Pulses: Normal pulses. Pulmonary:      Effort: Pulmonary effort is normal. No respiratory distress. Breath sounds: Normal breath sounds. Abdominal:      General: Abdomen is flat. There is no distension. Palpations: Abdomen is soft. Tenderness: There is no abdominal tenderness. There is no right CVA tenderness, left CVA tenderness or guarding. Comments: TTP right upper quadrant. No peritoneal signs. Musculoskeletal:      Cervical back: Neck supple. No muscular tenderness. Right lower leg: No edema. Left lower leg: No edema. Skin:     General: Skin is warm and dry. Capillary Refill: Capillary refill takes less than 2 seconds.    Neurological:      General: No focal deficit present. Mental Status: She is alert and oriented to person, place, and time. Mental status is at baseline.          DIFFERENTIAL  DIAGNOSIS     PLAN (LABS / IMAGING / EKG):  Orders Placed This Encounter   Procedures    XR CHEST PORTABLE    Hepatic Function Panel    Lipase    Basic Metabolic Panel    CBC with Auto Differential    Troponin    Troponin    EKG 12 Lead       MEDICATIONS ORDERED:  Orders Placed This Encounter   Medications    0.9 % sodium chloride bolus    ipratropium-albuterol (DUONEB) nebulizer solution 1 ampule     Order Specific Question:   Initiate RT Bronchodilator Protocol     Answer:   No    dicyclomine (BENTYL) capsule 10 mg    dicyclomine (BENTYL) 10 MG capsule     Sig: Take 1 capsule by mouth every 6 hours as needed (abdominal cramps)     Dispense:  15 capsule     Refill:  0    tiZANidine (ZANAFLEX) 2 MG tablet     Sig: Take 1 tablet by mouth 3 times daily as needed (Muscle spasm)     Dispense:  3 tablet     Refill:  0    ibuprofen (ADVIL;MOTRIN) 600 MG tablet     Sig: Take 1 tablet by mouth every 6 hours as needed for Pain     Dispense:  5 tablet     Refill:  0       DIAGNOSTIC RESULTS / EMERGENCY DEPARTMENT COURSE / MDM   LAB RESULTS:  Results for orders placed or performed during the hospital encounter of 03/01/22   Hepatic Function Panel   Result Value Ref Range    Albumin 4.8 3.5 - 5.2 g/dL    Alkaline Phosphatase 60 35 - 104 U/L    ALT 11 5 - 33 U/L    AST 15 <32 U/L    Total Bilirubin 0.42 0.3 - 1.2 mg/dL    Bilirubin, Direct 0.09 <0.31 mg/dL    Bilirubin, Indirect 0.33 0.00 - 1.00 mg/dL    Total Protein 8.0 6.4 - 8.3 g/dL    Albumin/Globulin Ratio 1.5 1.0 - 2.5   Lipase   Result Value Ref Range    Lipase 19 13 - 60 U/L   Basic Metabolic Panel   Result Value Ref Range    Glucose 88 70 - 99 mg/dL    BUN 18 8 - 23 mg/dL    CREATININE 1.34 (H) 0.50 - 0.90 mg/dL    Calcium 9.9 8.6 - 10.4 mg/dL    Sodium 141 135 - 144 mmol/L    Potassium 3.9 3.7 - 5.3 mmol/L    Chloride 106 98 - 107 mmol/L    CO2 21 20 - 31 mmol/L    Anion Gap 14 9 - 17 mmol/L    GFR Non-African American 40 (L) >60 mL/min    GFR  48 (L) >60 mL/min    GFR Comment         CBC with Auto Differential   Result Value Ref Range    WBC 6.7 3.5 - 11.3 k/uL    RBC 4.79 3.95 - 5.11 m/uL    Hemoglobin 13.7 11.9 - 15.1 g/dL    Hematocrit 41.7 36.3 - 47.1 %    MCV 87.1 82.6 - 102.9 fL    MCH 28.6 25.2 - 33.5 pg    MCHC 32.9 28.4 - 34.8 g/dL    RDW 13.7 11.8 - 14.4 %    Platelets 004 392 - 300 k/uL    MPV 11.0 8.1 - 13.5 fL    NRBC Automated 0.0 0.0 per 100 WBC    Seg Neutrophils 43 36 - 65 %    Lymphocytes 49 (H) 24 - 43 %    Monocytes 7 3 - 12 %    Eosinophils % 1 1 - 4 %    Basophils 0 0 - 2 %    Immature Granulocytes 0 0 %    Segs Absolute 2.89 1.50 - 8.10 k/uL    Absolute Lymph # 3.20 1.10 - 3.70 k/uL    Absolute Mono # 0.48 0.10 - 1.20 k/uL    Absolute Eos # 0.05 0.00 - 0.44 k/uL    Basophils Absolute 0.03 0.00 - 0.20 k/uL    Absolute Immature Granulocyte <0.03 0.00 - 0.30 k/uL   Troponin   Result Value Ref Range    Troponin, High Sensitivity 11 0 - 14 ng/L   Troponin   Result Value Ref Range    Troponin, High Sensitivity <6 0 - 14 ng/L       IMPRESSION: Tr Schulz is a 77 y.o. woman presenting for multiple complaints but the main one being diarrhea and RUQ abdominal pain. Will check electrolytes, renal function. She is complaining of chest pain as well. Low concern for ACS at this time but will complete cardiac workup as well. Doubt bowel obstruction, hepatitis, cholecystitis, or pancreatitis. RADIOLOGY:  XR CHEST PORTABLE    Result Date: 3/1/2022  Hyperinflation suggesting COPD and chronic basilar changes. Heart size is stable, with borderline cardiomegaly.       EKG  Normal rate, sinus, normal axis, no ST segment elevations or depressions, T wave inversions in aVR and V1, evidence of left atrial enlargement, good R wave progression    All EKG's are interpreted by the Emergency Department Physician who either signs or Co-signs this chart in the absence of a cardiologist.    EMERGENCY DEPARTMENT COURSE:  Patient seen and evaluated, VSS and nontoxic in appearance. Analgesia offered with good effect. ED work-up demonstrates normal LFTs, normal lipase, undetectable troponin. Blood counts are normal.  Creatinine 1.34 which is similar to levels in January. EKG and chest x-ray unremarkable. Discussed findings with patient. She is requesting refills on home medications but agrees to follow up with PCP for further medication prescriptions. Patient understands to return to the emergency department for any new or worsening symptoms and to see their PCP regarding hospital follow up. PROCEDURES:  none    CONSULTS:  None    CRITICAL CARE:  See attending note    FINAL IMPRESSION      1. Diarrhea, unspecified type    2.  Abdominal pain, right upper quadrant          DISPOSITION / PLAN     DISPOSITION Decision To Discharge 03/01/2022 07:34:19 PM      PATIENT REFERRED TO:  LORRAINE Hayden [address] [facility] [address] [place] [address]  [phone]      As needed, If symptoms worsen    [facility]  [address] [place] [address]  [phone]    As needed, If symptoms worsen      DISCHARGE MEDICATIONS:  Discharge Medication List as of 3/1/2022  7:40 PM      START taking these medications    Details   dicyclomine (BENTYL) 10 MG capsule Take 1 capsule by mouth every 6 hours as needed (abdominal cramps), Disp-15 capsule, R-0Print             Rocky Rene DO  Emergency Medicine Resident    (Please note that portions of thisnote were completed with a voice recognition program.  Efforts were made to edit the dictations but occasionally words are mis-transcribed.)       Rocky Rene DO  Resident  03/02/22 [id number]

## 2022-03-01 NOTE — ED NOTES
Pt requesting to leave. Pt verbally aggressive yelling at RN and charge RN.       Anita Epley, RN  03/01/22 [id number]

## 2022-03-01 NOTE — ED PROVIDER NOTES
[facility]     Emergency Department     Faculty Attestation    I performed a history and physical examination of the patient and discussed management with the resident. I reviewed the residents note and agree with the documented findings and plan of care. Any areas of disagreement are noted on the chart. I was personally present for the key portions of any procedures. I have documented in the chart those procedures where I was not present during the key portions. I have reviewed the emergency nurses triage note. I agree with the chief complaint, past medical history, past surgical history, allergies, medications, social and family history as documented unless otherwise noted below. For Physician Assistant/ Nurse Practitioner cases/documentation I have personally evaluated this patient and have completed at least one if not all key elements of the E/M (history, physical exam, and MDM). Additional findings are as noted. This patient was evaluated in the Emergency Department for symptoms described in the history of present illness. He/she was evaluated in the context of the global COVID-19 pandemic, which necessitated consideration that the patient might be at risk for infection with the SARS-CoV-2 virus that causes COVID-19. Institutional protocols and algorithms that pertain to the evaluation of patients at risk for COVID-19 are in a state of rapid change based on information released by regulatory bodies including the CDC and federal and state organizations. These policies and algorithms were followed during the patient's care in the ED. Patient here with lightheadedness diarrhea. Patient states she was constipated last week history of constipation, had been straining with stools for several days, yesterday took laxatives and give her self an enema now today has diarrhea. Does complain of mild right-sided abdominal pain no nausea vomiting.   Did not lose consciousness no chest pain no shortness of breath. On exam well-appearing nontoxic watching TV. Lungs clear heart normal abdomen soft mild to moderate right upper tenderness but no pulsatile abdominal mass distal pulses intact all 4 extremities. Will check labs again chest x-ray reevaluate need for advanced imaging. EKG interpretation: Sinus rhythm 73. Normal intervals normal axis no acute ST or T changes. No acute findings, similar to 1/18/2022.     Critical Care     none    Rocael Marin MD, [facility]  Attending Emergency  Physician             Rocael Marin MD  03/01/22 [id number]

## 2022-03-02 LAB
EKG ATRIAL RATE: 73 BPM
EKG P AXIS: 77 DEGREES
EKG P-R INTERVAL: 170 MS
EKG Q-T INTERVAL: 376 MS
EKG QRS DURATION: 78 MS
EKG QTC CALCULATION (BAZETT): 414 MS
EKG R AXIS: 39 DEGREES
EKG T AXIS: 47 DEGREES
EKG VENTRICULAR RATE: 73 BPM

## 2022-03-02 PROCEDURE — 93010 ELECTROCARDIOGRAM REPORT: CPT | Performed by: INTERNAL MEDICINE

## 2022-03-02 NOTE — ED NOTES
Report received from [facility]. Pt resting in bed. NAD at this time.  Will continue to monitor       Ja Lau RN  03/01/22 [id number]

## 2022-07-06 ENCOUNTER — HOSPITAL ENCOUNTER (EMERGENCY)
Age: 67
Discharge: HOME | End: 2022-07-06
Attending: EMERGENCY MEDICINE
Payer: MEDICARE

## 2022-07-06 ENCOUNTER — APPOINTMENT (OUTPATIENT)
Dept: CT IMAGING | Age: 67
End: 2022-07-06
Payer: MEDICARE

## 2022-07-06 ENCOUNTER — APPOINTMENT (OUTPATIENT)
Dept: GENERAL RADIOLOGY | Age: 67
End: 2022-07-06
Payer: MEDICARE

## 2022-07-06 VITALS
WEIGHT: 125 LBS | OXYGEN SATURATION: 97 % | RESPIRATION RATE: 18 BRPM | HEIGHT: 66 IN | SYSTOLIC BLOOD PRESSURE: 133 MMHG | DIASTOLIC BLOOD PRESSURE: 89 MMHG | HEART RATE: 66 BPM | TEMPERATURE: 97.2 F | BODY MASS INDEX: 20.09 KG/M2

## 2022-07-06 DIAGNOSIS — R07.89 ATYPICAL CHEST PAIN: Primary | ICD-10-CM

## 2022-07-06 DIAGNOSIS — G89.29 CHRONIC INTRACTABLE HEADACHE, UNSPECIFIED HEADACHE TYPE: ICD-10-CM

## 2022-07-06 DIAGNOSIS — R51.9 CHRONIC INTRACTABLE HEADACHE, UNSPECIFIED HEADACHE TYPE: ICD-10-CM

## 2022-07-06 LAB
ABSOLUTE EOS #: 0.06 K/UL (ref 0–0.44)
ABSOLUTE IMMATURE GRANULOCYTE: <0.03 K/UL (ref 0–0.3)
ABSOLUTE LYMPH #: 3.55 K/UL (ref 1.1–3.7)
ABSOLUTE MONO #: 0.52 K/UL (ref 0.1–1.2)
ANION GAP SERPL CALCULATED.3IONS-SCNC: 13 MMOL/L (ref 9–17)
BASOPHILS # BLD: 0 % (ref 0–2)
BASOPHILS ABSOLUTE: 0.03 K/UL (ref 0–0.2)
BUN BLDV-MCNC: 18 MG/DL (ref 8–23)
CALCIUM SERPL-MCNC: 9.7 MG/DL (ref 8.6–10.4)
CHLORIDE BLD-SCNC: 100 MMOL/L (ref 98–107)
CO2: 26 MMOL/L (ref 20–31)
CREAT SERPL-MCNC: 0.88 MG/DL (ref 0.5–0.9)
EOSINOPHILS RELATIVE PERCENT: 1 % (ref 1–4)
GFR AFRICAN AMERICAN: >60 ML/MIN
GFR NON-AFRICAN AMERICAN: >60 ML/MIN
GFR SERPL CREATININE-BSD FRML MDRD: ABNORMAL ML/MIN/{1.73_M2}
GLUCOSE BLD-MCNC: 69 MG/DL (ref 70–99)
HCT VFR BLD CALC: 41.4 % (ref 36.3–47.1)
HEMOGLOBIN: 13.7 G/DL (ref 11.9–15.1)
IMMATURE GRANULOCYTES: 0 %
LYMPHOCYTES # BLD: 52 % (ref 24–43)
MCH RBC QN AUTO: 29.1 PG (ref 25.2–33.5)
MCHC RBC AUTO-ENTMCNC: 33.1 G/DL (ref 28.4–34.8)
MCV RBC AUTO: 88.1 FL (ref 82.6–102.9)
MONOCYTES # BLD: 8 % (ref 3–12)
NRBC AUTOMATED: 0 PER 100 WBC
PDW BLD-RTO: 13.9 % (ref 11.8–14.4)
PLATELET # BLD: 239 K/UL (ref 138–453)
PMV BLD AUTO: 10.5 FL (ref 8.1–13.5)
POTASSIUM SERPL-SCNC: 3.9 MMOL/L (ref 3.7–5.3)
RBC # BLD: 4.7 M/UL (ref 3.95–5.11)
SEG NEUTROPHILS: 39 % (ref 36–65)
SEGMENTED NEUTROPHILS ABSOLUTE COUNT: 2.65 K/UL (ref 1.5–8.1)
SODIUM BLD-SCNC: 139 MMOL/L (ref 135–144)
TROPONIN, HIGH SENSITIVITY: 8 NG/L (ref 0–14)
TROPONIN, HIGH SENSITIVITY: <6 NG/L (ref 0–14)
WBC # BLD: 6.8 K/UL (ref 3.5–11.3)

## 2022-07-06 PROCEDURE — 84484 ASSAY OF TROPONIN QUANT: CPT

## 2022-07-06 PROCEDURE — 96374 THER/PROPH/DIAG INJ IV PUSH: CPT

## 2022-07-06 PROCEDURE — 6370000000 HC RX 637 (ALT 250 FOR IP)

## 2022-07-06 PROCEDURE — 80048 BASIC METABOLIC PNL TOTAL CA: CPT

## 2022-07-06 PROCEDURE — 6360000002 HC RX W HCPCS

## 2022-07-06 PROCEDURE — 93005 ELECTROCARDIOGRAM TRACING: CPT

## 2022-07-06 PROCEDURE — 71046 X-RAY EXAM CHEST 2 VIEWS: CPT

## 2022-07-06 PROCEDURE — 70450 CT HEAD/BRAIN W/O DYE: CPT

## 2022-07-06 PROCEDURE — 85025 COMPLETE CBC W/AUTO DIFF WBC: CPT

## 2022-07-06 PROCEDURE — 99285 EMERGENCY DEPT VISIT HI MDM: CPT

## 2022-07-06 RX ORDER — TIZANIDINE 2 MG/1
2 TABLET ORAL 3 TIMES DAILY PRN
Qty: 10 TABLET | Refills: 0 | Status: SHIPPED | OUTPATIENT
Start: 2022-07-06

## 2022-07-06 RX ORDER — IBUPROFEN 400 MG/1
400 TABLET ORAL ONCE
Status: DISCONTINUED | OUTPATIENT
Start: 2022-07-06 | End: 2022-07-06

## 2022-07-06 RX ORDER — MORPHINE SULFATE 4 MG/ML
2 INJECTION, SOLUTION INTRAMUSCULAR; INTRAVENOUS ONCE
Status: COMPLETED | OUTPATIENT
Start: 2022-07-06 | End: 2022-07-06

## 2022-07-06 RX ORDER — IBUPROFEN 400 MG/1
400 TABLET ORAL ONCE
Status: COMPLETED | OUTPATIENT
Start: 2022-07-06 | End: 2022-07-06

## 2022-07-06 RX ADMIN — IBUPROFEN 400 MG: 400 TABLET, FILM COATED ORAL at 14:26

## 2022-07-06 RX ADMIN — MORPHINE SULFATE 2 MG: 4 INJECTION INTRAVENOUS at 17:39

## 2022-07-06 ASSESSMENT — PAIN SCALES - GENERAL
PAINLEVEL_OUTOF10: 8
PAINLEVEL_OUTOF10: 8
PAINLEVEL_OUTOF10: 10
PAINLEVEL_OUTOF10: 10

## 2022-07-06 ASSESSMENT — PAIN DESCRIPTION - LOCATION
LOCATION: HEAD
LOCATION: NECK;CHEST
LOCATION: HEAD

## 2022-07-06 ASSESSMENT — PAIN DESCRIPTION - DESCRIPTORS
DESCRIPTORS: STABBING
DESCRIPTORS: SHARP
DESCRIPTORS: TIGHTNESS

## 2022-07-06 ASSESSMENT — PAIN DESCRIPTION - ORIENTATION
ORIENTATION: RIGHT
ORIENTATION: ANTERIOR

## 2022-07-06 ASSESSMENT — ENCOUNTER SYMPTOMS
SHORTNESS OF BREATH: 0
PHOTOPHOBIA: 0
BACK PAIN: 1

## 2022-07-06 ASSESSMENT — PAIN - FUNCTIONAL ASSESSMENT
PAIN_FUNCTIONAL_ASSESSMENT: 0-10
PAIN_FUNCTIONAL_ASSESSMENT: 0-10

## 2022-07-06 ASSESSMENT — PAIN DESCRIPTION - PAIN TYPE: TYPE: ACUTE PAIN

## 2022-07-06 NOTE — ED NOTES
Pt given resources on walk-in mental health clinics     [facility]  07/06/22 [id number]

## 2022-07-06 NOTE — ED PROVIDER NOTES
[facility]  ED  Emergency Department Encounter  EmergencyMedicine Resident     Pt Gilma Isblel  MRN: [id number]  [place] 1955  Date of evaluation: 7/6/22  PCP:  LORRAINE Montague    This patient was evaluated in the Emergency Department for symptoms described in the history of present illness. The patient was evaluated in the context of the global COVID-19 pandemic, which necessitated consideration that the patient might be at risk for infection with the SARS-CoV-2 virus that causes COVID-19. Institutional protocols and algorithms that pertain to the evaluation of patients at risk for COVID-19 are in a state of rapid change based on information released by regulatory bodies including the CDC and federal and state organizations. These policies and algorithms were followed during the patient's care in the ED. CHIEF COMPLAINT       Chief Complaint   Patient presents with    Chest Pain     began couple weeks ago     Tingling     \"passed out\" 1 month ago, +LOC, never got seen afterwards        HISTORY OF PRESENT ILLNESS  (Location/Symptom, Timing/Onset, Context/Setting, Quality, Duration, Modifying Factors, Severity.)      Cholo Sexton is a 77 y.o. female who presents with severe (10/10) intermittent chest pain the past few months with 1 episode of syncope. Patient denies any palpitations, shortness of breath, or exacerbating triggers including exertion. Patient says the pain has previously responded to \"Advil or ibuprofen, not tramadol\". Patient also complains of full body electrical type pain starting in the right shoulder then left and periodically alternating between both shoulders worse when elevating either shoulder above 90 degrees. She further endorses a background of chronic lower back pain. Patient also endorses history of chronic headaches worsening over the past month following a fall (<2m).  At the time of the fall, patient believes she lost consciousness, denies anticoagulation, denies head laceration, denies post-ictal confusion, denies incontinence, denies tongue biting. States she was changing a light bulb, felt faint and fell. PAST MEDICAL / SURGICAL / SOCIAL / FAMILY HISTORY      has a past medical history of Emphysema (subcutaneous) (surgical) resulting from a procedure, GERD (gastroesophageal reflux disease), and H. pylori infection. Patient states that she spent 1 month in a cardiac unit in [place], but declined to provide additional information. has no past surgical history on file. Social History     Socioeconomic History    Marital status: Single     Spouse name: Not on file    Number of children: Not on file    Years of education: Not on file    Highest education level: Not on file   Occupational History    Not on file   Tobacco Use    Smoking status: Current Some Day Smoker     Packs/day: 0.50     Types: Cigarettes    Smokeless tobacco: Never Used   Substance and Sexual Activity    Alcohol use: Never    Drug use: Never    Sexual activity: Not on file   Other Topics Concern    Not on file   Social History Narrative    Not on file     Social Determinants of Health     Financial Resource Strain:     Difficulty of Paying Living Expenses: Not on file   Food Insecurity:     Worried About Running Out of Food in the Last Year: Not on file    Catalina of Food in the Last Year: Not on file   Transportation Needs:     Lack of Transportation (Medical): Not on file    Lack of Transportation (Non-Medical):  Not on file   Physical Activity:     Days of Exercise per Week: Not on file    Minutes of Exercise per Session: Not on file   Stress:     Feeling of Stress : Not on file   Social Connections:     Frequency of Communication with Friends and Family: Not on file    Frequency of Social Gatherings with Friends and Family: Not on file    Attends Baptism Services: Not on file   [employer] of [employer]s or Organizations: Not on file    Attends Club or Organization Meetings: Not on file    Marital Status: Not on file   Intimate Partner Violence:     Fear of Current or Ex-Partner: Not on file    Emotionally Abused: Not on file    Physically Abused: Not on file    Sexually Abused: Not on file   Housing Stability:     Unable to Pay for Housing in the Last Year: Not on file    Number of Shania in the Last Year: Not on file    Unstable Housing in the Last Year: Not on file       History reviewed. No pertinent family history. Allergies:  Gadolinium, Iodides, Ketorolac, Metronidazole, Propoxyphene, Tetanus-diphtheria toxoids td, Tuberculin tests, Benadryl [diphenhydramine], Decadron [dexamethasone], Gadolinium derivatives, Iodinated diagnostic agents, Iodine, Penicillins, Tetanus toxoids, and Other    Home Medications:  Prior to Admission medications    Medication Sig Start Date End Date Taking? Authorizing Provider   dicyclomine (BENTYL) 10 MG capsule Take 1 capsule by mouth every 6 hours as needed (abdominal cramps) 3/1/22   Kathia Nunez, DO   tiZANidine (ZANAFLEX) 2 MG tablet Take 1 tablet by mouth 3 times daily as needed (Muscle spasm) 3/1/22   Kathia Nunez, DO   ibuprofen (ADVIL;MOTRIN) 600 MG tablet Take 1 tablet by mouth every 6 hours as needed for Pain 3/1/22   Kathia Nunez, DO   acetaminophen (TYLENOL) 500 MG tablet Take 1 tablet by mouth every 6 hours as needed for Pain 8/7/21 8/27/21  Darkizzy Beecham, DO   gabapentin (NEURONTIN) 300 MG capsule Take 1 capsule by mouth 2 times daily for 30 days.  4/10/21 5/10/21  Tim Nicole MD   famotidine (PEPCID) 40 MG tablet Take 1 tablet by mouth daily 4/10/21   Tim Nicole MD   albuterol sulfate HFA (VENTOLIN HFA) 108 (90 Base) MCG/ACT inhaler Inhale 2 puffs into the lungs 4 times daily as needed for Wheezing 4/10/21   Tim Nicole MD   Elastic Bandages & Supports (LUMBAR BACK BRACE/SUPPORT PAD) MISC 1 Units by Does not apply route as needed (PNEUMATIC OFFLOADING BACK BRACE) 12/11/20 12/11/21  Fariba Sandoval MD       REVIEW OF SYSTEMS    (2-9 systems for level 4, 10 or more for level 5)      Review of Systems   Constitutional: Positive for fatigue and unexpected weight change. Negative for diaphoresis. Eyes: Negative for photophobia and visual disturbance. Respiratory: Negative for shortness of breath. Cardiovascular: Positive for chest pain. Negative for palpitations and leg swelling. Musculoskeletal: Positive for back pain and myalgias. Neurological: Positive for syncope and headaches. PHYSICAL EXAM   (up to 7 for level 4, 8 or more for level 5)      INITIAL VITALS:   /89   Pulse 66   Temp 97.2 °F (36.2 °C) (Oral)   Resp 19   Ht 5' 6\" (1.676 m)   Wt 125 lb (56.7 kg)   SpO2 97%   BMI 20.18 kg/m²     Physical Exam  Constitutional:       Appearance: She is cachectic. Cardiovascular:      Rate and Rhythm: Normal rate and regular rhythm. Pulses: Normal pulses. Heart sounds: Normal heart sounds. No murmur heard. Pulmonary:      Effort: Pulmonary effort is normal. No respiratory distress. Breath sounds: Normal breath sounds and air entry. No stridor. No wheezing. Chest:      Chest wall: No tenderness. Musculoskeletal:      Left lower leg: No edema. Skin:     Findings: Rash present. Neurological:      General: No focal deficit present. Mental Status: She is alert and oriented to person, place, and time. GCS: GCS eye subscore is 4. GCS verbal subscore is 5. GCS motor subscore is 6. Cranial Nerves: Cranial nerves are intact. Sensory: Sensation is intact. Motor: Weakness present. Coordination: Coordination is intact. Deep Tendon Reflexes:      Reflex Scores:       Bicep reflexes are 2+ on the right side and 2+ on the left side. Achilles reflexes are 2+ on the right side and 2+ on the left side.         DIFFERENTIAL  DIAGNOSIS     PLAN (LABS / IMAGING / EKG):  Orders Placed This Encounter   Procedures    XR CHEST (2 VW)    CBC with Auto Differential    Basic Metabolic Panel    Troponin    Continuous Pulse Oximetry    EKG 12 Lead       MEDICATIONS ORDERED:  Orders Placed This Encounter   Medications    ibuprofen (ADVIL;MOTRIN) tablet 400 mg       DDX:   ACS, arrhythmia, aortic dissection, PE, pneumonia were considered and excluded as causes for chest discomfort. Epidural and subdural hematomas were considered and excluded as causes for headaches following fall.     DIAGNOSTIC RESULTS / EMERGENCY DEPARTMENT COURSE / MDM   LAB RESULTS:  Results for orders placed or performed during the hospital encounter of 07/06/22   CBC with Auto Differential   Result Value Ref Range    WBC 6.8 3.5 - 11.3 k/uL    RBC 4.70 3.95 - 5.11 m/uL    Hemoglobin 13.7 11.9 - 15.1 g/dL    Hematocrit 41.4 36.3 - 47.1 %    MCV 88.1 82.6 - 102.9 fL    MCH 29.1 25.2 - 33.5 pg    MCHC 33.1 28.4 - 34.8 g/dL    RDW 13.9 11.8 - 14.4 %    Platelets 569 556 - 350 k/uL    MPV 10.5 8.1 - 13.5 fL    NRBC Automated 0.0 0.0 per 100 WBC    Seg Neutrophils 39 36 - 65 %    Lymphocytes 52 (H) 24 - 43 %    Monocytes 8 3 - 12 %    Eosinophils % 1 1 - 4 %    Basophils 0 0 - 2 %    Immature Granulocytes 0 0 %    Segs Absolute 2.65 1.50 - 8.10 k/uL    Absolute Lymph # 3.55 1.10 - 3.70 k/uL    Absolute Mono # 0.52 0.10 - 1.20 k/uL    Absolute Eos # 0.06 0.00 - 0.44 k/uL    Basophils Absolute 0.03 0.00 - 0.20 k/uL    Absolute Immature Granulocyte <0.03 0.00 - 0.30 k/uL   Basic Metabolic Panel   Result Value Ref Range    Glucose 69 (L) 70 - 99 mg/dL    BUN 18 8 - 23 mg/dL    CREATININE 0.88 0.50 - 0.90 mg/dL    Calcium 9.7 8.6 - 10.4 mg/dL    Sodium 139 135 - 144 mmol/L    Potassium 3.9 3.7 - 5.3 mmol/L    Chloride 100 98 - 107 mmol/L    CO2 26 20 - 31 mmol/L    Anion Gap 13 9 - 17 mmol/L    GFR Non-African American >60 >60 mL/min    GFR African American >60 >60 mL/min    GFR Comment         Troponin   Result Value Ref Range Troponin, High Sensitivity 8 0 - 14 ng/L       IMPRESSION:  Chest pain is non-specific in origin; emergent and urgent diagnoses listed above were excluded by clinical exam and investigations. Intractable headaches present for months and patient encouraged to follow-up with PCP within the next few days. Epi and subdural hematomas were considered and excluded. RADIOLOGY:  CT head showed no evidence of epi or subdural hematoma, acute intracranial hemorrhage, acute infarct, mass effect or  midline shift.      EKG    Normal sinus rhythm, 73 bpm with normal intervals, no ectopy, and no ST changes. All EKG's are interpreted by the Emergency Department Physician who either signs or Co-signs this chart in the absence of a cardiologist.    EMERGENCY DEPARTMENT COURSE:  15:10 Patient given 400mg ibuprofen PO for pain which she states she has used previously to successfully alleviate the pain. 15:17 Patient informed of results of her CXR, ECG and was reassured there are no abnormalities. Informed waiting on bloods. 15:43 Patient informed of her reassuring blood work results and the need for a CT head on b/g of fall roughly one month ago. Agreed for CT head w/o contrast.  17:19 Patient complained of increased pain on resting in bed. Given 400mg of ibuprofen PO and agreed to sit in chair which was more bearable for her pain. 18:08 Patient informed results of CT head were reassuring. Informed to follow-up with PCP for ongoing headaches and chest pain and to return to ED if symptoms worsen. Given rx for 10 tablets of zanaflex 2mg for chronic back spasms. [unfilled]    No notes of EC Admission Criteria type on file. CONSULTS:  None    FINAL IMPRESSION      No diagnosis found. DISPOSITION / PLAN     DISPOSITION        PATIENT REFERRED TO:  No follow-up provider specified.     DISCHARGE MEDICATIONS:  New Prescriptions    No medications on file       Enoch Olmedo MD  Emergency Medicine Resident    (Please note that portions of thisnote were completed with a voice recognition program.  Efforts were made to edit the dictations but occasionally words are mis-transcribed.)       Latoya Arevalo MD  Resident  07/06/22 9025

## 2022-07-06 NOTE — ED NOTES
The following labs labeled with pt sticker and tubed to lab:     [x] Blue     [x] Lavender   [] on ice  [x] Green/yellow  [x] Green/black [] on ice  [x] Yellow  [] Red  [] Pink      [] COVID-19 swab    [] Rapid  [] PCR  [] Flu Swab  [] Strep Swab  [] Peds Viral Panel     [] Urine Sample  [] Pelvic Cultures  [] Blood Cultures   [] Wound Cultures          Cecilio Thomas RN  07/06/22 [id number]

## 2022-07-06 NOTE — ED PROVIDER NOTES
[facility]     Emergency Department     Faculty Attestation    I performed a history and physical examination of the patient and discussed management with the resident. I reviewed the residents note and agree with the documented findings and plan of care. Any areas of disagreement are noted on the chart. I was personally present for the key portions of any procedures. I have documented in the chart those procedures where I was not present during the key portions. I have reviewed the emergency nurses triage note. I agree with the chief complaint, past medical history, past surgical history, allergies, medications, social and family history as documented unless otherwise noted below. Documentation of the HPI, Physical Exam and Medical Decision Making performed by medical students or scribes is based on my personal performance of the HPI, PE and MDM. For Physician Assistant/ Nurse Practitioner cases/documentation I have personally evaluated this patient and have completed at least one if not all key elements of the E/M (history, physical exam, and MDM). Additional findings are as noted. Vital signs:   Vitals:    07/06/22 1417   BP:    Pulse: 66   Resp: 19   Temp:    SpO2: 80      28-year-old female here with left sided chest pain that radiates to the left arm. No shortness of breath, nausea, vomiting, palpitations, or diaphoresis. She is a difficult historian, and has multiple other complaints such as electric shocks radiating from her head to her feet. On exam, she is alert and afebrile. Breath sounds clear. Cardiac exam with a normal rate, regular rhythm. Abdomen soft and nontender to palpation. Extremities with no edema or calf tenderness.     EKG Interpretation    Interpreted by emergency department physician    Rhythm: normal sinus   Rate: normal  Axis: normal  Ectopy: none  Conduction: normal  ST Segments: normal  T Waves: non specific changes  Q Waves: none    Clinical Impression: non-specific EKG    MD Jeannie Schmitt M.D,  Attending Emergency  Physician            Jonny Cameron MD  07/06/22 2457

## 2022-07-06 NOTE — ED NOTES
Pt presents to the ED with C/O having chest pain in left an right chest.  Pt stated that she is experiencing numbness and tingling down the left arm. Pt stated that she is not eating and drinking for a couple days. Pt stated that pain comes and goes.         Kellen Basurto RN  07/06/22 [address], RN  07/06/22 0222

## 2022-07-06 NOTE — ED PROVIDER NOTES
[facility]  Emergency Department  Faculty Sign-Out Addendum     Care of Ida Cunningham was assumed from previous attending and is being seen for Chest Pain (began couple weeks ago ) and Tingling (\"passed out\" 1 month ago, +LOC, never got seen afterwards )  . The patient's initial evaluation and plan have been discussed with the prior provider who initially evaluated the patient. Handoff taken on the following patient from prior Attending Physician:    Santana Guerra    I was available and discussed any additional care issues that arose and coordinated the management plans with the resident(s) caring for the patient during my duty period. Any areas of disagreement with residents documentation of care or procedures are noted on the chart. I was personally present for the key portions of any/all procedures during my duty period. I have documented in the chart those procedures where I was not present during the key portions. EMERGENCY DEPARTMENT COURSE / MEDICAL DECISION MAKING:       MEDICATIONS GIVEN:  Orders Placed This Encounter   Medications    ibuprofen (ADVIL;MOTRIN) tablet 400 mg       LABS / RADIOLOGY:     Labs Reviewed   CBC WITH AUTO DIFFERENTIAL - Abnormal; Notable for the following components:       Result Value    Lymphocytes 52 (*)     All other components within normal limits   BASIC METABOLIC PANEL - Abnormal; Notable for the following components:    Glucose 69 (*)     All other components within normal limits   TROPONIN   TROPONIN       XR CHEST (2 VW)    Result Date: 7/6/2022  EXAMINATION: TWO XRAY VIEWS OF THE CHEST 7/6/2022 2:24 pm COMPARISON: 03/01/2022 HISTORY: ORDERING SYSTEM PROVIDED HISTORY: Chest pain TECHNOLOGIST PROVIDED HISTORY: Chest pain FINDINGS: The cardiomediastinal silhouette is normal in size and contour. The lungs are hyperinflated otherwise clear. No pleural effusion or pneumothorax is present.      No acute cardiopulmonary process       RECENT VITALS:     Temp: 97.2 °F (36.2 °C),  Heart Rate: 66, Resp: 19, BP: 133/89, SpO2: 97 %    This patient is a 77 y.o. Female with chest pain. Left-sided. Atypical features. Half pack per day smoker otherwise no CAD risk factors. Cardiac work-up, reassess    OUTSTANDING TASKS / RECOMMENDATIONS:    1.  Follow-up on cardiac work-up      Lisa Sosa MD, Brandan Bianchi  Attending Emergency Physician  [facility] ED        Claritza Page MD  07/06/22 7736

## 2022-07-11 LAB
EKG ATRIAL RATE: 73 BPM
EKG P AXIS: 80 DEGREES
EKG P-R INTERVAL: 162 MS
EKG Q-T INTERVAL: 400 MS
EKG QRS DURATION: 76 MS
EKG QTC CALCULATION (BAZETT): 440 MS
EKG R AXIS: 35 DEGREES
EKG T AXIS: 52 DEGREES
EKG VENTRICULAR RATE: 73 BPM

## 2023-03-21 ENCOUNTER — APPOINTMENT (OUTPATIENT)
Dept: CT IMAGING | Age: 68
End: 2023-03-21
Payer: MEDICARE

## 2023-03-21 ENCOUNTER — HOSPITAL ENCOUNTER (EMERGENCY)
Age: 68
Discharge: HOME | End: 2023-03-21
Attending: EMERGENCY MEDICINE
Payer: MEDICARE

## 2023-03-21 VITALS
RESPIRATION RATE: 22 BRPM | DIASTOLIC BLOOD PRESSURE: 81 MMHG | OXYGEN SATURATION: 96 % | HEART RATE: 73 BPM | TEMPERATURE: 98 F | SYSTOLIC BLOOD PRESSURE: 116 MMHG

## 2023-03-21 DIAGNOSIS — R10.31 RIGHT GROIN PAIN: Primary | ICD-10-CM

## 2023-03-21 LAB
ABSOLUTE EOS #: 0 K/UL (ref 0–0.4)
ABSOLUTE LYMPH #: 1.7 K/UL (ref 1–4.8)
ABSOLUTE MONO #: 0.5 K/UL (ref 0.1–1.2)
ANION GAP SERPL CALCULATED.3IONS-SCNC: 9 MMOL/L (ref 9–17)
BACTERIA: ABNORMAL
BASOPHILS # BLD: 1 % (ref 0–2)
BASOPHILS ABSOLUTE: 0 K/UL (ref 0–0.2)
BILIRUBIN URINE: NEGATIVE
BUN SERPL-MCNC: 16 MG/DL (ref 8–23)
CALCIUM SERPL-MCNC: 9.2 MG/DL (ref 8.6–10.4)
CHLORIDE SERPL-SCNC: 109 MMOL/L (ref 98–107)
CO2 SERPL-SCNC: 25 MMOL/L (ref 20–31)
COLOR: YELLOW
CREAT SERPL-MCNC: 0.83 MG/DL (ref 0.5–0.9)
EOSINOPHILS RELATIVE PERCENT: 1 % (ref 1–4)
EPITHELIAL CELLS UA: ABNORMAL /HPF (ref 0–5)
GFR SERPL CREATININE-BSD FRML MDRD: >60 ML/MIN/1.73M2
GLUCOSE SERPL-MCNC: 74 MG/DL (ref 70–99)
GLUCOSE UR STRIP.AUTO-MCNC: NEGATIVE MG/DL
HCT VFR BLD AUTO: 37 % (ref 36–46)
HGB BLD-MCNC: 12.1 G/DL (ref 12–16)
KETONES UR STRIP.AUTO-MCNC: NEGATIVE MG/DL
LEUKOCYTE ESTERASE UR QL STRIP.AUTO: NEGATIVE
LYMPHOCYTES # BLD: 29 % (ref 24–44)
MCH RBC QN AUTO: 28.9 PG (ref 26–34)
MCHC RBC AUTO-ENTMCNC: 32.6 G/DL (ref 31–37)
MCV RBC AUTO: 88.6 FL (ref 80–100)
MONOCYTES # BLD: 9 % (ref 2–11)
NITRITE UR QL STRIP.AUTO: NEGATIVE
PDW BLD-RTO: 13.6 % (ref 12.5–15.4)
PLATELET # BLD AUTO: 190 K/UL (ref 140–450)
PMV BLD AUTO: 8.5 FL (ref 6–12)
POTASSIUM SERPL-SCNC: 4.4 MMOL/L (ref 3.7–5.3)
PROT UR STRIP.AUTO-MCNC: 8.5 MG/DL (ref 5–8)
PROT UR STRIP.AUTO-MCNC: ABNORMAL MG/DL
RBC # BLD: 4.17 M/UL (ref 4–5.2)
RBC CLUMPS #/AREA URNS AUTO: ABNORMAL /HPF (ref 0–2)
SEG NEUTROPHILS: 60 % (ref 36–66)
SEGMENTED NEUTROPHILS ABSOLUTE COUNT: 3.5 K/UL (ref 1.8–7.7)
SODIUM SERPL-SCNC: 143 MMOL/L (ref 135–144)
SPECIFIC GRAVITY UA: 1.01 (ref 1–1.03)
TURBIDITY: CLEAR
URINE HGB: NEGATIVE
UROBILINOGEN, URINE: ABNORMAL
WBC # BLD AUTO: 5.9 K/UL (ref 3.5–11)
WBC UA: ABNORMAL /HPF (ref 0–5)

## 2023-03-21 PROCEDURE — 99284 EMERGENCY DEPT VISIT MOD MDM: CPT

## 2023-03-21 PROCEDURE — 6360000002 HC RX W HCPCS: Performed by: EMERGENCY MEDICINE

## 2023-03-21 PROCEDURE — 96374 THER/PROPH/DIAG INJ IV PUSH: CPT

## 2023-03-21 PROCEDURE — 2580000003 HC RX 258: Performed by: EMERGENCY MEDICINE

## 2023-03-21 PROCEDURE — 36415 COLL VENOUS BLD VENIPUNCTURE: CPT

## 2023-03-21 PROCEDURE — 80048 BASIC METABOLIC PNL TOTAL CA: CPT

## 2023-03-21 PROCEDURE — 81001 URINALYSIS AUTO W/SCOPE: CPT

## 2023-03-21 PROCEDURE — 74176 CT ABD & PELVIS W/O CONTRAST: CPT

## 2023-03-21 PROCEDURE — 85025 COMPLETE CBC W/AUTO DIFF WBC: CPT

## 2023-03-21 RX ORDER — IBUPROFEN 600 MG/1
600 TABLET ORAL EVERY 8 HOURS PRN
Qty: 21 TABLET | Refills: 0 | Status: SHIPPED | OUTPATIENT
Start: 2023-03-21

## 2023-03-21 RX ORDER — 0.9 % SODIUM CHLORIDE 0.9 %
1000 INTRAVENOUS SOLUTION INTRAVENOUS ONCE
Status: COMPLETED | OUTPATIENT
Start: 2023-03-21 | End: 2023-03-21

## 2023-03-21 RX ORDER — FENTANYL CITRATE 50 UG/ML
50 INJECTION, SOLUTION INTRAMUSCULAR; INTRAVENOUS ONCE
Status: COMPLETED | OUTPATIENT
Start: 2023-03-21 | End: 2023-03-21

## 2023-03-21 RX ADMIN — FENTANYL CITRATE 50 MCG: 50 INJECTION, SOLUTION INTRAMUSCULAR; INTRAVENOUS at 12:50

## 2023-03-21 RX ADMIN — SODIUM CHLORIDE 1000 ML: 9 INJECTION, SOLUTION INTRAVENOUS at 12:53

## 2023-03-21 ASSESSMENT — ENCOUNTER SYMPTOMS
COUGH: 0
BACK PAIN: 0
NAUSEA: 0
CONSTIPATION: 0
DIARRHEA: 0
SHORTNESS OF BREATH: 0
ABDOMINAL PAIN: 1
PHOTOPHOBIA: 0
SORE THROAT: 0
VOMITING: 0
RHINORRHEA: 0

## 2023-03-21 ASSESSMENT — PAIN SCALES - GENERAL
PAINLEVEL_OUTOF10: 8
PAINLEVEL_OUTOF10: 4
PAINLEVEL_OUTOF10: 6
PAINLEVEL_OUTOF10: 2

## 2023-03-21 ASSESSMENT — PAIN - FUNCTIONAL ASSESSMENT: PAIN_FUNCTIONAL_ASSESSMENT: 0-10

## 2023-03-21 ASSESSMENT — PAIN DESCRIPTION - LOCATION
LOCATION: HIP
LOCATION: HIP

## 2023-03-21 ASSESSMENT — PAIN DESCRIPTION - ORIENTATION
ORIENTATION: RIGHT
ORIENTATION: RIGHT

## 2023-03-21 ASSESSMENT — PAIN DESCRIPTION - DESCRIPTORS: DESCRIPTORS: SQUEEZING

## 2023-03-21 NOTE — ED PROVIDER NOTES
and H. pylori infection. SURGICAL HISTORY      has no past surgical history on file. CURRENTMEDICATIONS       Previous Medications    ACETAMINOPHEN (TYLENOL) 500 MG TABLET    Take 1 tablet by mouth every 6 hours as needed for Pain    ALBUTEROL SULFATE HFA (VENTOLIN HFA) 108 (90 BASE) MCG/ACT INHALER    Inhale 2 puffs into the lungs 4 times daily as needed for Wheezing    DICYCLOMINE (BENTYL) 10 MG CAPSULE    Take 1 capsule by mouth every 6 hours as needed (abdominal cramps)    ELASTIC BANDAGES & SUPPORTS (LUMBAR BACK BRACE/SUPPORT PAD) MISC    1 Units by Does not apply route as needed (PNEUMATIC OFFLOADING BACK BRACE)    FAMOTIDINE (PEPCID) 40 MG TABLET    Take 1 tablet by mouth daily    GABAPENTIN (NEURONTIN) 300 MG CAPSULE    Take 1 capsule by mouth 2 times daily for 30 days. IBUPROFEN (ADVIL;MOTRIN) 600 MG TABLET    Take 1 tablet by mouth every 6 hours as needed for Pain    TIZANIDINE (ZANAFLEX) 2 MG TABLET    Take 1 tablet by mouth 3 times daily as needed (Muscle spasm)       ALLERGIES     is allergic to gadolinium, iodides, ketorolac, metronidazole, propoxyphene, tetanus-diphtheria toxoids td, tuberculin tests, benadryl [diphenhydramine], decadron [dexamethasone], gadolinium derivatives, iodinated contrast media, iodine, penicillins, tetanus toxoids, and other. FAMILY HISTORY     has no family status information on file. family history is not on file. SOCIAL HISTORY      reports that she has been smoking cigarettes. She has been smoking an average of .5 packs per day. She has never used smokeless tobacco. She reports that she does not drink alcohol and does not use drugs. PHYSICAL EXAM    (up to 7 for level 4, 8 or more for level 5)   INITIAL VITALS:  oral temperature is 98 °F (36.7 °C). Her blood pressure is 116/81 and her pulse is 73. Her respiration is 22 and oxygen saturation is 96%. Physical Exam  Vitals and nursing note reviewed.    Constitutional:       Appearance: Normal Diverticulosis with no evidence of diverticulitis. PELVIS: The bladder and pelvic organs are unremarkable. PERITONEUM/RETROPERITONEUM: No free air or free fluid is noted. No pathologically enlarged lymphadenopathy. The vasculature do not demonstrate acute abnormality. BONES/SOFT TISSUES: The osseous structures demonstrate no acute abnormality. At L4-L5, moderate canal stenosis and moderate bilateral neural foraminal narrowing. At L5-S1, moderate bilateral neural foraminal narrowing. No acute pathology within the abdomen and pelvis. No obstructive uropathy. Appendix is normal.  No obstructive uropathy. No inguinal hernia. Diverticulosis with no evidence of diverticulitis.         LABS:  Results for orders placed or performed during the hospital encounter of 03/21/23   CBC with Auto Differential   Result Value Ref Range    WBC 5.9 3.5 - 11.0 k/uL    RBC 4.17 4.0 - 5.2 m/uL    Hemoglobin 12.1 12.0 - 16.0 g/dL    Hematocrit 37.0 36 - 46 %    MCV 88.6 80 - 100 fL    MCH 28.9 26 - 34 pg    MCHC 32.6 31 - 37 g/dL    RDW 13.6 12.5 - 15.4 %    Platelets 417 120 - 525 k/uL    MPV 8.5 6.0 - 12.0 fL    Seg Neutrophils 60 36 - 66 %    Lymphocytes 29 24 - 44 %    Monocytes 9 2 - 11 %    Eosinophils % 1 1 - 4 %    Basophils 1 0 - 2 %    Segs Absolute 3.50 1.8 - 7.7 k/uL    Absolute Lymph # 1.70 1.0 - 4.8 k/uL    Absolute Mono # 0.50 0.1 - 1.2 k/uL    Absolute Eos # 0.00 0.0 - 0.4 k/uL    Basophils Absolute 0.00 0.0 - 0.2 k/uL   Basic Metabolic Panel   Result Value Ref Range    Glucose 74 70 - 99 mg/dL    BUN 16 8 - 23 mg/dL    Creatinine 0.83 0.50 - 0.90 mg/dL    Est, Glom Filt Rate >60 >60 mL/min/1.73m2    Calcium 9.2 8.6 - 10.4 mg/dL    Sodium 143 135 - 144 mmol/L    Potassium 4.4 3.7 - 5.3 mmol/L    Chloride 109 (H) 98 - 107 mmol/L    CO2 25 20 - 31 mmol/L    Anion Gap 9 9 - 17 mmol/L   Urinalysis with Microscopic   Result Value Ref Range    Color, UA Yellow Yellow    Turbidity UA Clear Clear    Glucose, Ur NEGATIVE

## 2023-03-21 NOTE — DISCHARGE INSTRUCTIONS
Please understand that at this time there is no evidence for a more serious underlying process, but that early in the process of an illness or injury, an emergency department workup can be falsely reassuring. You should contact your family doctor within the next 48 hours for a follow up appointment    Roland Rdz!!!    From [facility] ([facility]) and [facility]    On behalf of the Emergency Department staff at [facility]), I would like to thank you for giving us the opportunity to address your health care needs and concerns. We hope that during your visit, our service was delivered in a professional and caring manner. Please keep [facility] ([facility]) in mind as we walk with you down the path to your own personal wellness. Please expect an automated text message or email from us so we can ask a few questions about your health and progress. Based on your answers, a clinician may call you back to offer help and instructions. Please understand that early in the process of an illness or injury, an emergency department workup can be falsely reassuring. If you notice any worsening, changing or persistent symptoms please call your family doctor or return to the ER immediately. Tell us how we did during your visit at [email or web address]   and let us know about your experience

## 2023-04-27 ENCOUNTER — HOSPITAL ENCOUNTER (EMERGENCY)
Age: 68
Discharge: HOME | End: 2023-04-27
Attending: EMERGENCY MEDICINE
Payer: MEDICARE

## 2023-04-27 ENCOUNTER — APPOINTMENT (OUTPATIENT)
Dept: GENERAL RADIOLOGY | Age: 68
End: 2023-04-27
Payer: MEDICARE

## 2023-04-27 VITALS
HEART RATE: 61 BPM | BODY MASS INDEX: 19.29 KG/M2 | TEMPERATURE: 98.2 F | OXYGEN SATURATION: 98 % | SYSTOLIC BLOOD PRESSURE: 130 MMHG | WEIGHT: 120 LBS | DIASTOLIC BLOOD PRESSURE: 82 MMHG | RESPIRATION RATE: 16 BRPM | HEIGHT: 66 IN

## 2023-04-27 DIAGNOSIS — F12.90 CANNABIS USE DISORDER: ICD-10-CM

## 2023-04-27 DIAGNOSIS — N39.0 URINARY TRACT INFECTION WITHOUT HEMATURIA, SITE UNSPECIFIED: Primary | ICD-10-CM

## 2023-04-27 DIAGNOSIS — F14.10 NONDEPENDENT COCAINE ABUSE (HCC): ICD-10-CM

## 2023-04-27 LAB
ABSOLUTE EOS #: 0.1 K/UL (ref 0–0.4)
ABSOLUTE LYMPH #: 3 K/UL (ref 1–4.8)
ABSOLUTE MONO #: 0.6 K/UL (ref 0.1–1.2)
ALBUMIN SERPL-MCNC: 4.6 G/DL (ref 3.5–5.2)
ALBUMIN/GLOBULIN RATIO: 1.6 (ref 1–2.5)
ALP SERPL-CCNC: 54 U/L (ref 35–104)
ALT SERPL-CCNC: 9 U/L (ref 5–33)
AMORPHOUS: ABNORMAL
AMPHETAMINE SCREEN URINE: NEGATIVE
ANION GAP SERPL CALCULATED.3IONS-SCNC: 13 MMOL/L (ref 9–17)
AST SERPL-CCNC: 17 U/L
BACTERIA: ABNORMAL
BARBITURATE SCREEN URINE: NEGATIVE
BASOPHILS # BLD: 1 % (ref 0–2)
BASOPHILS ABSOLUTE: 0.1 K/UL (ref 0–0.2)
BENZODIAZEPINE SCREEN, URINE: NEGATIVE
BILIRUB DIRECT SERPL-MCNC: 0.1 MG/DL
BILIRUB INDIRECT SERPL-MCNC: 0.2 MG/DL (ref 0–1)
BILIRUB SERPL-MCNC: 0.3 MG/DL (ref 0.3–1.2)
BILIRUBIN URINE: NEGATIVE
BUN SERPL-MCNC: 15 MG/DL (ref 8–23)
CALCIUM SERPL-MCNC: 9.8 MG/DL (ref 8.6–10.4)
CANNABINOID SCREEN URINE: POSITIVE
CHLORIDE SERPL-SCNC: 103 MMOL/L (ref 98–107)
CK SERPL-CCNC: 131 U/L (ref 26–192)
CO2 SERPL-SCNC: 24 MMOL/L (ref 20–31)
COCAINE METABOLITE, URINE: POSITIVE
COLOR: YELLOW
CREAT SERPL-MCNC: 0.7 MG/DL (ref 0.5–0.9)
D DIMER BLD IA.RAPID-MCNC: 0.21 UG/ML FEU
EOSINOPHILS RELATIVE PERCENT: 1 % (ref 1–4)
EPITHELIAL CELLS UA: ABNORMAL /HPF (ref 0–5)
ERYTHROCYTE [SEDIMENTATION RATE] IN BLOOD BY WESTERGREN METHOD: 5 MM/HR (ref 0–30)
FENTANYL URINE: NEGATIVE
GFR SERPL CREATININE-BSD FRML MDRD: >60 ML/MIN/1.73M2
GLUCOSE SERPL-MCNC: 100 MG/DL (ref 70–99)
GLUCOSE UR STRIP.AUTO-MCNC: NEGATIVE MG/DL
HCT VFR BLD AUTO: 36.3 % (ref 36–46)
HGB BLD-MCNC: 12.1 G/DL (ref 12–16)
KETONES UR STRIP.AUTO-MCNC: NEGATIVE MG/DL
LEUKOCYTE ESTERASE UR QL STRIP.AUTO: ABNORMAL
LIPASE SERPL-CCNC: 34 U/L (ref 13–60)
LYMPHOCYTES # BLD: 47 % (ref 24–44)
MCH RBC QN AUTO: 29.3 PG (ref 26–34)
MCHC RBC AUTO-ENTMCNC: 33.3 G/DL (ref 31–37)
MCV RBC AUTO: 88.1 FL (ref 80–100)
METHADONE SCREEN, URINE: NEGATIVE
MONOCYTES # BLD: 9 % (ref 2–11)
MUCUS: ABNORMAL
NITRITE UR QL STRIP.AUTO: NEGATIVE
OPIATES, URINE: NEGATIVE
OXYCODONE SCREEN URINE: NEGATIVE
PDW BLD-RTO: 13.5 % (ref 12.5–15.4)
PHENCYCLIDINE, URINE: NEGATIVE
PLATELET # BLD AUTO: 198 K/UL (ref 140–450)
PMV BLD AUTO: 8.3 FL (ref 6–12)
POTASSIUM SERPL-SCNC: 3.7 MMOL/L (ref 3.7–5.3)
PROT SERPL-MCNC: 7.5 G/DL (ref 6.4–8.3)
PROT UR STRIP.AUTO-MCNC: 6 MG/DL (ref 5–8)
PROT UR STRIP.AUTO-MCNC: ABNORMAL MG/DL
RBC # BLD: 4.13 M/UL (ref 4–5.2)
RBC CLUMPS #/AREA URNS AUTO: ABNORMAL /HPF (ref 0–2)
SEG NEUTROPHILS: 42 % (ref 36–66)
SEGMENTED NEUTROPHILS ABSOLUTE COUNT: 2.7 K/UL (ref 1.8–7.7)
SODIUM SERPL-SCNC: 140 MMOL/L (ref 135–144)
SPECIFIC GRAVITY UA: 1.03 (ref 1–1.03)
TEST INFORMATION: ABNORMAL
TURBIDITY: ABNORMAL
URINE HGB: ABNORMAL
UROBILINOGEN, URINE: NORMAL
WBC # BLD AUTO: 6.3 K/UL (ref 3.5–11)
WBC UA: ABNORMAL /HPF (ref 0–5)

## 2023-04-27 PROCEDURE — 80307 DRUG TEST PRSMV CHEM ANLYZR: CPT

## 2023-04-27 PROCEDURE — 82550 ASSAY OF CK (CPK): CPT

## 2023-04-27 PROCEDURE — 80076 HEPATIC FUNCTION PANEL: CPT

## 2023-04-27 PROCEDURE — 85025 COMPLETE CBC W/AUTO DIFF WBC: CPT

## 2023-04-27 PROCEDURE — 36415 COLL VENOUS BLD VENIPUNCTURE: CPT

## 2023-04-27 PROCEDURE — 81001 URINALYSIS AUTO W/SCOPE: CPT

## 2023-04-27 PROCEDURE — 85652 RBC SED RATE AUTOMATED: CPT

## 2023-04-27 PROCEDURE — 99285 EMERGENCY DEPT VISIT HI MDM: CPT

## 2023-04-27 PROCEDURE — 83690 ASSAY OF LIPASE: CPT

## 2023-04-27 PROCEDURE — 71046 X-RAY EXAM CHEST 2 VIEWS: CPT

## 2023-04-27 PROCEDURE — 80048 BASIC METABOLIC PNL TOTAL CA: CPT

## 2023-04-27 PROCEDURE — 93005 ELECTROCARDIOGRAM TRACING: CPT | Performed by: EMERGENCY MEDICINE

## 2023-04-27 PROCEDURE — 85379 FIBRIN DEGRADATION QUANT: CPT

## 2023-04-27 RX ORDER — DICYCLOMINE HYDROCHLORIDE 10 MG/1
10 CAPSULE ORAL EVERY 6 HOURS PRN
Qty: 30 CAPSULE | Refills: 0 | Status: SHIPPED | OUTPATIENT
Start: 2023-04-27

## 2023-04-27 RX ORDER — FAMOTIDINE 40 MG/1
20 TABLET, FILM COATED ORAL 2 TIMES DAILY
Qty: 60 TABLET | Refills: 0 | Status: SHIPPED | OUTPATIENT
Start: 2023-04-27

## 2023-04-27 RX ORDER — NITROFURANTOIN 25; 75 MG/1; MG/1
100 CAPSULE ORAL 2 TIMES DAILY
Qty: 14 CAPSULE | Refills: 0 | Status: SHIPPED | OUTPATIENT
Start: 2023-04-27

## 2023-04-27 ASSESSMENT — PAIN DESCRIPTION - PAIN TYPE: TYPE: CHRONIC PAIN

## 2023-04-27 ASSESSMENT — PAIN SCALES - GENERAL: PAINLEVEL_OUTOF10: 10

## 2023-04-27 ASSESSMENT — PAIN - FUNCTIONAL ASSESSMENT
PAIN_FUNCTIONAL_ASSESSMENT: 0-10
PAIN_FUNCTIONAL_ASSESSMENT: PREVENTS OR INTERFERES SOME ACTIVE ACTIVITIES AND ADLS

## 2023-04-27 ASSESSMENT — PAIN DESCRIPTION - ONSET: ONSET: ON-GOING

## 2023-04-27 ASSESSMENT — PAIN DESCRIPTION - FREQUENCY: FREQUENCY: CONTINUOUS

## 2023-04-27 ASSESSMENT — PAIN DESCRIPTION - LOCATION: LOCATION: GENERALIZED

## 2023-04-27 ASSESSMENT — PAIN DESCRIPTION - DESCRIPTORS: DESCRIPTORS: ACHING;PINS AND NEEDLES

## 2023-04-27 NOTE — DISCHARGE INSTRUCTIONS
Drink extra fluids.  Stop using marijuana and cocaine.  Follow-up with primary care physician as soon as possible.  Return for worsening symptoms.

## 2023-04-27 NOTE — ED PROVIDER NOTES
[address]      Pt Name: Velma Frank  MRN: [id number]  [place] 1955  Date of evaluation: 4/27/2023  Provider: Nickolas Aldana MD    CHIEF COMPLAINT     Chief Complaint   Patient presents with    Shortness of Breath     Patient states that she feels short of breath. Patient advises that she hurts all over, feels like she is being electrocuted, and is not sleeping. Patient states that she is constantly cold. HISTORY OF PRESENT ILLNESS   (Location/Symptom, Timing/Onset, Context/Setting,Quality, Duration, Modifying Factors, Severity)  Note limiting factors. Velma Frank is a 79 y.o. female who presents to the emergency department with multiple complaints. She states that she has a sensation of being electrocuted and has tingling all over her body for the last 2 months. Patient states MRI neck was ordered but could not be performed because she missed her appointment because she had forgotten the date. She states that she feels generalized tingling all over for the last 2 months. Patient also states that she has pain in the right rib cage for the last 1 week. Pain is worse with any kind of movement or deep breathing and she is concerned about possible pneumonia. She also goes on to state that she hurts in every bone in her body and this has been going on for 2 weeks. Patient moved to this area from [place] 3 years ago and states that because of COVID she was unable to find any accepting primary care physician. The history is provided by the patient and medical records. Nursing Notes werereviewed. REVIEW OF SYSTEMS    (2-9 systems for level 4, 10 or more for level 5)     Review of Systems   Constitutional:  Positive for fatigue. Negative for fever. Respiratory:  Positive for shortness of breath. Cardiovascular:  Positive for chest pain. Gastrointestinal:  Positive for abdominal pain and vomiting.

## 2023-04-29 ASSESSMENT — ENCOUNTER SYMPTOMS
SHORTNESS OF BREATH: 1
VOMITING: 1
ABDOMINAL PAIN: 1

## 2023-04-30 LAB
EKG ATRIAL RATE: 65 BPM
EKG P AXIS: 74 DEGREES
EKG P-R INTERVAL: 176 MS
EKG Q-T INTERVAL: 428 MS
EKG QRS DURATION: 128 MS
EKG QTC CALCULATION (BAZETT): 445 MS
EKG R AXIS: 49 DEGREES
EKG T AXIS: 12 DEGREES
EKG VENTRICULAR RATE: 65 BPM

## 2023-10-17 NOTE — FLOWSHEET NOTE
[x] [facility]  [facility]  [address]  [phone]  F: [phone] [] [address]  [facility]   [address]  P: [phone]  F: [phone] [] [address] [facility]  Therapy  [address]  P: [phone]  F: [phone] [] [address]  P: [phone]  F: [phone] [] [address]  [facility]   [address]   [place]: [phone]  F: [phone]      Physical Therapy Daily Treatment Note    Date:  10/8/2021  Patient Name:  Andrew Colon    :  1955  MRN: [id number]  Physician: Norm Emanuel NP                                    [facility], Medicaid (12 visits, from 21 to 21)  Medical Diagnosis: Spinal stenosis of lumbar region (M48.061)                  Rehab Codes: M54.5, M79.604, M79.605, M25.60, M62.81  Onset Date: 21                 Next 's appt. :    Visit# / total visits: ; Progress note for Medicare patient due at visit        Cancels/No Shows:     Subjective:    Pain:  [x] Yes  [] No Location: low back Pain Rating: (0-10 scale) 10/10  Pain altered Tx:  [] No  [x] Yes  Action: hot pack/estim  Comments: Patient reports most pain in the R arm with shooting electrical pain up the arm into the R shoulder. Low back pain,  Currently not radiating down the legs. Hot packs and patches, estim, helped with pain.            Objective:  Modalities: First, keep on while doing stretches  Treatment Location  Left      Right                          Position    [x]          [x]  [x] Supine    [] Prone   [] Side lying  [] Sitting          Treatment Modality    Hot Pack:    [x] Large   [] Medium    [] Cervical     25 min    Electrical Stim:    [x] IFC     [] MFAC      [] HVPC Protocol:_______  _____X 20 min                          #Channels:  [x] 1        [x] 2       [] Other:    Other:         Precautions:  Exercises:  Exercise Reps/ Time Weight/ Level Comments         Supine      LTR 10x10\"     Olesya Teri 10\"    PPT 10x  Draw-ins only 10/5/21   PPT with march 10x     DKTC 10x10\"     Isometric add contraction 10x  Pillow between thighs   glute sets Too painful     SLR 5x B     Bridge 10x  Added 10.8   Hip add 10x  Added 10.8               Other:      Treatment Charges: Mins Units   [x]  Modalities HP/estim 20 1   [x]  Ther Exercise 28 2   []  Manual Therapy     []  Ther Activities     []  Aquatics     []  Vasocompression     []  Other     Total Treatment time 48 3       Assessment: [x] Progressing toward goals. 'Fair tolerance of treatment today. Additional time required for slow cautious movements due to pain. Hip add and bridges added to increase strength. Quad lagging noted with SLR due to weak quads. Ended with HP/estim to decrease pain. [] No change. [x] Other:  Patient's tolerance improves with verbal distraction. [x] Patient would continue to benefit from skilled physical therapy services in order to:  address severely limited lumbar ROM, weakness of the core and LE's, abnormal posture (reduced lumbar lordosis), and functional impairments including difficulty with all ADL's. STG: (to be met in 8 treatments)  1. ? Pain: Decrease low back and LE pain to 7/10   2. ? ROM: Increase lumbar flexion to 50% limited, extension and rotation bilaterally to 25% limited  3. ? Strength: Increase core strength as demonstrated by progression of DLS exercises  4. ? Function: Improve Oswestry to 65% impairment  5. Patient to be independent with home exercise program as demonstrated by performance with correct form without cues. 6. Demonstrate Knowledge of fall prevention  LTG: (to be met in 12 treatments)  1. Increase lumbar ROM to wfl with minimal pain  2.  Pt will report improvement in sleep to being reduced by less than 1/4  3. Improve Oswestry to 50% impairment     Patient goals: Hoping for relief of pain    Pt. Education:  [x] Yes  [] No  [x] Reviewed Prior HEP/Ed  Method of Education: [x] Verbal  [x] Demo  [] Written  Comprehension of Education:  [x] Verbalizes understanding. [x] Demonstrates understanding. [x] Needs review. [x] Demonstrates/verbalizes HEP/Ed previously given. Plan: [x] Continue current frequency toward long and short term goals.     [x] Specific Instructions for subsequent treatments: Continue tx per POC      Time In: 11:00am             Time Out: 11:53 am    Electronically signed by:  Sandy Drake PTA Photo Preface (Leave Blank If You Do Not Want): Photographs were obtained today Detail Level: Detailed

## 2023-11-16 NOTE — CARE COORDINATION
Case Management Initial Discharge Plan  Marixa De Los Santos,             Met with:patient to discuss discharge plans. Information verified: address, contacts, phone number, , insurance Yes    Emergency Contact/Next of Kin name & number: Uriel Alonso [phone]    PCP: Christoph Webb MD  Date of last visit: 2020    Insurance Provider: [facility]    Discharge Planning    Living Arrangements:  Other (Comment)   Support Systems:  [id number] has 1 stories  0 stairs to climb to get into front door, 0stairs to climb to reach second floor  Location of bedroom/bathroom in home main    Patient able to perform ADL's:Independent    Current Services (outpatient & in home) none  DME equipment: walker does not use  DME provider: none    Receiving oral anticoagulation therapy? No    If indicated:   Physician managing anticoagulation treatment: n/a  Where does patient obtain lab work for ATC treatment? n/a      Potential Assistance Needed:  N/A    Patient agreeable to home care: No  [place] of choice provided:  n/a    Prior SNF/Rehab Placement and Facility: none  Agreeable to SNF/Rehab: No  [place] of choice provided: n/a     Evaluation: yes    Expected Discharge date:       Patient expects to be discharged to:  home  Follow Up Appointment: Best Day/ Time:      Transportation provider: self  Transportation arrangements needed for discharge: No    Readmission Risk              Risk of Unplanned Readmission:        0             Does patient have a readmission risk score greater than 14?: not calculated  If yes, follow-up appointment must be made within 7 days of discharge.      Goals of Care: get balance back      Discharge Plan: Plans to return to shelter,pcp established,drove here          Electronically signed by Estrella Fernandez RN on 20 at 3:35 PM EDT Additional Notes: We will send Rx once TB results are received. Render Risk Assessment In Note?: no Detail Level: Simple

## 2024-04-18 ENCOUNTER — HOSPITAL ENCOUNTER (EMERGENCY)
Age: 69
Discharge: HOME | End: 2024-04-18
Attending: EMERGENCY MEDICINE
Payer: MEDICARE

## 2024-04-18 ENCOUNTER — APPOINTMENT (OUTPATIENT)
Dept: GENERAL RADIOLOGY | Age: 69
End: 2024-04-18
Payer: MEDICARE

## 2024-04-18 VITALS
RESPIRATION RATE: 20 BRPM | HEART RATE: 63 BPM | TEMPERATURE: 98.4 F | SYSTOLIC BLOOD PRESSURE: 121 MMHG | WEIGHT: 125.66 LBS | BODY MASS INDEX: 20.2 KG/M2 | HEIGHT: 66 IN | DIASTOLIC BLOOD PRESSURE: 77 MMHG | OXYGEN SATURATION: 97 %

## 2024-04-18 DIAGNOSIS — F41.9 ANXIETY: Primary | ICD-10-CM

## 2024-04-18 DIAGNOSIS — J45.901 EXACERBATION OF ASTHMA, UNSPECIFIED ASTHMA SEVERITY, UNSPECIFIED WHETHER PERSISTENT: ICD-10-CM

## 2024-04-18 LAB
ANION GAP SERPL CALCULATED.3IONS-SCNC: 10 MMOL/L (ref 9–17)
BASOPHILS # BLD: 0 K/UL (ref 0–0.2)
BASOPHILS NFR BLD: 1 % (ref 0–2)
BUN SERPL-MCNC: 13 MG/DL (ref 8–23)
CALCIUM SERPL-MCNC: 9.3 MG/DL (ref 8.6–10.4)
CHLORIDE SERPL-SCNC: 105 MMOL/L (ref 98–107)
CO2 SERPL-SCNC: 24 MMOL/L (ref 20–31)
CREAT SERPL-MCNC: 0.8 MG/DL (ref 0.5–0.9)
D DIMER PPP FEU-MCNC: 0.19 UG/ML FEU
EOSINOPHIL # BLD: 0 K/UL (ref 0–0.4)
EOSINOPHILS RELATIVE PERCENT: 1 % (ref 1–4)
ERYTHROCYTE [DISTWIDTH] IN BLOOD BY AUTOMATED COUNT: 14 % (ref 12.5–15.4)
GFR SERPL CREATININE-BSD FRML MDRD: 80 ML/MIN/1.73M2
GLUCOSE SERPL-MCNC: 81 MG/DL (ref 70–99)
HCT VFR BLD AUTO: 36.5 % (ref 36–46)
HGB BLD-MCNC: 12.2 G/DL (ref 12–16)
LYMPHOCYTES NFR BLD: 1.5 K/UL (ref 1–4.8)
LYMPHOCYTES RELATIVE PERCENT: 38 % (ref 24–44)
MCH RBC QN AUTO: 28.8 PG (ref 26–34)
MCHC RBC AUTO-ENTMCNC: 33.4 G/DL (ref 31–37)
MCV RBC AUTO: 86.4 FL (ref 80–100)
MONOCYTES NFR BLD: 0.3 K/UL (ref 0.1–1.2)
MONOCYTES NFR BLD: 8 % (ref 2–11)
NEUTROPHILS NFR BLD: 52 % (ref 36–66)
NEUTS SEG NFR BLD: 2 K/UL (ref 1.8–7.7)
PLATELET # BLD AUTO: 188 K/UL (ref 140–450)
PMV BLD AUTO: 8.4 FL (ref 6–12)
POTASSIUM SERPL-SCNC: 4 MMOL/L (ref 3.7–5.3)
RBC # BLD AUTO: 4.22 M/UL (ref 4–5.2)
SODIUM SERPL-SCNC: 139 MMOL/L (ref 135–144)
TROPONIN I SERPL HS-MCNC: 8 NG/L (ref 0–14)
WBC OTHER # BLD: 3.9 K/UL (ref 3.5–11)

## 2024-04-18 PROCEDURE — 93005 ELECTROCARDIOGRAM TRACING: CPT | Performed by: PHYSICIAN ASSISTANT

## 2024-04-18 PROCEDURE — 6360000002 HC RX W HCPCS: Performed by: PHYSICIAN ASSISTANT

## 2024-04-18 PROCEDURE — 85379 FIBRIN DEGRADATION QUANT: CPT

## 2024-04-18 PROCEDURE — 96374 THER/PROPH/DIAG INJ IV PUSH: CPT

## 2024-04-18 PROCEDURE — 6370000000 HC RX 637 (ALT 250 FOR IP): Performed by: PHYSICIAN ASSISTANT

## 2024-04-18 PROCEDURE — 71045 X-RAY EXAM CHEST 1 VIEW: CPT

## 2024-04-18 PROCEDURE — 36415 COLL VENOUS BLD VENIPUNCTURE: CPT

## 2024-04-18 PROCEDURE — 84484 ASSAY OF TROPONIN QUANT: CPT

## 2024-04-18 PROCEDURE — 99285 EMERGENCY DEPT VISIT HI MDM: CPT

## 2024-04-18 PROCEDURE — 85025 COMPLETE CBC W/AUTO DIFF WBC: CPT

## 2024-04-18 PROCEDURE — 80048 BASIC METABOLIC PNL TOTAL CA: CPT

## 2024-04-18 RX ORDER — FLUTICASONE FUROATE AND VILANTEROL 100; 25 UG/1; UG/1
1 POWDER RESPIRATORY (INHALATION) DAILY
Qty: 1 EACH | Refills: 0 | Status: SHIPPED | OUTPATIENT
Start: 2024-04-18

## 2024-04-18 RX ORDER — ALBUTEROL SULFATE 90 UG/1
2 AEROSOL, METERED RESPIRATORY (INHALATION) ONCE
Status: COMPLETED | OUTPATIENT
Start: 2024-04-18 | End: 2024-04-18

## 2024-04-18 RX ORDER — LORAZEPAM 2 MG/ML
0.5 INJECTION INTRAMUSCULAR ONCE
Status: COMPLETED | OUTPATIENT
Start: 2024-04-18 | End: 2024-04-18

## 2024-04-18 RX ORDER — FLUTICASONE FUROATE AND VILANTEROL 100; 25 UG/1; UG/1
1 POWDER RESPIRATORY (INHALATION) DAILY
COMMUNITY
End: 2024-04-18

## 2024-04-18 RX ORDER — ALBUTEROL SULFATE 90 UG/1
2 AEROSOL, METERED RESPIRATORY (INHALATION) EVERY 4 HOURS PRN
Qty: 1 EACH | Refills: 0 | Status: SHIPPED | OUTPATIENT
Start: 2024-04-18

## 2024-04-18 RX ADMIN — ALBUTEROL SULFATE 2 PUFF: 90 AEROSOL, METERED RESPIRATORY (INHALATION) at 14:29

## 2024-04-18 RX ADMIN — LORAZEPAM 0.5 MG: 2 INJECTION INTRAMUSCULAR; INTRAVENOUS at 15:00

## 2024-04-18 ASSESSMENT — PAIN DESCRIPTION - LOCATION: LOCATION: BACK;HEAD

## 2024-04-18 ASSESSMENT — PAIN - FUNCTIONAL ASSESSMENT
PAIN_FUNCTIONAL_ASSESSMENT: NONE - DENIES PAIN
PAIN_FUNCTIONAL_ASSESSMENT: 0-10

## 2024-04-18 ASSESSMENT — PAIN DESCRIPTION - DESCRIPTORS: DESCRIPTORS: ACHING

## 2024-04-18 ASSESSMENT — PAIN DESCRIPTION - PAIN TYPE: TYPE: CHRONIC PAIN

## 2024-04-18 ASSESSMENT — PAIN SCALES - GENERAL: PAINLEVEL_OUTOF10: 8

## 2024-04-18 NOTE — ED PROVIDER NOTES
[facility] Emergency Department    [address]  [facility] [address]  Phone: [phone]  Fax: [phone]  Emergency Department  Faculty Attestation    I performed a history and physical examination of the patient and discussed management with the mid level provider. I reviewed the mid level provider's note and agree with the documented findings and plan of care. Any areas of disagreement are noted on the chart. I was personally present for the key portions of any procedures. I have documented in the chart those procedures where I was not present during the key portions. I have reviewed the emergency nurses triage note. I agree with the chief complaint, past medical history, past surgical history, allergies, medications, social and family history as documented unless otherwise noted below. Documentation of the HPI, Physical Exam and Medical Decision Making performed by medical students or scribes is based on my personal performance of the HPI, PE and MDM. For Physician Assistant/ Nurse Practitioner cases/documentation I have personally evaluated this patient and have completed at least one if not all key elements of the E/M (history, physical exam, and MDM). Additional findings are as noted.      Primary Care Physician:  No primary care provider on file.    CHIEF COMPLAINT       Chief Complaint   Patient presents with    Shortness of Breath     Shortness of breath that started suddenly at work today.  Pt has multiple other complaints too: ongoing abdominal issues, pt describing a head injury from couple weeks ago still giving her pain, numbness and tingle to legs/feet, chronic back pain and curious rash on neck.        RECENT VITALS:   Temp: 98.4 °F (36.9 °C),  Pulse: 63, Respirations: 20, BP: 121/77    LABS:  Labs Reviewed   BASIC METABOLIC PANEL   CBC WITH AUTO DIFFERENTIAL   D-DIMER, QUANTITATIVE   TROPONIN        XR CHEST PORTABLE (Final result)  Result time 04/18/24

## 2024-04-18 NOTE — ED NOTES
Pt was at work at [employer] and states she felt like she couldn't breath . Pt went to her car to get her inhaler and realized it was empty . Pt states that made her more anxious . Pt then felt worse and called 911 .

## 2024-04-18 NOTE — ED PROVIDER NOTES
EMERGENCY DEPARTMENT ENCOUNTER      Pt Name: Valerie Cui  MRN: [id number]  Birthdate 1955  Date of evaluation: 4/18/2024  Provider: [place] PA-C    CHIEF COMPLAINT       Chief Complaint   Patient presents with    Shortness of Breath     Shortness of breath that started suddenly at work today.  Pt has multiple other complaints too: ongoing abdominal issues, pt describing a head injury from couple weeks ago still giving her pain, numbness and tingle to legs/feet, chronic back pain and curious rash on neck.          HISTORY OF PRESENT ILLNESS      Valerie Cui is a 68 y.o. female who presents to the emergency department via EMS complaining of shortness of breath.  Patient states she has a history of asthma and does have inhalers and was at work and presents for evaluation.  She states that nobody helped her at work and this made her anxious, patient also presents with anxiety symptoms.  She states she does not have a family doctor here.  She denies any chest pain or abdominal pain or nausea or vomiting or any fevers or chills or any recent illness        REVIEW OF SYSTEMS       Review of Systems   AS STATED IN HPI      PAST MEDICAL HISTORY     Past Medical History:   Diagnosis Date    Emphysema (subcutaneous) (surgical) resulting from a procedure     GERD (gastroesophageal reflux disease)     H. pylori infection          SURGICAL HISTORY       No past surgical history on file.      CURRENT MEDICATIONS       Discharge Medication List as of 4/18/2024  3:53 PM        CONTINUE these medications which have NOT CHANGED    Details   dicyclomine (BENTYL) 10 MG capsule Take 1 capsule by mouth every 6 hours as needed (abdominal cramps), Disp-30 capsule, R-0Normal      famotidine (PEPCID) 40 MG tablet Take 0.5 tablets by mouth 2 times daily, Disp-60 tablet, R-0Normal      nitrofurantoin, macrocrystal-monohydrate, (MACROBID) 100 MG capsule Take 1 capsule by mouth 2 times daily, Disp-14 capsule,

## 2024-04-19 LAB
EKG ATRIAL RATE: 70 BPM
EKG P AXIS: 70 DEGREES
EKG P-R INTERVAL: 174 MS
EKG Q-T INTERVAL: 442 MS
EKG QRS DURATION: 132 MS
EKG QTC CALCULATION (BAZETT): 477 MS
EKG R AXIS: 70 DEGREES
EKG T AXIS: 30 DEGREES
EKG VENTRICULAR RATE: 70 BPM